# Patient Record
Sex: FEMALE | Race: BLACK OR AFRICAN AMERICAN | NOT HISPANIC OR LATINO | URBAN - METROPOLITAN AREA
[De-identification: names, ages, dates, MRNs, and addresses within clinical notes are randomized per-mention and may not be internally consistent; named-entity substitution may affect disease eponyms.]

---

## 2017-12-08 ENCOUNTER — INPATIENT HOSPITAL (OUTPATIENT)
Dept: URBAN - METROPOLITAN AREA HOSPITAL 107 | Facility: HOSPITAL | Age: 62
End: 2017-12-08

## 2017-12-08 DIAGNOSIS — R10.9 UNSPECIFIED ABDOMINAL PAIN: ICD-10-CM

## 2017-12-08 DIAGNOSIS — R19.7 DIARRHEA, UNSPECIFIED: ICD-10-CM

## 2017-12-08 DIAGNOSIS — K92.1 MELENA: ICD-10-CM

## 2017-12-08 DIAGNOSIS — R93.3 ABNORMAL FINDINGS ON DIAGNOSTIC IMAGING OF OTHER PARTS OF DI: ICD-10-CM

## 2017-12-08 PROCEDURE — 99223 1ST HOSP IP/OBS HIGH 75: CPT | Performed by: INTERNAL MEDICINE

## 2017-12-09 ENCOUNTER — INPATIENT HOSPITAL (OUTPATIENT)
Dept: URBAN - METROPOLITAN AREA HOSPITAL 107 | Facility: HOSPITAL | Age: 62
End: 2017-12-09

## 2017-12-09 DIAGNOSIS — K52.9 NONINFECTIVE GASTROENTERITIS AND COLITIS, UNSPECIFIED: ICD-10-CM

## 2017-12-09 DIAGNOSIS — K92.1 MELENA: ICD-10-CM

## 2017-12-09 DIAGNOSIS — R93.3 ABNORMAL FINDINGS ON DIAGNOSTIC IMAGING OF OTHER PARTS OF DI: ICD-10-CM

## 2017-12-09 DIAGNOSIS — R19.7 DIARRHEA, UNSPECIFIED: ICD-10-CM

## 2017-12-09 DIAGNOSIS — K63.3 ULCER OF INTESTINE: ICD-10-CM

## 2017-12-09 PROCEDURE — 45331 SIGMOIDOSCOPY AND BIOPSY: CPT | Performed by: INTERNAL MEDICINE

## 2017-12-10 PROCEDURE — 99232 SBSQ HOSP IP/OBS MODERATE 35: CPT | Performed by: INTERNAL MEDICINE

## 2017-12-11 ENCOUNTER — INPATIENT HOSPITAL (OUTPATIENT)
Dept: URBAN - METROPOLITAN AREA HOSPITAL 107 | Facility: HOSPITAL | Age: 62
End: 2017-12-11

## 2017-12-11 DIAGNOSIS — D64.89 OTHER SPECIFIED ANEMIAS: ICD-10-CM

## 2017-12-11 DIAGNOSIS — K92.1 MELENA: ICD-10-CM

## 2017-12-11 DIAGNOSIS — R10.9 UNSPECIFIED ABDOMINAL PAIN: ICD-10-CM

## 2017-12-11 DIAGNOSIS — K52.9 NONINFECTIVE GASTROENTERITIS AND COLITIS, UNSPECIFIED: ICD-10-CM

## 2017-12-11 PROCEDURE — 99231 SBSQ HOSP IP/OBS SF/LOW 25: CPT | Performed by: PHYSICIAN ASSISTANT

## 2017-12-22 ENCOUNTER — OFFICE (OUTPATIENT)
Dept: URBAN - METROPOLITAN AREA CLINIC 75 | Facility: CLINIC | Age: 62
End: 2017-12-22

## 2017-12-22 VITALS
WEIGHT: 130 LBS | SYSTOLIC BLOOD PRESSURE: 130 MMHG | DIASTOLIC BLOOD PRESSURE: 80 MMHG | HEART RATE: 68 BPM | HEIGHT: 64 IN

## 2017-12-22 DIAGNOSIS — K52.9 NONINFECTIVE GASTROENTERITIS AND COLITIS, UNSPECIFIED: ICD-10-CM

## 2017-12-22 PROCEDURE — 99214 OFFICE O/P EST MOD 30 MIN: CPT | Performed by: INTERNAL MEDICINE

## 2018-01-19 ENCOUNTER — HOSPITAL ENCOUNTER (INPATIENT)
Facility: HOSPITAL | Age: 63
LOS: 8 days | Discharge: ANOTHER HOSPITAL | End: 2018-01-27
Attending: EMERGENCY MEDICINE | Admitting: INTERNAL MEDICINE

## 2018-01-19 DIAGNOSIS — Z74.09 IMPAIRED FUNCTIONAL MOBILITY AND ACTIVITY TOLERANCE: ICD-10-CM

## 2018-01-19 DIAGNOSIS — R50.9 FUO (FEVER OF UNKNOWN ORIGIN): Primary | ICD-10-CM

## 2018-01-19 PROBLEM — E87.5 HYPERKALEMIA: Status: ACTIVE | Noted: 2018-01-19

## 2018-01-19 PROBLEM — N17.9 AKI (ACUTE KIDNEY INJURY): Status: ACTIVE | Noted: 2018-01-19

## 2018-01-19 PROBLEM — L03.113 CELLULITIS OF RIGHT UPPER ARM: Status: ACTIVE | Noted: 2018-01-05

## 2018-01-19 PROBLEM — E87.1 HYPONATREMIA: Status: ACTIVE | Noted: 2018-01-19

## 2018-01-19 LAB
ALBUMIN SERPL-MCNC: 2.6 G/DL (ref 3.5–5.2)
ALBUMIN/GLOB SERPL: 0.5 G/DL
ALP SERPL-CCNC: 543 U/L (ref 39–117)
ALT SERPL W P-5'-P-CCNC: 26 U/L (ref 1–33)
ANION GAP SERPL CALCULATED.3IONS-SCNC: 12.2 MMOL/L
ANISOCYTOSIS BLD QL: ABNORMAL
ANISOCYTOSIS BLD QL: NORMAL
AST SERPL-CCNC: 78 U/L (ref 1–32)
BILIRUB SERPL-MCNC: 0.5 MG/DL (ref 0.1–1.2)
BUN BLD-MCNC: 59 MG/DL (ref 8–23)
BUN/CREAT SERPL: 34.7 (ref 7–25)
CALCIUM SPEC-SCNC: 9 MG/DL (ref 8.6–10.5)
CHLORIDE SERPL-SCNC: 92 MMOL/L (ref 98–107)
CO2 SERPL-SCNC: 22.8 MMOL/L (ref 22–29)
CREAT BLD-MCNC: 1.7 MG/DL (ref 0.57–1)
D-LACTATE SERPL-SCNC: 2.1 MMOL/L (ref 0.5–2)
DEPRECATED RDW RBC AUTO: 49.6 FL (ref 37–54)
ERYTHROCYTE [DISTWIDTH] IN BLOOD BY AUTOMATED COUNT: 19.9 % (ref 11.7–13)
GFR SERPL CREATININE-BSD FRML MDRD: 37 ML/MIN/1.73
GLOBULIN UR ELPH-MCNC: 5.5 GM/DL
GLUCOSE BLD-MCNC: 92 MG/DL (ref 65–99)
HCT VFR BLD AUTO: 26.9 % (ref 35.6–45.5)
HGB BLD-MCNC: 8.1 G/DL (ref 11.9–15.5)
HOLD SPECIMEN: NORMAL
HYPOCHROMIA BLD QL: ABNORMAL
HYPOCHROMIA BLD QL: NORMAL
LYMPHOCYTES # BLD MANUAL: 0.97 10*3/MM3 (ref 0.9–4.8)
LYMPHOCYTES NFR BLD MANUAL: 12 % (ref 5–12)
LYMPHOCYTES NFR BLD MANUAL: 16 % (ref 19.6–45.3)
MCH RBC QN AUTO: 21 PG (ref 26.9–32)
MCHC RBC AUTO-ENTMCNC: 30.1 G/DL (ref 32.4–36.3)
MCV RBC AUTO: 69.9 FL (ref 80.5–98.2)
MICROCYTES BLD QL: ABNORMAL
MICROCYTES BLD QL: NORMAL
MONOCYTES # BLD AUTO: 0.73 10*3/MM3 (ref 0.2–1.2)
NEUTROPHILS # BLD AUTO: 4.37 10*3/MM3 (ref 1.9–8.1)
NEUTROPHILS NFR BLD MANUAL: 72 % (ref 42.7–76)
PLAT MORPH BLD: NORMAL
PLATELET # BLD AUTO: 150 10*3/MM3 (ref 140–500)
POTASSIUM BLD-SCNC: 6 MMOL/L (ref 3.5–5.2)
PROT SERPL-MCNC: 8.1 G/DL (ref 6–8.5)
RBC # BLD AUTO: 3.85 10*6/MM3 (ref 3.9–5.2)
SCAN SLIDE: NORMAL
SMUDGE CELLS BLD QL SMEAR: ABNORMAL
SMUDGE CELLS BLD QL SMEAR: NORMAL
SODIUM BLD-SCNC: 127 MMOL/L (ref 136–145)
TARGETS BLD QL SMEAR: ABNORMAL
TARGETS BLD QL SMEAR: NORMAL
WBC NRBC COR # BLD: 6.07 10*3/MM3 (ref 4.5–10.7)

## 2018-01-19 PROCEDURE — 99284 EMERGENCY DEPT VISIT MOD MDM: CPT

## 2018-01-19 PROCEDURE — 85007 BL SMEAR W/DIFF WBC COUNT: CPT | Performed by: EMERGENCY MEDICINE

## 2018-01-19 PROCEDURE — 25010000002 ENOXAPARIN PER 10 MG: Performed by: INTERNAL MEDICINE

## 2018-01-19 PROCEDURE — 25010000002 VANCOMYCIN 10 G RECONSTITUTED SOLUTION: Performed by: EMERGENCY MEDICINE

## 2018-01-19 PROCEDURE — 83605 ASSAY OF LACTIC ACID: CPT | Performed by: EMERGENCY MEDICINE

## 2018-01-19 PROCEDURE — 36415 COLL VENOUS BLD VENIPUNCTURE: CPT | Performed by: EMERGENCY MEDICINE

## 2018-01-19 PROCEDURE — 25010000002 MEROPENEM PER 100 MG: Performed by: EMERGENCY MEDICINE

## 2018-01-19 PROCEDURE — 87040 BLOOD CULTURE FOR BACTERIA: CPT | Performed by: EMERGENCY MEDICINE

## 2018-01-19 PROCEDURE — 80053 COMPREHEN METABOLIC PANEL: CPT | Performed by: EMERGENCY MEDICINE

## 2018-01-19 PROCEDURE — 85025 COMPLETE CBC W/AUTO DIFF WBC: CPT | Performed by: EMERGENCY MEDICINE

## 2018-01-19 RX ORDER — GABAPENTIN 100 MG/1
200 CAPSULE ORAL EVERY 12 HOURS SCHEDULED
Status: DISCONTINUED | OUTPATIENT
Start: 2018-01-19 | End: 2018-01-25

## 2018-01-19 RX ORDER — HYDROCODONE BITARTRATE AND ACETAMINOPHEN 5; 325 MG/1; MG/1
1 TABLET ORAL EVERY 6 HOURS PRN
Status: DISCONTINUED | OUTPATIENT
Start: 2018-01-19 | End: 2018-01-27 | Stop reason: HOSPADM

## 2018-01-19 RX ORDER — ACETAMINOPHEN 325 MG/1
650 TABLET ORAL EVERY 6 HOURS PRN
Status: DISCONTINUED | OUTPATIENT
Start: 2018-01-19 | End: 2018-01-27 | Stop reason: HOSPADM

## 2018-01-19 RX ORDER — ESTRADIOL 1 MG/1
1 TABLET ORAL DAILY
COMMUNITY
End: 2018-01-27 | Stop reason: HOSPADM

## 2018-01-19 RX ORDER — FLUCONAZOLE 200 MG/1
400 TABLET ORAL DAILY
COMMUNITY
End: 2018-01-27 | Stop reason: HOSPADM

## 2018-01-19 RX ORDER — IRON ASPGLY,PS/C/B12/FA/CA/SUC 150-25-1
1 CAPSULE ORAL
Status: DISCONTINUED | OUTPATIENT
Start: 2018-01-20 | End: 2018-01-26

## 2018-01-19 RX ORDER — GABAPENTIN 100 MG/1
200 CAPSULE ORAL 3 TIMES DAILY
COMMUNITY
End: 2018-01-27 | Stop reason: HOSPADM

## 2018-01-19 RX ORDER — CYCLOSPORINE 0.5 MG/ML
1 EMULSION OPHTHALMIC 2 TIMES DAILY
Status: DISCONTINUED | OUTPATIENT
Start: 2018-01-19 | End: 2018-01-27 | Stop reason: HOSPADM

## 2018-01-19 RX ORDER — DEXTROSE MONOHYDRATE 25 G/50ML
50 INJECTION, SOLUTION INTRAVENOUS
Status: DISCONTINUED | OUTPATIENT
Start: 2018-01-19 | End: 2018-01-27 | Stop reason: HOSPADM

## 2018-01-19 RX ORDER — ATENOLOL AND CHLORTHALIDONE TABLET 50; 25 MG/1; MG/1
1 TABLET ORAL DAILY
COMMUNITY
End: 2018-01-27 | Stop reason: HOSPADM

## 2018-01-19 RX ORDER — FERROUS GLUCONATE 324(37.5)
324 TABLET ORAL 2 TIMES DAILY WITH MEALS
COMMUNITY
End: 2018-01-27 | Stop reason: HOSPADM

## 2018-01-19 RX ORDER — TEMAZEPAM 15 MG/1
15 CAPSULE ORAL NIGHTLY PRN
Status: DISCONTINUED | OUTPATIENT
Start: 2018-01-19 | End: 2018-01-27 | Stop reason: HOSPADM

## 2018-01-19 RX ORDER — TEMAZEPAM 15 MG/1
15 CAPSULE ORAL NIGHTLY PRN
COMMUNITY
End: 2018-01-27 | Stop reason: HOSPADM

## 2018-01-19 RX ORDER — AMLODIPINE AND VALSARTAN 5; 320 MG/1; MG/1
1 TABLET ORAL DAILY
COMMUNITY
End: 2018-01-27 | Stop reason: HOSPADM

## 2018-01-19 RX ORDER — HYDROCODONE BITARTRATE AND ACETAMINOPHEN 5; 325 MG/1; MG/1
1.5 TABLET ORAL EVERY 6 HOURS PRN
COMMUNITY
End: 2018-01-27 | Stop reason: HOSPADM

## 2018-01-19 RX ORDER — SODIUM POLYSTYRENE SULFONATE 15 G/60ML
30 SUSPENSION ORAL; RECTAL ONCE
Status: COMPLETED | OUTPATIENT
Start: 2018-01-19 | End: 2018-01-20

## 2018-01-19 RX ORDER — CYCLOSPORINE 0.5 MG/ML
1 EMULSION OPHTHALMIC 2 TIMES DAILY
COMMUNITY

## 2018-01-19 RX ORDER — FERROUS SULFATE 325(65) MG
325 TABLET ORAL 2 TIMES DAILY WITH MEALS
Status: DISCONTINUED | OUTPATIENT
Start: 2018-01-19 | End: 2018-01-26

## 2018-01-19 RX ORDER — ESTRADIOL 1 MG/1
1 TABLET ORAL DAILY
Status: DISCONTINUED | OUTPATIENT
Start: 2018-01-20 | End: 2018-01-26

## 2018-01-19 RX ORDER — ERGOCALCIFEROL 1.25 MG/1
50000 CAPSULE ORAL WEEKLY
COMMUNITY
End: 2018-01-27 | Stop reason: HOSPADM

## 2018-01-19 RX ORDER — PREDNISONE 10 MG/1
5 TABLET ORAL DAILY
COMMUNITY
End: 2018-01-27 | Stop reason: HOSPADM

## 2018-01-19 RX ADMIN — ENOXAPARIN SODIUM 30 MG: 30 INJECTION SUBCUTANEOUS at 22:45

## 2018-01-19 RX ADMIN — SODIUM CHLORIDE 500 ML: 9 INJECTION, SOLUTION INTRAVENOUS at 22:01

## 2018-01-19 RX ADMIN — MEROPENEM 1 G: 1 INJECTION, POWDER, FOR SOLUTION INTRAVENOUS at 21:12

## 2018-01-19 RX ADMIN — VANCOMYCIN HYDROCHLORIDE 1250 MG: 100 INJECTION, POWDER, LYOPHILIZED, FOR SOLUTION INTRAVENOUS at 22:03

## 2018-01-19 RX ADMIN — SODIUM CHLORIDE 500 ML: 9 INJECTION, SOLUTION INTRAVENOUS at 19:56

## 2018-01-19 RX ADMIN — TEMAZEPAM 15 MG: 15 CAPSULE ORAL at 22:45

## 2018-01-19 RX ADMIN — HYDROCODONE BITARTRATE AND ACETAMINOPHEN 1 TABLET: 5; 325 TABLET ORAL at 22:45

## 2018-01-20 ENCOUNTER — APPOINTMENT (OUTPATIENT)
Dept: ULTRASOUND IMAGING | Facility: HOSPITAL | Age: 63
End: 2018-01-20

## 2018-01-20 ENCOUNTER — APPOINTMENT (OUTPATIENT)
Dept: GENERAL RADIOLOGY | Facility: HOSPITAL | Age: 63
End: 2018-01-20

## 2018-01-20 ENCOUNTER — APPOINTMENT (OUTPATIENT)
Dept: CT IMAGING | Facility: HOSPITAL | Age: 63
End: 2018-01-20

## 2018-01-20 PROBLEM — D64.9 ANEMIA: Status: ACTIVE | Noted: 2018-01-20

## 2018-01-20 LAB
ALBUMIN SERPL-MCNC: 2 G/DL (ref 3.5–5.2)
ANION GAP SERPL CALCULATED.3IONS-SCNC: 12.4 MMOL/L
ANISOCYTOSIS BLD QL: NORMAL
B PERT DNA SPEC QL NAA+PROBE: NOT DETECTED
BASOPHILS # BLD AUTO: 0.01 10*3/MM3 (ref 0–0.2)
BASOPHILS NFR BLD AUTO: 0.2 % (ref 0–1.5)
BILIRUB UR QL STRIP: NEGATIVE
BUN BLD-MCNC: 48 MG/DL (ref 8–23)
BUN/CREAT SERPL: 38.7 (ref 7–25)
C PNEUM DNA NPH QL NAA+NON-PROBE: NOT DETECTED
C3 FRG RBC-MCNC: NORMAL
CALCIUM SPEC-SCNC: 8 MG/DL (ref 8.6–10.5)
CHLORIDE SERPL-SCNC: 101 MMOL/L (ref 98–107)
CLARITY UR: CLEAR
CO2 SERPL-SCNC: 21.6 MMOL/L (ref 22–29)
COLOR UR: YELLOW
CORTIS SERPL-MCNC: 7.97 MCG/DL
CREAT BLD-MCNC: 1.24 MG/DL (ref 0.57–1)
CREAT UR-MCNC: 40.1 MG/DL
CRP SERPL-MCNC: 10.72 MG/DL (ref 0–0.5)
D-LACTATE SERPL-SCNC: 1.7 MMOL/L (ref 0.5–2)
DEPRECATED RDW RBC AUTO: 49.3 FL (ref 37–54)
EOSINOPHIL # BLD AUTO: 0.02 10*3/MM3 (ref 0–0.7)
EOSINOPHIL NFR BLD AUTO: 0.4 % (ref 0.3–6.2)
ERYTHROCYTE [DISTWIDTH] IN BLOOD BY AUTOMATED COUNT: 19.9 % (ref 11.7–13)
FLUAV H1 2009 PAND RNA NPH QL NAA+PROBE: NOT DETECTED
FLUAV H1 HA GENE NPH QL NAA+PROBE: NOT DETECTED
FLUAV H3 RNA NPH QL NAA+PROBE: NOT DETECTED
FLUAV SUBTYP SPEC NAA+PROBE: NOT DETECTED
FLUBV RNA ISLT QL NAA+PROBE: NOT DETECTED
GFR SERPL CREATININE-BSD FRML MDRD: 53 ML/MIN/1.73
GLUCOSE BLD-MCNC: 69 MG/DL (ref 65–99)
GLUCOSE UR STRIP-MCNC: NEGATIVE MG/DL
HADV DNA SPEC NAA+PROBE: NOT DETECTED
HCOV 229E RNA SPEC QL NAA+PROBE: NOT DETECTED
HCOV HKU1 RNA SPEC QL NAA+PROBE: NOT DETECTED
HCOV NL63 RNA SPEC QL NAA+PROBE: NOT DETECTED
HCOV OC43 RNA SPEC QL NAA+PROBE: NOT DETECTED
HCT VFR BLD AUTO: 22.9 % (ref 35.6–45.5)
HCT VFR BLD AUTO: 24 % (ref 35.6–45.5)
HGB BLD-MCNC: 7 G/DL (ref 11.9–15.5)
HGB BLD-MCNC: 7.1 G/DL (ref 11.9–15.5)
HGB UR QL STRIP.AUTO: ABNORMAL
HMPV RNA NPH QL NAA+NON-PROBE: NOT DETECTED
HPIV1 RNA SPEC QL NAA+PROBE: NOT DETECTED
HPIV2 RNA SPEC QL NAA+PROBE: NOT DETECTED
HPIV3 RNA NPH QL NAA+PROBE: NOT DETECTED
HPIV4 P GENE NPH QL NAA+PROBE: NOT DETECTED
HYPOCHROMIA BLD QL: NORMAL
IMM GRANULOCYTES # BLD: 0.04 10*3/MM3 (ref 0–0.03)
IMM GRANULOCYTES NFR BLD: 0.8 % (ref 0–0.5)
KETONES UR QL STRIP: NEGATIVE
LEUKOCYTE ESTERASE UR QL STRIP.AUTO: NEGATIVE
LYMPHOCYTES # BLD AUTO: 2.07 10*3/MM3 (ref 0.9–4.8)
LYMPHOCYTES NFR BLD AUTO: 42.4 % (ref 19.6–45.3)
M PNEUMO IGG SER IA-ACNC: NOT DETECTED
MACROCYTES BLD QL SMEAR: NORMAL
MCH RBC QN AUTO: 21.1 PG (ref 26.9–32)
MCHC RBC AUTO-ENTMCNC: 30.6 G/DL (ref 32.4–36.3)
MCV RBC AUTO: 69.2 FL (ref 80.5–98.2)
MONOCYTES # BLD AUTO: 0.28 10*3/MM3 (ref 0.2–1.2)
MONOCYTES NFR BLD AUTO: 5.7 % (ref 5–12)
NEUTROPHILS # BLD AUTO: 2.46 10*3/MM3 (ref 1.9–8.1)
NEUTROPHILS NFR BLD AUTO: 50.5 % (ref 42.7–76)
NITRITE UR QL STRIP: NEGATIVE
PH UR STRIP.AUTO: 6.5 [PH] (ref 5–8)
PHOSPHATE SERPL-MCNC: 4.3 MG/DL (ref 2.5–4.5)
PLAT MORPH BLD: NORMAL
PLATELET # BLD AUTO: 126 10*3/MM3 (ref 140–500)
POTASSIUM BLD-SCNC: 4.7 MMOL/L (ref 3.5–5.2)
PROCALCITONIN SERPL-MCNC: 1.01 NG/ML (ref 0.1–0.25)
PROT UR QL STRIP: ABNORMAL
RBC # BLD AUTO: 3.31 10*6/MM3 (ref 3.9–5.2)
RHINOVIRUS RNA SPEC NAA+PROBE: NOT DETECTED
RSV RNA NPH QL NAA+NON-PROBE: NOT DETECTED
SODIUM BLD-SCNC: 135 MMOL/L (ref 136–145)
SODIUM UR-SCNC: 75 MMOL/L
SP GR UR STRIP: 1.01 (ref 1–1.03)
TARGETS BLD QL SMEAR: NORMAL
UROBILINOGEN UR QL STRIP: ABNORMAL
WBC MORPH BLD: NORMAL
WBC NRBC COR # BLD: 4.88 10*3/MM3 (ref 4.5–10.7)

## 2018-01-20 PROCEDURE — 85014 HEMATOCRIT: CPT | Performed by: INTERNAL MEDICINE

## 2018-01-20 PROCEDURE — 85007 BL SMEAR W/DIFF WBC COUNT: CPT | Performed by: INTERNAL MEDICINE

## 2018-01-20 PROCEDURE — 63710000001 INSULIN REGULAR HUMAN PER 5 UNITS: Performed by: INTERNAL MEDICINE

## 2018-01-20 PROCEDURE — 84145 PROCALCITONIN (PCT): CPT | Performed by: INTERNAL MEDICINE

## 2018-01-20 PROCEDURE — 0 IOPAMIDOL 61 % SOLUTION: Performed by: HOSPITALIST

## 2018-01-20 PROCEDURE — 86140 C-REACTIVE PROTEIN: CPT | Performed by: INTERNAL MEDICINE

## 2018-01-20 PROCEDURE — 81003 URINALYSIS AUTO W/O SCOPE: CPT | Performed by: INTERNAL MEDICINE

## 2018-01-20 PROCEDURE — 87798 DETECT AGENT NOS DNA AMP: CPT | Performed by: INTERNAL MEDICINE

## 2018-01-20 PROCEDURE — 85025 COMPLETE CBC W/AUTO DIFF WBC: CPT | Performed by: INTERNAL MEDICINE

## 2018-01-20 PROCEDURE — 99223 1ST HOSP IP/OBS HIGH 75: CPT | Performed by: INTERNAL MEDICINE

## 2018-01-20 PROCEDURE — 80069 RENAL FUNCTION PANEL: CPT | Performed by: INTERNAL MEDICINE

## 2018-01-20 PROCEDURE — 25010000002 MEROPENEM PER 100 MG: Performed by: INTERNAL MEDICINE

## 2018-01-20 PROCEDURE — 25010000002 ENOXAPARIN PER 10 MG: Performed by: INTERNAL MEDICINE

## 2018-01-20 PROCEDURE — 82570 ASSAY OF URINE CREATININE: CPT | Performed by: INTERNAL MEDICINE

## 2018-01-20 PROCEDURE — 76775 US EXAM ABDO BACK WALL LIM: CPT

## 2018-01-20 PROCEDURE — 73600 X-RAY EXAM OF ANKLE: CPT

## 2018-01-20 PROCEDURE — 87633 RESP VIRUS 12-25 TARGETS: CPT | Performed by: INTERNAL MEDICINE

## 2018-01-20 PROCEDURE — 84300 ASSAY OF URINE SODIUM: CPT | Performed by: INTERNAL MEDICINE

## 2018-01-20 PROCEDURE — 87581 M.PNEUMON DNA AMP PROBE: CPT | Performed by: INTERNAL MEDICINE

## 2018-01-20 PROCEDURE — 71260 CT THORAX DX C+: CPT

## 2018-01-20 PROCEDURE — 85018 HEMOGLOBIN: CPT | Performed by: INTERNAL MEDICINE

## 2018-01-20 PROCEDURE — 74177 CT ABD & PELVIS W/CONTRAST: CPT

## 2018-01-20 PROCEDURE — 87486 CHLMYD PNEUM DNA AMP PROBE: CPT | Performed by: INTERNAL MEDICINE

## 2018-01-20 PROCEDURE — 82533 TOTAL CORTISOL: CPT | Performed by: INTERNAL MEDICINE

## 2018-01-20 PROCEDURE — 87385 HISTOPLASMA CAPSUL AG IA: CPT | Performed by: INTERNAL MEDICINE

## 2018-01-20 PROCEDURE — 83605 ASSAY OF LACTIC ACID: CPT | Performed by: EMERGENCY MEDICINE

## 2018-01-20 RX ADMIN — ENOXAPARIN SODIUM 30 MG: 30 INJECTION SUBCUTANEOUS at 21:30

## 2018-01-20 RX ADMIN — SODIUM BICARBONATE: 84 INJECTION, SOLUTION INTRAVENOUS at 00:59

## 2018-01-20 RX ADMIN — DEXTROSE MONOHYDRATE 50 ML: 25 INJECTION, SOLUTION INTRAVENOUS at 00:03

## 2018-01-20 RX ADMIN — SODIUM BICARBONATE: 84 INJECTION, SOLUTION INTRAVENOUS at 21:17

## 2018-01-20 RX ADMIN — HYDROCODONE BITARTRATE AND ACETAMINOPHEN 1 TABLET: 5; 325 TABLET ORAL at 21:31

## 2018-01-20 RX ADMIN — CYCLOSPORINE 1 DROP: 0.5 EMULSION OPHTHALMIC at 21:30

## 2018-01-20 RX ADMIN — SODIUM POLYSTYRENE SULFONATE 30 G: 15 SUSPENSION ORAL; RECTAL at 00:59

## 2018-01-20 RX ADMIN — Medication 1 CAPSULE: at 10:13

## 2018-01-20 RX ADMIN — IOPAMIDOL 85 ML: 612 INJECTION, SOLUTION INTRAVENOUS at 15:31

## 2018-01-20 RX ADMIN — HYDROCODONE BITARTRATE AND ACETAMINOPHEN 1 TABLET: 5; 325 TABLET ORAL at 16:25

## 2018-01-20 RX ADMIN — ACETAMINOPHEN 650 MG: 325 TABLET ORAL at 11:21

## 2018-01-20 RX ADMIN — ESTRADIOL 1 MG: 1 TABLET ORAL at 10:24

## 2018-01-20 RX ADMIN — TEMAZEPAM 15 MG: 15 CAPSULE ORAL at 21:31

## 2018-01-20 RX ADMIN — HUMAN INSULIN 10 UNITS: 100 INJECTION, SOLUTION SUBCUTANEOUS at 00:03

## 2018-01-20 RX ADMIN — FERROUS SULFATE TAB 325 MG (65 MG ELEMENTAL FE) 325 MG: 325 (65 FE) TAB at 10:01

## 2018-01-20 RX ADMIN — HYDROCODONE BITARTRATE AND ACETAMINOPHEN 1 TABLET: 5; 325 TABLET ORAL at 10:01

## 2018-01-20 RX ADMIN — CYCLOSPORINE 1 DROP: 0.5 EMULSION OPHTHALMIC at 10:01

## 2018-01-20 RX ADMIN — SODIUM BICARBONATE: 84 INJECTION, SOLUTION INTRAVENOUS at 10:00

## 2018-01-20 RX ADMIN — CYCLOSPORINE 1 DROP: 0.5 EMULSION OPHTHALMIC at 00:03

## 2018-01-20 NOTE — ED PROVIDER NOTES
EMERGENCY DEPARTMENT ENCOUNTER    CHIEF COMPLAINT  Chief Complaint: BLE edema  History given by: Pt  History limited by: Nothing  Room Number: /  PMD: Javier Soriano III, MD      HPI:  Pt is a 62 y.o. female who presents complaining of difficulty walking secondary to BLE edema. Pt also c/o fever (TMax = 101.2, this morning) and fatigue. Pt reports she has been seen at [facility] multiple times for her sx. During her admissions she was told she had an ischemic colitis and well as liver nodules. She that in late December she began experiencing BLE edema. She states she saw her rheumatologist for her BLE edema who was concerned for cellulitis, and was given a prednisone dosepak and told to go to the ER if she ran a fever.    Duration/Onset/Timing: several weeks  Location: BLE  Radiation: none  Quality: swelling  Intensity/Severity: moderate  Associated Symptoms: fever (TMax = 101.2, this morning), fatigue  Aggravating or Alleviating Factors: none  Previous Episodes: Pt reports she was seen by her rheumatologist who was concerned for cellulitis      PAST MEDICAL HISTORY  Active Ambulatory Problems     Diagnosis Date Noted   • No Active Ambulatory Problems     Resolved Ambulatory Problems     Diagnosis Date Noted   • No Resolved Ambulatory Problems     Past Medical History:   Diagnosis Date   • Hypertension    • Ischemic colitis    • Rheumatoid arthritis    • Thyroid nodule        PAST SURGICAL HISTORY  Past Surgical History:   Procedure Laterality Date   • ANKLE SURGERY Right    •  SECTION     • HYSTERECTOMY     • JOINT REPLACEMENT Bilateral     knee       FAMILY HISTORY  History reviewed. No pertinent family history.    SOCIAL HISTORY  Social History     Social History   • Marital status:      Spouse name: N/A   • Number of children: N/A   • Years of education: N/A     Occupational History   • Not on file.     Social History Main Topics   • Smoking status: Never Smoker   • Smokeless tobacco: Not on  file   • Alcohol use No   • Drug use: Defer   • Sexual activity: Defer     Other Topics Concern   • Not on file     Social History Narrative   • No narrative on file       ALLERGIES  Keflex [cephalexin] and Latex    REVIEW OF SYSTEMS  Review of Systems   Constitutional: Positive for fatigue and fever (101.2, this morning).   HENT: Negative for sore throat.    Eyes: Negative.    Respiratory: Negative for cough and shortness of breath.    Cardiovascular: Positive for leg swelling. Negative for chest pain.   Gastrointestinal: Negative for abdominal pain, diarrhea and vomiting.   Genitourinary: Negative for dysuria.   Musculoskeletal: Negative for neck pain.   Skin: Negative for rash.   Allergic/Immunologic: Negative.    Neurological: Negative for weakness, numbness and headaches.   Hematological: Negative.    Psychiatric/Behavioral: Negative.    All other systems reviewed and are negative.      PHYSICAL EXAM  ED Triage Vitals   Temp Heart Rate Resp BP SpO2   01/19/18 1351 01/19/18 1351 01/19/18 1351 01/19/18 1415 01/19/18 1351   100.4 °F (38 °C) 95 18 166/116 96 %      Temp src Heart Rate Source Patient Position BP Location FiO2 (%)   01/19/18 1552 01/19/18 1552 01/19/18 1552 01/19/18 1552 --   Oral Monitor Sitting Left arm        Physical Exam   Constitutional: She is oriented to person, place, and time and well-developed, well-nourished, and in no distress. No distress.   HENT:   Head: Normocephalic and atraumatic.   Eyes: EOM are normal. Pupils are equal, round, and reactive to light.   Neck: Normal range of motion. Neck supple.   Cardiovascular: Regular rhythm and normal heart sounds.  Tachycardia present.    Pulmonary/Chest: Effort normal and breath sounds normal. No respiratory distress.   Abdominal: Soft. There is no tenderness. There is no rebound and no guarding.   Musculoskeletal: Normal range of motion. She exhibits edema (bilateral pedal edema) and tenderness (BLE).   Neurological: She is alert and oriented  to person, place, and time. She has normal sensation and normal strength.   Skin: Skin is warm and dry. No rash noted.   There is an area of erythema, warmth, and tenderness on the L lower leg with blistering. There is a smaller area on the R lower leg that is erythematous, warm, and tender without blister.    Psychiatric: Mood and affect normal.   Nursing note and vitals reviewed.      LAB RESULTS  Lab Results (last 24 hours)     Procedure Component Value Units Date/Time    Comprehensive Metabolic Panel [[id number]]  (Abnormal) Collected:  01/19/18 1457    Specimen:  Blood Updated:  01/19/18 1715     Glucose 92 mg/dL      BUN 59 (H) mg/dL      Creatinine 1.70 (H) mg/dL      Sodium 127 (L) mmol/L      Potassium 6.0 (H) mmol/L      Chloride 92 (L) mmol/L      CO2 22.8 mmol/L      Calcium 9.0 mg/dL      Total Protein 8.1 g/dL      Albumin 2.60 (L) g/dL      ALT (SGPT) 26 U/L      AST (SGOT) 78 (H) U/L      Alkaline Phosphatase 543 (H) U/L      Total Bilirubin 0.5 mg/dL      eGFR  African Amer 37 (L) mL/min/1.73      Globulin 5.5 gm/dL      A/G Ratio 0.5 g/dL      BUN/Creatinine Ratio 34.7 (H)     Anion Gap 12.2 mmol/L     CBC & Differential [[id number]] Collected:  01/19/18 1457    Specimen:  Blood Updated:  01/19/18 6379    Narrative:       The following orders were created for panel order CBC & Differential.  Procedure                               Abnormality         Status                     ---------                               -----------         ------                     Manual Differential[[id number]]          Abnormal            Final result               Scan Slide[[id number]]                                       Final result               CBC Auto Differential[[id number]]        Abnormal            Final result                 Please view results for these tests on the individual orders.    CBC Auto Differential [[id number]]  (Abnormal) Collected:  01/19/18 1457    Specimen:  Blood Updated:  01/19/18 7048      WBC 6.07 10*3/mm3      RBC 3.85 (L) 10*6/mm3      Hemoglobin 8.1 (L) g/dL      Hematocrit 26.9 (L) %      MCV 69.9 (L) fL      MCH 21.0 (L) pg      MCHC 30.1 (L) g/dL      RDW 19.9 (H) %      RDW-SD 49.6 fl      Platelets 150 10*3/mm3     Scan Slide [[id number]] Collected:  01/19/18 1457    Specimen:  Blood Updated:  01/19/18 1556     Anisocytosis Mod/2+     Hypochromia Slight/1+     Microcytes Slight/1+     Target Cells Slight/1+     Smudge Cells Slight/1+     Scan Slide --      See Manual Differential Results       Manual Differential [[id number]]  (Abnormal) Collected:  01/19/18 1457    Specimen:  Blood Updated:  01/19/18 1556     Neutrophil % 72.0 %      Lymphocyte % 16.0 (L) %      Monocyte % 12.0 %      Neutrophils Absolute 4.37 10*3/mm3      Lymphocytes Absolute 0.97 10*3/mm3      Monocytes Absolute 0.73 10*3/mm3      Anisocytosis Mod/2+     Hypochromia Slight/1+     Microcytes Slight/1+     Target Cells Slight/1+     Smudge Cells Slight/1+     Platelet Morphology Normal    Lactic Acid, Plasma [[id number]]  (Abnormal) Collected:  01/19/18 1934    Specimen:  Blood Updated:  01/19/18 2028     Lactate 2.1 (C) mmol/L     Blood Culture - Blood, Blood, Venous Line [[id number]] Collected:  01/19/18 1934    Specimen:  Blood from Blood, Venous Line Updated:  01/19/18 1949    Lactic Acid, Reflex Timer (This will reflex a repeat order 3-3:15 hours after ordered.) [[id number]] Collected:  01/19/18 1934    Specimen:  Blood Updated:  01/19/18 2028          I ordered the above labs and reviewed the results    PROCEDURES  Procedures      PROGRESS AND CONSULTS  ED Course     1908 - Lab work ordered for further evaluation. IVF ordered.     1947 - Consult placed with A.     2035 - Consulted with Dr. Elam ([facility]) who agrees to admit the pt.     2036 - Vancomycin and meropenem ordered.       MEDICAL DECISION MAKING  Results were reviewed/discussed with the patient and they were also made aware of online access. Pt also made  aware that some labs, such as cultures, will not be resulted during ER visit and follow up with PMD is necessary.     MDM  Number of Diagnoses or Management Options     Amount and/or Complexity of Data Reviewed  Clinical lab tests: ordered and reviewed (Hemoglobin - 8.1  Creatinine - 1.70  BUN - 59  Lactate - 2.1)  Discuss the patient with other providers: yes (Dr. Elam ([facility]))         DIAGNOSIS  Final diagnoses:   FUO (fever of unknown origin)       DISPOSITION  ADMISSION    Discussed treatment plan and reason for admission with pt/family and admitting physician.  Pt/family voiced understanding of the plan for admission for further testing/treatment as needed.         Latest Documented Vital Signs:  As of 9:00 PM  BP- 135/95 HR- 91 Temp- 98 °F (36.7 °C) (Oral) O2 sat- 100%    --  Documentation assistance provided by sharad Owens for Dr. Panda.  Information recorded by the scribe was done at my direction and has been verified and validated by me.     Sanya Owens  01/19/18 2043       Sanya Owens  01/19/18 2100       Diego Panda MD  01/19/18 3668

## 2018-01-20 NOTE — PLAN OF CARE
"Problem: Patient Care Overview (Adult)  Goal: Plan of Care Review  Outcome: Ongoing (interventions implemented as appropriate)   01/20/18 1647   Coping/Psychosocial Response Interventions   Plan Of Care Reviewed With patient   Patient Care Overview   Progress no change   Outcome Evaluation   Outcome Summary/Follow up Plan Temp 99.9 today at ~16:30, other VSS. Still have a \"10\" pain level in stephanie LE. Bicarb gtt still @ 100 per order. Renal US (-), clean catch urine (-), RVP (-). Procalcitonin 1.01, CRXR Protein 10.71, called to Dr. Andrews who is watching the labs he has ordered today. H&H from noon not yet collected. Called lab around 12:30 to come up and draw, never showed. Pt off floor for at least 2 hours after 2pm. Pt back around 16:15; called lab, who said they will get a phlebotomist up here to draw it. CTM.       Problem: Infection, Risk/Actual (Adult)  Goal: Identify Related Risk Factors and Signs and Symptoms  Outcome: Ongoing (interventions implemented as appropriate)   01/20/18 1647   Infection, Risk/Actual   Infection, Risk/Actual: Related Risk Factors age extremes;chronic illness/condition;medication effects   Signs and Symptoms (Infection, Risk/Actual) feeding/eating intolerance;heart rate increase;body temperature changes     Goal: Infection Prevention/Resolution  Outcome: Ongoing (interventions implemented as appropriate)   01/20/18 1647   Infection, Risk/Actual (Adult)   Infection Prevention/Resolution making progress toward outcome       Problem: Fall Risk (Adult)  Goal: Absence of Falls  Outcome: Ongoing (interventions implemented as appropriate)   01/20/18 1647   Fall Risk (Adult)   Absence of Falls making progress toward outcome       Problem: Pain, Acute (Adult)  Goal: Acceptable Pain Control/Comfort Level  Outcome: Ongoing (interventions implemented as appropriate)   01/20/18 1647   Pain, Acute (Adult)   Acceptable Pain Control/Comfort Level making progress toward outcome       Problem: " Pressure Ulcer Risk (Checo Scale) (Adult,Obstetrics,Pediatric)  Goal: Identify Related Risk Factors and Signs and Symptoms  Outcome: Ongoing (interventions implemented as appropriate)   01/20/18 1647   Pressure Ulcer Risk (Checo Scale)   Related Risk Factors (Pressure Ulcer Risk (Checo Scale)) age extremes     Goal: Skin Integrity  Outcome: Ongoing (interventions implemented as appropriate)   01/20/18 1647   Pressure Ulcer Risk (Checo Scale) (Adult,Obstetrics,Pediatric)   Skin Integrity making progress toward outcome

## 2018-01-20 NOTE — NURSING NOTE
Call to Dr. Gonzales re: Hgb 7.0 this am down from 8.1 yesterday. Recheck H&H @ noon today and inform the dayshift physician of results. CTM.

## 2018-01-20 NOTE — H&P
Name: Domitila Valera ADMIT: 2018   : 1955  PCP: Javier Soriano III, MD    MRN: [id number] LOS: 0 days   AGE/SEX: 62 y.o. female  ROOM:      Chief Complaint   Patient presents with   • Fever     last tyl 1145   • Foot Pain     r/t arthritis       Subjective  History of Present Illness  Ms. Valera is a 62 y.o. female has a history of Rheumatoid arthritis will normally sees Dr. Bro Lewis  was on immunosuppressive therapy with Xeljanz, recently stopped after infection.  She has been to [facility] multiple times and around [place] she tells me that she was diagnosed with ischemic colitis and some liver nodules.  The MRI showed these nodules could be hemangiomas.  She did had bloody stools had developed anemia.  Now those symptoms have resolved.  Today she presented with a history of having fever fatigue and pain in the right ankle.  She denies any nausea vomiting cough sputum production.  She did had fever up to 101.2 this morning.  She was not happy at [facility] and since she presented to [facility] ER.  In view of her labs shows acute renal insufficiency along with hyponatremia and hyperkalemia      History of Present Illness    Past Medical History:   Diagnosis Date   • Hypertension    • Ischemic colitis    • Rheumatoid arthritis    • Thyroid nodule      Past Surgical History:   Procedure Laterality Date   • ANKLE SURGERY Right    •  SECTION     • HYSTERECTOMY     • JOINT REPLACEMENT Bilateral     knee     History reviewed. No pertinent family history.  Social History   Substance Use Topics   • Smoking status: Never Smoker   • Smokeless tobacco: None   • Alcohol use No       (Not in a hospital admission)  Allergies:    Allergies   Allergen Reactions   • Keflex [Cephalexin]    • Latex        Review of Systems   Constitutional: Positive for fatigue and fever. Negative for chills and diaphoresis.   HENT: Negative for congestion, drooling, ear pain, mouth sores and voice change.     Eyes: Negative for discharge and itching.   Respiratory: Negative for apnea, cough, shortness of breath and wheezing.    Cardiovascular: Negative for chest pain and leg swelling.   Gastrointestinal: Negative for abdominal distention, abdominal pain, constipation, nausea and vomiting.   Endocrine: Negative for cold intolerance and heat intolerance.   Genitourinary: Negative for difficulty urinating, dysuria and frequency.   Musculoskeletal: Positive for joint swelling (right ankle). Negative for back pain, neck pain and neck stiffness.   Skin: Negative for rash.   Neurological: Negative for dizziness, seizures, syncope, weakness, numbness and headaches.   Hematological: Negative for adenopathy. Does not bruise/bleed easily.   Psychiatric/Behavioral: Negative for agitation, confusion and suicidal ideas.        Objective    Vital Signs  Temp:  [98 °F (36.7 °C)-100.4 °F (38 °C)] 98 °F (36.7 °C)  Heart Rate:  [] 97  Resp:  [16-18] 18  BP: (135-166)/() 139/96  SpO2:  [93 %-100 %] 99 %  on   ;   O2 Device: room air  Body mass index is 25.65 kg/(m^2).    Physical Exam   Constitutional: She appears well-developed.   HENT:   Head: Normocephalic and atraumatic.   Eyes: EOM are normal. Pupils are equal, round, and reactive to light.   Neck: Neck supple. No JVD present.   Cardiovascular: Normal rate and regular rhythm.    No murmur heard.  Pulmonary/Chest: Effort normal and breath sounds normal. No respiratory distress. She has no wheezes.   Abdominal: Soft. She exhibits no distension and no mass.   Musculoskeletal: She exhibits no edema.   There is an area of erythema, warmth, and tenderness on the L lower leg with blistering. There is a smaller area on the R lower leg that is erythematous, warm, and tender without blister.   Skin: Skin is warm and dry. There is erythema.   Psychiatric: She has a normal mood and affect. Her behavior is normal. Judgment and thought content normal.       Results Review:   I reviewed  the patient's new clinical results.    Lab Results (last 24 hours)     Procedure Component Value Units Date/Time    Comprehensive Metabolic Panel [[id number]]  (Abnormal) Collected:  01/19/18 1457    Specimen:  Blood Updated:  01/19/18 1545     Glucose 92 mg/dL      BUN 59 (H) mg/dL      Creatinine 1.70 (H) mg/dL      Sodium 127 (L) mmol/L      Potassium 6.0 (H) mmol/L      Chloride 92 (L) mmol/L      CO2 22.8 mmol/L      Calcium 9.0 mg/dL      Total Protein 8.1 g/dL      Albumin 2.60 (L) g/dL      ALT (SGPT) 26 U/L      AST (SGOT) 78 (H) U/L      Alkaline Phosphatase 543 (H) U/L      Total Bilirubin 0.5 mg/dL      eGFR  African Amer 37 (L) mL/min/1.73      Globulin 5.5 gm/dL      A/G Ratio 0.5 g/dL      BUN/Creatinine Ratio 34.7 (H)     Anion Gap 12.2 mmol/L     CBC & Differential [[id number]] Collected:  01/19/18 1457    Specimen:  Blood Updated:  01/19/18 1556    Narrative:       The following orders were created for panel order CBC & Differential.  Procedure                               Abnormality         Status                     ---------                               -----------         ------                     Manual Differential[[id number]]          Abnormal            Final result               Scan Slide[[id number]]                                       Final result               CBC Auto Differential[[id number]]        Abnormal            Final result                 Please view results for these tests on the individual orders.    CBC Auto Differential [[id number]]  (Abnormal) Collected:  01/19/18 1457    Specimen:  Blood Updated:  01/19/18 1556     WBC 6.07 10*3/mm3      RBC 3.85 (L) 10*6/mm3      Hemoglobin 8.1 (L) g/dL      Hematocrit 26.9 (L) %      MCV 69.9 (L) fL      MCH 21.0 (L) pg      MCHC 30.1 (L) g/dL      RDW 19.9 (H) %      RDW-SD 49.6 fl      Platelets 150 10*3/mm3     Scan Slide [[id number]] Collected:  01/19/18 1457    Specimen:  Blood Updated:  01/19/18 1556     Anisocytosis Mod/2+      Hypochromia Slight/1+     Microcytes Slight/1+     Target Cells Slight/1+     Smudge Cells Slight/1+     Scan Slide --      See Manual Differential Results       Manual Differential [[id number]]  (Abnormal) Collected:  01/19/18 1457    Specimen:  Blood Updated:  01/19/18 1556     Neutrophil % 72.0 %      Lymphocyte % 16.0 (L) %      Monocyte % 12.0 %      Neutrophils Absolute 4.37 10*3/mm3      Lymphocytes Absolute 0.97 10*3/mm3      Monocytes Absolute 0.73 10*3/mm3      Anisocytosis Mod/2+     Hypochromia Slight/1+     Microcytes Slight/1+     Target Cells Slight/1+     Smudge Cells Slight/1+     Platelet Morphology Normal    Lactic Acid, Plasma [[id number]]  (Abnormal) Collected:  01/19/18 1934    Specimen:  Blood Updated:  01/19/18 2028     Lactate 2.1 (C) mmol/L     Blood Culture - Blood, Blood, Venous Line [[id number]] Collected:  01/19/18 1934    Specimen:  Blood from Blood, Venous Line Updated:  01/19/18 1949    Lactic Acid, Reflex Timer (This will reflex a repeat order 3-3:15 hours after ordered.) [[id number]] Collected:  01/19/18 1934    Specimen:  Blood Updated:  01/19/18 2028          No orders to display     Assessment/Plan   Active Hospital Problems (** Indicates Principal Problem)    Diagnosis Date Noted   • FUO (fever of unknown origin) [R50.9] 01/19/2018   • Hyperkalemia [E87.5] 01/19/2018   • Hyponatremia [E87.1] 01/19/2018   • JEFFERY (acute kidney injury) [N17.9] 01/19/2018      Resolved Hospital Problems    Diagnosis Date Noted Date Resolved   No resolved problems to display.         Ms. Valera is a 62 y.o. female who Has cellulitis of the lower extremities along with multiple electrolyte abnormalities name of the hyperkalemia and hyponatremia.  She also has acute renal insufficiency her baseline creatinine was 0.7 last year.  She also has anemia of chronic disease along with rheumatoid arthritis and she is immunocompromised secondary to her rheumatoid arthritis treatment.  I have talked to the patient  about all these conditions and she'll be admitted.  She has already received IV fluids and we will continue IV fluids and also get a renal consultation along with infectious diseases consultation.  Continue broad-spectrum antibiotics namely meropenem and vancomycin which were started to the emergency room and blood cultures have been drawn      I discussed the patients findings and my recommendations with patient and family.      Hugo Elam MD  [facility]  01/19/18  9:48 PM

## 2018-01-20 NOTE — CONSULTS
Name: Domitila Valera  Age: 62 y.o.  : 1955  Sex: female        Chief complaint: LE swelling and fever       HPI    Pt 62 dread old with hist RA, HTN admitted with LE swelling and fever for the last one week. Pt recently discharged from [facility] treated for ? Colitis.    Pt on admission BUN 59 creat 1.7 potassium 6.0 She was hypotensive received bolus IVF . Diagnosed LE cellulitis started on ABX         Objective:    Vital Signs  Temp:  [98 °F (36.7 °C)-100.4 °F (38 °C)] 98.4 °F (36.9 °C)  Heart Rate:  [] 106  Resp:  [16-18] 18  BP: (125-166)/() 125/103        Intake/Output Summary (Last 24 hours) at 18 2255  Last data filed at 18 2113   Gross per 24 hour   Intake              500 ml   Output                0 ml   Net              500 ml           Physical Exam    Physical Exam   Lungs clear   LE edema redness   CVS S1 S2 reg   Abd soft non tender         Results Review:        Results from last 7 days  Lab Units 18  1457   SODIUM mmol/L 127*   CO2 mmol/L 22.8   BUN mg/dL 59*   CREATININE mg/dL 1.70*   CALCIUM mg/dL 9.0   ALBUMIN g/dL 2.60*   AST (SGOT) U/L 78*   ALT (SGPT) U/L 26         Results from last 7 days  Lab Units 18  1457   WBC 10*3/mm3 6.07       Pertinent Imaging studies were reviewed      Medication Review:       Current Facility-Administered Medications:   •  acetaminophen (TYLENOL) tablet 650 mg, 650 mg, Oral, Q6H PRN, Hugo Elam MD  •  cycloSPORINE (RESTASIS) 0.05 % ophthalmic emulsion 1 drop, 1 drop, Both Eyes, BID, Hugo Elam MD  •  enoxaparin (LOVENOX) syringe 30 mg, 30 mg, Subcutaneous, Q24H, Hugo Elam MD, 30 mg at 18 0704  •  [START ON 2018] estradiol (ESTRACE) tablet 1 mg, 1 mg, Oral, Daily, Hugo Elam MD  •  [START ON 2018] Ferrex 150 forte plus capsule 1 capsule, 1 capsule, Oral, Daily With Breakfast, Hugo Elam MD  •  ferrous sulfate tablet 325 mg, 325 mg, Oral, BID With Meals,  Hugo Elam MD  •  gabapentin (NEURONTIN) capsule 200 mg, 200 mg, Oral, Q12H, Hugo Elam MD  •  HYDROcodone-acetaminophen (NORCO) 5-325 MG per tablet 1 tablet, 1 tablet, Oral, Q6H PRN, Hugo Elam MD, 1 tablet at 01/19/18 6243  •  [START ON 1/20/2018] meropenem (MERREM) in SWFI 1 GRAM/20ml IV PUSH syringe, 1 g, Intravenous, Q12H, Hugo Elam MD  •  Pharmacy to dose vancomycin, , Does not apply, Continuous PRN, Hugo Elam MD  •  sodium polystyrene (KAYEXALATE) 15 GM/60ML suspension 30 g, 30 g, Oral, Once, Diego Panda MD  •  temazepam (RESTORIL) capsule 15 mg, 15 mg, Oral, Nightly PRN, Hugo Elam MD, 15 mg at 01/19/18 1212    Pharmacy to dose vancomycin        Assesment  JEFFERY secondary to decreased intravsc volume and sespsis secondary to cellulitis     HYperkalemia     Hyponatremia     LE cellulitis     RA    HTN       Plan:  UA  Urine lytes   Renal ultrasound  Treat Hyperkalemia Insulin D50   IVF   Low K diet       Vinayak Feliciano MD  01/19/18  10:55 PM  Tel: [phone]  Fax: [phone]

## 2018-01-20 NOTE — PROGRESS NOTES
Clinical Pharmacy Services: Medication History    Domitila Valera is a 62 y.o. female presenting to [facility] for   Chief Complaint   Patient presents with   • Fever     last tyl 1145   • Foot Pain     r/t arthritis       She  has a past medical history of Hypertension; Ischemic colitis; Rheumatoid arthritis; and Thyroid nodule.    Allergies as of 01/19/2018 - Arun as Reviewed 01/19/2018   Allergen Reaction Noted   • Keflex [cephalexin]  01/19/2018   • Latex  01/19/2018       Medication information was obtained from: Family, patient, medication list  Pharmacy and Phone Number: [facility] [phone]    Prior to Admission Medications     Prescriptions Last Dose Informant Patient Reported? Taking?    amLODIPine-valsartan (EXFORGE) 5-320 MG per tablet 1/18/2018 Family Member Yes Yes    Take 1 tablet by mouth Daily.    atenolol-chlorthalidone (TENORETIC) 50-25 MG per tablet 1/18/2018 Family Member Yes Yes    Take 1 tablet by mouth Daily.    cycloSPORINE (RESTASIS) 0.05 % ophthalmic emulsion  Family Member Yes Yes    Administer 1 drop to both eyes 2 (Two) Times a Day.    estradiol (ESTRACE) 1 MG tablet 1/18/2018 Family Member Yes Yes    Take 1 mg by mouth Daily.    ferrous gluconate 324 (37.5 Fe) MG tablet tablet 1/18/2018 Family Member Yes Yes    Take 324 mg by mouth 2 (Two) Times a Day With Meals.    fluconazole (DIFLUCAN) 200 MG tablet 1/18/2018 Family Member Yes Yes    Take 400 mg by mouth Daily.    Folic Acid-Vit B6-Vit B12 (B COMPLEX-FOLIC ACID PO) 1/18/2018 Family Member Yes Yes    Take 1 capsule by mouth Daily.    gabapentin (NEURONTIN) 100 MG capsule 1/18/2018 Family Member Yes Yes    Take 200 mg by mouth 3 (Three) Times a Day.    HYDROcodone-acetaminophen (NORCO) 5-325 MG per tablet 1/19/2018 Family Member Yes Yes    Take 1.5 tablets by mouth Every 6 (Six) Hours As Needed for Moderate Pain .    predniSONE (DELTASONE) 10 MG tablet  Family Member Yes No    Take 5 mg by mouth Daily.    temazepam  (RESTORIL) 15 MG capsule 1/18/2018 Family Member Yes Yes    Take 15 mg by mouth At Night As Needed for Sleep.    vitamin D (ERGOCALCIFEROL) 49657 units capsule capsule  Family Member Yes Yes    Take 50,000 Units by mouth 1 (One) Time Per Week.    Tofacitinib Citrate (XELJANZ) 5 MG tablet  Family Member Yes No    Take 1 tablet by mouth 2 (Two) Times a Day.            Medication notes: Patient was instructed by physician today to D/C Prednisone and Xeljanz due to fever    This medication list is complete to the best of my knowledge as of 1/19/2018    Please call if questions.    Lidia Bucio, Medication History Technician  1/19/2018 8:05 PM

## 2018-01-20 NOTE — PLAN OF CARE
Problem: Patient Care Overview (Adult)  Goal: Plan of Care Review  Outcome: Ongoing (interventions implemented as appropriate)   01/20/18 0112   Coping/Psychosocial Response Interventions   Plan Of Care Reviewed With patient   Patient Care Overview   Progress improving   Outcome Evaluation   Outcome Summary/Follow up Plan VSS, A&OX4, c/o leg pain tx with prn po pain med as ordered, afebrile, bicarb gtt started, reg insulin/D50 and kayexolate given as ordered, reflex lactic trending down, renal u/s in am per nephrology, ID to see in am, will monitor       Problem: Infection, Risk/Actual (Adult)  Goal: Identify Related Risk Factors and Signs and Symptoms  Outcome: Ongoing (interventions implemented as appropriate)      Problem: Fall Risk (Adult)  Goal: Identify Related Risk Factors and Signs and Symptoms  Outcome: Ongoing (interventions implemented as appropriate)    Goal: Absence of Falls  Outcome: Ongoing (interventions implemented as appropriate)      Problem: Pain, Acute (Adult)  Goal: Identify Related Risk Factors and Signs and Symptoms  Outcome: Ongoing (interventions implemented as appropriate)    Goal: Acceptable Pain Control/Comfort Level  Outcome: Ongoing (interventions implemented as appropriate)

## 2018-01-20 NOTE — NURSING NOTE
Call out to Dr. Morales. Wants Dr. Andrews to be called re: CRX Protein and Procalcitonin levels. CTM.

## 2018-01-20 NOTE — PROGRESS NOTES
"DAILY PROGRESS NOTE  [facility]    Patient Identification:  Name: Domitila Valera  Age: 62 y.o.  Sex: female  :  1955  MRN: [id number]         Primary Care Physician: Javier Soriano III, MD    Subjective:  Interval History:She complains of pain in feet.    Objective:    Scheduled Meds:    cycloSPORINE 1 drop Both Eyes BID   enoxaparin 30 mg Subcutaneous Q24H   estradiol 1 mg Oral Daily   Ferrex 150 forte plus 1 capsule Oral Daily With Breakfast   ferrous sulfate 325 mg Oral BID With Meals   gabapentin 200 mg Oral Q12H     Continuous Infusions:    custom IV infusion builder  Last Rate: 100 mL/hr at 18 1125       Vital signs in last 24 hours:  Temp:  [98 °F (36.7 °C)-100.6 °F (38.1 °C)] 100.6 °F (38.1 °C)  Heart Rate:  [] 113  Resp:  [16-20] 20  BP: (111-166)/() 111/90    Intake/Output:    Intake/Output Summary (Last 24 hours) at 18 1254  Last data filed at 18 1125   Gross per 24 hour   Intake             3170 ml   Output             1000 ml   Net             2170 ml       Exam:  /90 (BP Location: Right arm, Patient Position: Lying)  Pulse 113  Temp (!) 100.6 °F (38.1 °C) (Oral)   Resp 20  Ht 149.9 cm (59\")  Wt 63.1 kg (139 lb 1.6 oz)  SpO2 95%  BMI 28.09 kg/m2    General Appearance:    Alert, cooperative, no distress   Head:    Normocephalic, without obvious abnormality, atraumatic   Eyes:       Throat:   Lips, tongue, gums normal   Neck:   Supple, symmetrical, trachea midline, no JVD   Lungs:     Clear to auscultation bilaterally, respirations unlabored   Chest Wall:    No tenderness or deformity    Heart:    Regular rate and rhythm, S1 and S2 normal, no murmur,no  Rub or gallop   Abdomen:     Soft, non-tender, bowel sounds active, no masses, no organomegaly    Extremities:   Extremities normal, atraumatic, no cyanosis or edema, cellulitis in lower extremity.   Pulses:      Skin:   Skin is warm and dry,  no rashes or palpable lesions   Neurologic:   " no focal deficits noted      [unfilled]  Data Review:    Results from last 7 days  Lab Units 01/20/18  0445 01/19/18  1457   SODIUM mmol/L 135* 127*   POTASSIUM mmol/L 4.7 6.0*   CHLORIDE mmol/L 101 92*   CO2 mmol/L 21.6* 22.8   BUN mg/dL 48* 59*   CREATININE mg/dL 1.24* 1.70*   GLUCOSE mg/dL 69 92   CALCIUM mg/dL 8.0* 9.0       Results from last 7 days  Lab Units 01/20/18  0602 01/19/18  1457   WBC 10*3/mm3 4.88 6.07   HEMOGLOBIN g/dL 7.0* 8.1*   HEMATOCRIT % 22.9* 26.9*   PLATELETS 10*3/mm3 126* 150             No results found for: TROPONINT        Results from last 7 days  Lab Units 01/19/18  1457   ALK PHOS U/L 543*   BILIRUBIN mg/dL 0.5   ALT (SGPT) U/L 26   AST (SGOT) U/L 78*             No results found for: POCGLU        Patient Active Problem List   Diagnosis Code   • FUO (fever of unknown origin) R50.9   • Hyperkalemia E87.5   • Benign essential HTN I10   • Cellulitis of right upper arm L03.113   • Hyponatremia E87.1   • JEFFERY (acute kidney injury) N17.9   • Anemia D64.9       Assessment:  Active Hospital Problems (** Indicates Principal Problem)    Diagnosis Date Noted   • **FUO (fever of unknown origin) [R50.9] 01/19/2018   • Anemia [D64.9] 01/20/2018   • Hyperkalemia [E87.5] 01/19/2018   • Hyponatremia [E87.1] 01/19/2018   • JEFFERY (acute kidney injury) [N17.9] 01/19/2018      Resolved Hospital Problems    Diagnosis Date Noted Date Resolved   No resolved problems to display.       Plan:  ID consult noted.  Work up in progress await plans from ID.  May need transfusion    Elie Morales MD  1/20/2018  12:54 PM

## 2018-01-20 NOTE — CONSULTS
"Referring Provider: Hugo Elam MD  [id number]  [address] [address]    Reason for Consultation: fever    History of present illness:  Ms Valera is a 61 YO who I am asked to evaluate and give opinion for fever. History is obtained from the patient and review of the old medical records which I summarize/synthesize as follows: She has a history of RA and was on Xeljanz and prednisone \"for years.\" She says she was well until around Dec 2017 when she started having daily cramping sharp abdominal pain with loose stools. She was seen at [facility]. CT suggestive of either ischemic colitis or infectious colitis. She tells me she was treated with antibiotics and had some improvement. However she's had some fevers since this time and says that her rheumatologist took her off of Xeljanz and prednisone. She's also had some LE edema and B ankle pain. She came into the ER last night for fever. She denies cough, vomiting, or diarrhea. She currently denies abdominal pain. Her main complaint is thel eg swelling. In the ER she had a normal WBC but low grade fever. She was started on vancomycin and meropenem for cellulitis. She remains febrile this AM and tachycardic. Her BP is normal.    I reviewed her [place] records. She has never had positive blood cultures or C diff. TTE did not show any vegetations.    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    Family History:  No 1st degree relatives w/ FUO    Allergies:  Keflex causes rash many years ago    Medications:    Current Facility-Administered Medications:   •  acetaminophen (TYLENOL) tablet 650 mg, 650 mg, Oral, Q6H PRN, Hugo Elam MD  •  cycloSPORINE " (RESTASIS) 0.05 % ophthalmic emulsion 1 drop, 1 drop, Both Eyes, BID, Hugo Elam MD, 1 drop at 01/20/18 0003  •  dextrose (D50W) solution 50 mL, 50 mL, Intravenous, Q1H PRN, Vianyak Feliciano MD, 50 mL at 01/20/18 0003  •  enoxaparin (LOVENOX) syringe 30 mg, 30 mg, Subcutaneous, Q24H, Hugo Elam MD, 30 mg at 01/19/18 2245  •  estradiol (ESTRACE) tablet 1 mg, 1 mg, Oral, Daily, Hugo Elam MD  •  Ferrex 150 forte plus capsule 1 capsule, 1 capsule, Oral, Daily With Breakfast, Hugo Elam MD  •  ferrous sulfate tablet 325 mg, 325 mg, Oral, BID With Meals, Hugo Elam MD  •  gabapentin (NEURONTIN) capsule 200 mg, 200 mg, Oral, Q12H, Hugo Elam MD  •  HYDROcodone-acetaminophen (NORCO) 5-325 MG per tablet 1 tablet, 1 tablet, Oral, Q6H PRN, Hugo Elam MD, 1 tablet at 01/19/18 2245  •  sodium chloride 0.45 % 950 mL with sodium bicarbonate 8.4 % 50 mEq infusion, , Intravenous, Continuous, Vinayak Feliciano MD, Last Rate: 100 mL/hr at 01/20/18 0059  •  temazepam (RESTORIL) capsule 15 mg, 15 mg, Oral, Nightly PRN, Hugo Elam MD, 15 mg at 01/19/18 2245      Review of Systems  All systems were reviewed and are negative unless otherwise stated above in the HPI    Objective   Vital Signs   Temp:  [98 °F (36.7 °C)-100.6 °F (38.1 °C)] 100.6 °F (38.1 °C)  Heart Rate:  [] 123  Resp:  [16-20] 20  BP: (111-166)/() 111/90    Physical Exam:   General: awake, alert, chronically ill appearing   Head: Normocephalic, atraumatic  Eyes: PERRL, EOMI, no scleral icterus, no conjunctival pallor, no conjunctival hemorrhages.   ENT: MM dry, cracked lips, OP clear, no thrush. Fair dentition.   Neck: Supple, no visible thyromegaly  Cardiovascular: tachycardic, RR, no murmurs, rubs, or gallops; 1+ pitting LE edema  Respiratory: Lungs are clear to ascultation bilaterally, no rales or wheezing; mild tachypnea on ambient air  GI: Abdomen is soft, non-tender, non-distended,  normal bowel sounds in all four quadrants; no hepatosplenomegaly, no masses palpated  : no Pineda catheter present  Musculoskeletal: B ankle tenderness  Skin: thin skin on shins, no significant erythema  Neurological: Alert and oriented x 3, cranial nerves 2-12 grossly intact, motor strength 5/5 in all four extremities  Psychiatric: Normal mood and affect   Lymph: no pre-auricular, post-auricular, submandibular, cervical, supraclavicular  LAD  Vasc: no cyanosis; PIV w/o erythema    Labs:     Lab Results   Component Value Date    WBC 4.88 01/20/2018    HGB 7.0 (L) 01/20/2018    HCT 22.9 (L) 01/20/2018    MCV 69.2 (L) 01/20/2018     (L) 01/20/2018       Lab Results   Component Value Date    GLUCOSE 69 01/20/2018    BUN 48 (H) 01/20/2018    CREATININE 1.24 (H) 01/20/2018    EGFRIFAFRI 53 (L) 01/20/2018    BCR 38.7 (H) 01/20/2018    CO2 21.6 (L) 01/20/2018    CALCIUM 8.0 (L) 01/20/2018    ALBUMIN 2.00 (L) 01/20/2018    LABIL2 0.5 01/19/2018    AST 78 (H) 01/19/2018    ALT 26 01/19/2018       Microbiology:  1/19 BCx: NGTD  1/20 RVP: pending     Radiology (personally reviewed images/report):  Renal US negative for mass    Assessment/Plan   1. Fever of unknown origin  2. B ankle pain and swelling  3. Rheumatoid arthritis  4. History of ischemic colitis    She needs a workup. I have ordered CT C/A/P, B ankle films, urine Histo antigen given steroids and Xeljanz treatments, cortisol given rapid cessation of steroids, procalcitonin, and CRP. Follow-up blood cultures. I would actually favor stopping her antibiotics until we have any objective evidence that she has a bacterial infection. The potential side effects of vancomycin and meropenem (C diff, JEFFERY, breeding resistance) are greater than any known benefit at this time.    Thank you for this consult. ID will follow.

## 2018-01-20 NOTE — PROGRESS NOTES
"   LOS: 1 day   Patient Care Team:  Javier Soriano III, MD as PCP - General (Internal Medicine)    Chief Complaint/ Reason for encounter: Acute renal failure, hyponatremia        Subjective     History of Present Illness    Subjective:  Symptoms:  Improved.  She reports malaise and weakness.  No shortness of breath or chest pain.  (Somewhat lethargic but arousable  Discussed with her son at bedside who states she is somewhat more alert today).    Diet:  Poor intake.  No nausea.    Activity level: Impaired due to weakness.    Pain:  She reports no pain.          History taken from: Patient and chart    Objective     Vital Signs  Temp:  [98 °F (36.7 °C)-100.6 °F (38.1 °C)] 100.6 °F (38.1 °C)  Heart Rate:  [] 113  Resp:  [16-20] 20  BP: (111-166)/() 111/90       Wt Readings from Last 1 Encounters:   01/20/18 0546 63.1 kg (139 lb 1.6 oz)   01/19/18 2243 61.6 kg (135 lb 14.4 oz)   01/19/18 1351 57.6 kg (127 lb)       Objective:  General Appearance:  Comfortable, ill-appearing, in no acute distress and not in pain (Thin, mildly cachectic).    Vital signs: (most recent): Blood pressure 111/90, pulse 113, temperature (!) 100.6 °F (38.1 °C), temperature source Oral, resp. rate 20, height 149.9 cm (59\"), weight 63.1 kg (139 lb 1.6 oz), SpO2 95 %.  Vital signs are normal.  Fever.    Output: Producing urine.    HEENT: Normal HEENT exam.    Lungs:  Normal respiratory rate and normal effort.  She is not in respiratory distress.  Breath sounds clear to auscultation.  There are decreased breath sounds.  No wheezes or rales.    Heart: Normal rate.  Regular rhythm.    Abdomen: Abdomen is soft and non-distended.  Bowel sounds are normal.   There is no abdominal tenderness.  There is no epigastric area or no suprapubic area tenderness.  There is no rebound tenderness.   There is no mass.   Extremities: Normal range of motion.  There is dependent edema.  There is no deformity.    Pulses: Distal pulses are intact.  "   Neurological: Patient is alert.    Skin:  Warm and dry.  There is a rash.  No ecchymosis or ulceration.             Results Review:    Past Medical History: reviewed and updated  Past Medical History:   Diagnosis Date   • Hypertension    • Ischemic colitis    • Rheumatoid arthritis    • Thyroid nodule          Allergies:  Allergies   Allergen Reactions   • Buffered Aspirin Rash and Shortness Of Breath   • Ace Inhibitors      cough   • Aurothioglucose Diarrhea     Increased AST & ALT   • Latex Itching   • Keflex [Cephalexin] Hives and Rash       Intake/Output:     Intake/Output Summary (Last 24 hours) at 01/20/18 1325  Last data filed at 01/20/18 1125   Gross per 24 hour   Intake             3170 ml   Output             1000 ml   Net             2170 ml         DATA:  Interval chart, labs and notes reviewed.  The following labs personally reviewed by me.  Renal ultrasound personally reviewed, no obstruction  I personally reviewed her old records from [facility], baseline creatinine 1.4  Discussed with her son at bedside, interval history obtained from him as well regarding recent health and complaints    Labs:   Recent Results (from the past 24 hour(s))   Comprehensive Metabolic Panel    Collection Time: 01/19/18  2:57 PM   Result Value Ref Range    Glucose 92 65 - 99 mg/dL    BUN 59 (H) 8 - 23 mg/dL    Creatinine 1.70 (H) 0.57 - 1.00 mg/dL    Sodium 127 (L) 136 - 145 mmol/L    Potassium 6.0 (H) 3.5 - 5.2 mmol/L    Chloride 92 (L) 98 - 107 mmol/L    CO2 22.8 22.0 - 29.0 mmol/L    Calcium 9.0 8.6 - 10.5 mg/dL    Total Protein 8.1 6.0 - 8.5 g/dL    Albumin 2.60 (L) 3.50 - 5.20 g/dL    ALT (SGPT) 26 1 - 33 U/L    AST (SGOT) 78 (H) 1 - 32 U/L    Alkaline Phosphatase 543 (H) 39 - 117 U/L    Total Bilirubin 0.5 0.1 - 1.2 mg/dL    eGFR  African Amer 37 (L) >60 mL/min/1.73    Globulin 5.5 gm/dL    A/G Ratio 0.5 g/dL    BUN/Creatinine Ratio 34.7 (H) 7.0 - 25.0    Anion Gap 12.2 mmol/L   CBC Auto Differential    Collection  Time: 01/19/18  2:57 PM   Result Value Ref Range    WBC 6.07 4.50 - 10.70 10*3/mm3    RBC 3.85 (L) 3.90 - 5.20 10*6/mm3    Hemoglobin 8.1 (L) 11.9 - 15.5 g/dL    Hematocrit 26.9 (L) 35.6 - 45.5 %    MCV 69.9 (L) 80.5 - 98.2 fL    MCH 21.0 (L) 26.9 - 32.0 pg    MCHC 30.1 (L) 32.4 - 36.3 g/dL    RDW 19.9 (H) 11.7 - 13.0 %    RDW-SD 49.6 37.0 - 54.0 fl    Platelets 150 140 - 500 10*3/mm3   Scan Slide    Collection Time: 01/19/18  2:57 PM   Result Value Ref Range    Anisocytosis Mod/2+ None Seen    Hypochromia Slight/1+ None Seen    Microcytes Slight/1+ None Seen    Target Cells Slight/1+ None Seen    Smudge Cells Slight/1+ None Seen    Scan Slide     Manual Differential    Collection Time: 01/19/18  2:57 PM   Result Value Ref Range    Neutrophil % 72.0 42.7 - 76.0 %    Lymphocyte % 16.0 (L) 19.6 - 45.3 %    Monocyte % 12.0 5.0 - 12.0 %    Neutrophils Absolute 4.37 1.90 - 8.10 10*3/mm3    Lymphocytes Absolute 0.97 0.90 - 4.80 10*3/mm3    Monocytes Absolute 0.73 0.20 - 1.20 10*3/mm3    Anisocytosis Mod/2+ None Seen    Hypochromia Slight/1+ None Seen    Microcytes Slight/1+ None Seen    Target Cells Slight/1+ None Seen    Smudge Cells Slight/1+ None Seen    Platelet Morphology Normal Normal   Lactic Acid, Plasma    Collection Time: 01/19/18  7:34 PM   Result Value Ref Range    Lactate 2.1 (C) 0.5 - 2.0 mmol/L   Blood Culture - Blood, Blood, Venous Line    Collection Time: 01/19/18  7:34 PM   Result Value Ref Range    Blood Culture No growth at less than 24 hours    Lactic Acid, Reflex Timer (This will reflex a repeat order 3-3:15 hours after ordered.)    Collection Time: 01/19/18  7:34 PM   Result Value Ref Range    Extra Tube Hold for add-ons.    Blood Culture - Blood, Blood, Venous Line    Collection Time: 01/19/18 10:02 PM   Result Value Ref Range    Blood Culture No growth at less than 24 hours    Lactic Acid, Reflex    Collection Time: 01/20/18 12:29 AM   Result Value Ref Range    Lactate 1.7 0.5 - 2.0 mmol/L    Creatinine, Urine, Random - Urine, Clean Catch    Collection Time: 01/20/18  2:19 AM   Result Value Ref Range    Creatinine, Urine 40.1 mg/dL   Urinalysis - Urine, Clean Catch    Collection Time: 01/20/18  2:19 AM   Result Value Ref Range    Color, UA Yellow Yellow, Straw    Appearance, UA Clear Clear    pH, UA 6.5 5.0 - 8.0    Specific Gravity, UA 1.011 1.005 - 1.030    Glucose, UA Negative Negative    Ketones, UA Negative Negative    Bilirubin, UA Negative Negative    Blood, UA Trace (A) Negative    Protein,  mg/dL (2+) (A) Negative    Leuk Esterase, UA Negative Negative    Nitrite, UA Negative Negative    Urobilinogen, UA 1.0 E.U./dL 0.2 - 1.0 E.U./dL   Sodium, Urine, Random -    Collection Time: 01/20/18  2:20 AM   Result Value Ref Range    Sodium, Urine 75 mmol/L   Renal Function Panel    Collection Time: 01/20/18  4:45 AM   Result Value Ref Range    Glucose 69 65 - 99 mg/dL    BUN 48 (H) 8 - 23 mg/dL    Creatinine 1.24 (H) 0.57 - 1.00 mg/dL    Sodium 135 (L) 136 - 145 mmol/L    Potassium 4.7 3.5 - 5.2 mmol/L    Chloride 101 98 - 107 mmol/L    CO2 21.6 (L) 22.0 - 29.0 mmol/L    Calcium 8.0 (L) 8.6 - 10.5 mg/dL    Albumin 2.00 (L) 3.50 - 5.20 g/dL    Phosphorus 4.3 2.5 - 4.5 mg/dL    Anion Gap 12.4 mmol/L    BUN/Creatinine Ratio 38.7 (H) 7.0 - 25.0    eGFR  African Amer 53 (L) >60 mL/min/1.73   Procalcitonin    Collection Time: 01/20/18  4:45 AM   Result Value Ref Range    Procalcitonin 1.01 (C) 0.10 - 0.25 ng/mL   C-reactive Protein    Collection Time: 01/20/18  4:45 AM   Result Value Ref Range    C-Reactive Protein 10.72 (H) 0.00 - 0.50 mg/dL   Cortisol    Collection Time: 01/20/18  4:45 AM   Result Value Ref Range    Cortisol 7.97   mcg/dL   CBC Auto Differential    Collection Time: 01/20/18  6:02 AM   Result Value Ref Range    WBC 4.88 4.50 - 10.70 10*3/mm3    RBC 3.31 (L) 3.90 - 5.20 10*6/mm3    Hemoglobin 7.0 (L) 11.9 - 15.5 g/dL    Hematocrit 22.9 (L) 35.6 - 45.5 %    MCV 69.2 (L) 80.5 - 98.2 fL     MCH 21.1 (L) 26.9 - 32.0 pg    MCHC 30.6 (L) 32.4 - 36.3 g/dL    RDW 19.9 (H) 11.7 - 13.0 %    RDW-SD 49.3 37.0 - 54.0 fl    Platelets 126 (L) 140 - 500 10*3/mm3    Neutrophil % 50.5 42.7 - 76.0 %    Lymphocyte % 42.4 19.6 - 45.3 %    Monocyte % 5.7 5.0 - 12.0 %    Eosinophil % 0.4 0.3 - 6.2 %    Basophil % 0.2 0.0 - 1.5 %    Immature Grans % 0.8 (H) 0.0 - 0.5 %    Neutrophils, Absolute 2.46 1.90 - 8.10 10*3/mm3    Lymphocytes, Absolute 2.07 0.90 - 4.80 10*3/mm3    Monocytes, Absolute 0.28 0.20 - 1.20 10*3/mm3    Eosinophils, Absolute 0.02 0.00 - 0.70 10*3/mm3    Basophils, Absolute 0.01 0.00 - 0.20 10*3/mm3    Immature Grans, Absolute 0.04 (H) 0.00 - 0.03 10*3/mm3   Scan Slide    Collection Time: 01/20/18  6:02 AM   Result Value Ref Range    Anisocytosis Mod/2+ None Seen    Hypochromia Mod/2+ None Seen    Macrocytes Mod/2+ None Seen    RBC Fragments Slight/1+ None Seen    Target Cells Slight/1+ None Seen    WBC Morphology Normal Normal    Platelet Morphology Normal Normal   Respiratory Panel, PCR - Swab, Nasopharynx    Collection Time: 01/20/18 11:27 AM   Result Value Ref Range    ADENOVIRUS, PCR Not Detected Not Detected    Coronavirus 229E Not Detected Not Detected    Coronavirus HKU1 Not Detected Not Detected    Coronavirus NL63 Not Detected Not Detected    Coronavirus OC43 Not Detected Not Detected    Human Metapneumovirus Not Detected Not Detected    Human Rhinovirus/Enterovirus Not Detected Not Detected    Influenza B PCR Not Detected Not Detected    Parainfluenza Virus 1 Not Detected Not Detected    Parainfluenza Virus 2 Not Detected Not Detected    Parainfluenza Virus 3 Not Detected Not Detected    Parainfluenza Virus 4 Not Detected Not Detected    Bordetella pertussis pcr Not Detected Not Detected    Influenza A H1N1 2009 PCR Not Detected Not Detected    Chlamydophila pneumoniae PCR Not Detected Not Detected    Mycoplasma pneumo by PCR Not Detected Not Detected    Influenza A PCR Not Detected Not  Detected    Influenza A H3 Not Detected Not Detected    Influenza A H1 Not Detected Not Detected    RSV, PCR Not Detected Not Detected       Radiology:  Imaging Results (last 24 hours)     Procedure Component Value Units Date/Time    US Renal Bilateral [[id number]] Collected:  01/20/18 0823     Updated:  01/20/18 0827    Narrative:       US RENAL BILATERAL-     INDICATIONS: Acute kidney injury     TECHNIQUE: ULTRASOUND OF THE KIDNEYS AND URINARY BLADDER.     COMPARISON: None available     FINDINGS:     The right kidney measures 10.0 centimeters, the left kidney measures 9.6  centimeters.     No renal lesion is identified. A few echogenic foci are apparent in the  right kidney, as large as 3 mm, suggesting small nonobstructive stones.  No hydronephrosis or echogenic left nephrolithiasis.     No ureteral jets were observed during the exam. The urinary bladder  otherwise appears unremarkable.       Impression:       No hydronephrosis.     This report was finalized on 1/20/2018 8:24 AM by Dr. Mt Torres MD.                Medications have been reviewed:  Current Facility-Administered Medications   Medication Dose Route Frequency Provider Last Rate Last Dose   • acetaminophen (TYLENOL) tablet 650 mg  650 mg Oral Q6H PRN Hugo Elam MD   650 mg at 01/20/18 1121   • cycloSPORINE (RESTASIS) 0.05 % ophthalmic emulsion 1 drop  1 drop Both Eyes BID Hugo Elam MD   1 drop at 01/20/18 1001   • dextrose (D50W) solution 50 mL  50 mL Intravenous Q1H PRN Vinayak Feliciano MD   50 mL at 01/20/18 0003   • enoxaparin (LOVENOX) syringe 30 mg  30 mg Subcutaneous Q24H Hugo Elam MD   30 mg at 01/19/18 2245   • estradiol (ESTRACE) tablet 1 mg  1 mg Oral Daily Hugo Elam MD   1 mg at 01/20/18 1024   • Ferrex 150 forte plus capsule 1 capsule  1 capsule Oral Daily With Breakfast Hugo Elam MD   1 capsule at 01/20/18 1013   • ferrous sulfate tablet 325 mg  325 mg Oral BID With Meals Hugo  CONSTANZA Elam MD   325 mg at 01/20/18 1001   • gabapentin (NEURONTIN) capsule 200 mg  200 mg Oral Q12H Hugo Elam MD       • HYDROcodone-acetaminophen (NORCO) 5-325 MG per tablet 1 tablet  1 tablet Oral Q6H PRN Hugo Elam MD   1 tablet at 01/20/18 1001   • sodium chloride 0.45 % 950 mL with sodium bicarbonate 8.4 % 50 mEq infusion   Intravenous Continuous Vinayak Feliciano  mL/hr at 01/20/18 1125     • temazepam (RESTORIL) capsule 15 mg  15 mg Oral Nightly PRN Hugo Elam MD   15 mg at 01/19/18 2245       Assessment/Plan     Principal Problem:    FUO (fever of unknown origin)  Active Problems:    Hyperkalemia    Hyponatremia    JEFFERY (acute kidney injury)    Anemia      Assessment:  (Assesment  JEFFERY secondary to decreased intravsc volume and sespsis secondary to cellulitis , improved today    Chronic kidney disease, recent baseline creatinine around 1.4     HYperkalemia , improved     Hyponatremia , hypovolemic, better today with fluids     LE cellulitis      RA     HTN         Plan:  Renal function, hyponatremia and hyperkalemia all improved today  Continue IV fluids  Old records reviewed, recent baseline creatinine has been around 1.4  Renal ultrasound shows no obstruction, urine studies otherwise unremarkable  Continue current IV   Monitor vancomycin levels closely to avoid potential nephrotoxicity).             Continue to monitor renal function, electroytes and volume closely   Please call me with any questions or concerns      Kip Roberson MD   [facility]   [phone]    01/20/18  1:25 PM      Dictation performed using Dragon dictation software

## 2018-01-21 ENCOUNTER — APPOINTMENT (OUTPATIENT)
Dept: GENERAL RADIOLOGY | Facility: HOSPITAL | Age: 63
End: 2018-01-21

## 2018-01-21 PROBLEM — K92.2 GI BLEED: Status: ACTIVE | Noted: 2018-01-21

## 2018-01-21 LAB
ABO GROUP BLD: NORMAL
ALBUMIN SERPL-MCNC: 1.9 G/DL (ref 3.5–5.2)
ALBUMIN/GLOB SERPL: 0.4 G/DL
ALP SERPL-CCNC: 392 U/L (ref 39–117)
ALT SERPL W P-5'-P-CCNC: 20 U/L (ref 1–33)
ANION GAP SERPL CALCULATED.3IONS-SCNC: 12.6 MMOL/L
APPEARANCE CSF: ABNORMAL
AST SERPL-CCNC: 57 U/L (ref 1–32)
BILIRUB SERPL-MCNC: 0.4 MG/DL (ref 0.1–1.2)
BLD GP AB SCN SERPL QL: NEGATIVE
BUN BLD-MCNC: 34 MG/DL (ref 8–23)
BUN/CREAT SERPL: 29.8 (ref 7–25)
CALCIUM SPEC-SCNC: 7.7 MG/DL (ref 8.6–10.5)
CHLORIDE SERPL-SCNC: 97 MMOL/L (ref 98–107)
CO2 SERPL-SCNC: 25.4 MMOL/L (ref 22–29)
COLOR CSF: ABNORMAL
CREAT BLD-MCNC: 1.14 MG/DL (ref 0.57–1)
DEPRECATED RDW RBC AUTO: 47.8 FL (ref 37–54)
ERYTHROCYTE [DISTWIDTH] IN BLOOD BY AUTOMATED COUNT: 19.6 % (ref 11.7–13)
GFR SERPL CREATININE-BSD FRML MDRD: 58 ML/MIN/1.73
GLOBULIN UR ELPH-MCNC: 4.8 GM/DL
GLUCOSE BLD-MCNC: 80 MG/DL (ref 65–99)
GLUCOSE CSF-MCNC: 45 MG/DL (ref 40–70)
HCT VFR BLD AUTO: 21.9 % (ref 35.6–45.5)
HCT VFR BLD AUTO: 25.4 % (ref 35.6–45.5)
HCV AB SER DONR QL: NORMAL
HGB BLD-MCNC: 6.6 G/DL (ref 11.9–15.5)
HGB BLD-MCNC: 8 G/DL (ref 11.9–15.5)
HIV1 P24 AG SER QL: NORMAL
HIV1+2 AB SER QL: NORMAL
LDH SERPL-CCNC: 368 U/L (ref 135–214)
LYMPHOCYTES NFR CSF MANUAL: 86 %
MAGNESIUM SERPL-MCNC: 1.9 MG/DL (ref 1.6–2.4)
MCH RBC QN AUTO: 20.8 PG (ref 26.9–32)
MCHC RBC AUTO-ENTMCNC: 30.1 G/DL (ref 32.4–36.3)
MCV RBC AUTO: 69.1 FL (ref 80.5–98.2)
MONOCYTES NFR CSF MANUAL: 5 %
MONOS+MACROS NFR CSF: 7 %
NEUTROPHILS NFR CSF MICRO: 2 %
NUC CELL # CSF MANUAL: 11 /MM3 (ref 0–5)
OTHER CELLS CSF: 1 /100 WBCS
PLATELET # BLD AUTO: 121 10*3/MM3 (ref 140–500)
POTASSIUM BLD-SCNC: 4.1 MMOL/L (ref 3.5–5.2)
PROT CSF-MCNC: 48 MG/DL (ref 15–45)
PROT SERPL-MCNC: 6.7 G/DL (ref 6–8.5)
RBC # BLD AUTO: 3.17 10*6/MM3 (ref 3.9–5.2)
RBC # CSF MANUAL: 1640 /MM3 (ref 0–0)
RH BLD: POSITIVE
SODIUM BLD-SCNC: 135 MMOL/L (ref 136–145)
TUBE # CSF: 1
WBC NRBC COR # BLD: 5.23 10*3/MM3 (ref 4.5–10.7)

## 2018-01-21 PROCEDURE — 25010000002 VANCOMYCIN 10 G RECONSTITUTED SOLUTION: Performed by: HOSPITALIST

## 2018-01-21 PROCEDURE — 85018 HEMOGLOBIN: CPT | Performed by: HOSPITALIST

## 2018-01-21 PROCEDURE — P9016 RBC LEUKOCYTES REDUCED: HCPCS

## 2018-01-21 PROCEDURE — 85014 HEMATOCRIT: CPT | Performed by: HOSPITALIST

## 2018-01-21 PROCEDURE — 80053 COMPREHEN METABOLIC PANEL: CPT | Performed by: INTERNAL MEDICINE

## 2018-01-21 PROCEDURE — 87070 CULTURE OTHR SPECIMN AEROBIC: CPT | Performed by: INTERNAL MEDICINE

## 2018-01-21 PROCEDURE — 25010000002 CEFEPIME PER 500 MG: Performed by: INTERNAL MEDICINE

## 2018-01-21 PROCEDURE — 86803 HEPATITIS C AB TEST: CPT | Performed by: INTERNAL MEDICINE

## 2018-01-21 PROCEDURE — 93010 ELECTROCARDIOGRAM REPORT: CPT | Performed by: INTERNAL MEDICINE

## 2018-01-21 PROCEDURE — G0432 EIA HIV-1/HIV-2 SCREEN: HCPCS | Performed by: INTERNAL MEDICINE

## 2018-01-21 PROCEDURE — 85027 COMPLETE CBC AUTOMATED: CPT | Performed by: INTERNAL MEDICINE

## 2018-01-21 PROCEDURE — 99233 SBSQ HOSP IP/OBS HIGH 50: CPT | Performed by: INTERNAL MEDICINE

## 2018-01-21 PROCEDURE — 83735 ASSAY OF MAGNESIUM: CPT | Performed by: HOSPITALIST

## 2018-01-21 PROCEDURE — 77003 FLUOROGUIDE FOR SPINE INJECT: CPT

## 2018-01-21 PROCEDURE — 86901 BLOOD TYPING SEROLOGIC RH(D): CPT

## 2018-01-21 PROCEDURE — 93005 ELECTROCARDIOGRAM TRACING: CPT | Performed by: HOSPITALIST

## 2018-01-21 PROCEDURE — 86850 RBC ANTIBODY SCREEN: CPT | Performed by: HOSPITALIST

## 2018-01-21 PROCEDURE — 86592 SYPHILIS TEST NON-TREP QUAL: CPT | Performed by: INTERNAL MEDICINE

## 2018-01-21 PROCEDURE — 82945 GLUCOSE OTHER FLUID: CPT | Performed by: INTERNAL MEDICINE

## 2018-01-21 PROCEDURE — 87205 SMEAR GRAM STAIN: CPT | Performed by: INTERNAL MEDICINE

## 2018-01-21 PROCEDURE — 89051 BODY FLUID CELL COUNT: CPT | Performed by: INTERNAL MEDICINE

## 2018-01-21 PROCEDURE — 83615 LACTATE (LD) (LDH) ENZYME: CPT | Performed by: INTERNAL MEDICINE

## 2018-01-21 PROCEDURE — 86923 COMPATIBILITY TEST ELECTRIC: CPT

## 2018-01-21 PROCEDURE — 86900 BLOOD TYPING SEROLOGIC ABO: CPT

## 2018-01-21 PROCEDURE — 30233N1 TRANSFUSION OF NONAUTOLOGOUS RED BLOOD CELLS INTO PERIPHERAL VEIN, PERCUTANEOUS APPROACH: ICD-10-PCS | Performed by: HOSPITALIST

## 2018-01-21 PROCEDURE — 99223 1ST HOSP IP/OBS HIGH 75: CPT | Performed by: INTERNAL MEDICINE

## 2018-01-21 PROCEDURE — 87015 SPECIMEN INFECT AGNT CONCNTJ: CPT | Performed by: INTERNAL MEDICINE

## 2018-01-21 PROCEDURE — 36430 TRANSFUSION BLD/BLD COMPNT: CPT

## 2018-01-21 PROCEDURE — 87529 HSV DNA AMP PROBE: CPT | Performed by: INTERNAL MEDICINE

## 2018-01-21 PROCEDURE — 86900 BLOOD TYPING SEROLOGIC ABO: CPT | Performed by: HOSPITALIST

## 2018-01-21 PROCEDURE — 86901 BLOOD TYPING SEROLOGIC RH(D): CPT | Performed by: HOSPITALIST

## 2018-01-21 PROCEDURE — 87899 AGENT NOS ASSAY W/OPTIC: CPT | Performed by: INTERNAL MEDICINE

## 2018-01-21 PROCEDURE — 84157 ASSAY OF PROTEIN OTHER: CPT | Performed by: INTERNAL MEDICINE

## 2018-01-21 RX ORDER — PANTOPRAZOLE SODIUM 40 MG/10ML
40 INJECTION, POWDER, LYOPHILIZED, FOR SOLUTION INTRAVENOUS
Status: DISCONTINUED | OUTPATIENT
Start: 2018-01-21 | End: 2018-01-26 | Stop reason: ALTCHOICE

## 2018-01-21 RX ORDER — AMLODIPINE BESYLATE 5 MG/1
5 TABLET ORAL
Status: DISCONTINUED | OUTPATIENT
Start: 2018-01-21 | End: 2018-01-27 | Stop reason: HOSPADM

## 2018-01-21 RX ORDER — VANCOMYCIN HYDROCHLORIDE 1 G/200ML
1000 INJECTION, SOLUTION INTRAVENOUS EVERY 24 HOURS
Status: DISCONTINUED | OUTPATIENT
Start: 2018-01-22 | End: 2018-01-24

## 2018-01-21 RX ORDER — SODIUM CHLORIDE 9 MG/ML
75 INJECTION, SOLUTION INTRAVENOUS CONTINUOUS
Status: DISCONTINUED | OUTPATIENT
Start: 2018-01-21 | End: 2018-01-22

## 2018-01-21 RX ORDER — HYDRALAZINE HYDROCHLORIDE 20 MG/ML
10 INJECTION INTRAMUSCULAR; INTRAVENOUS EVERY 6 HOURS PRN
Status: DISCONTINUED | OUTPATIENT
Start: 2018-01-21 | End: 2018-01-27 | Stop reason: HOSPADM

## 2018-01-21 RX ORDER — LIDOCAINE HYDROCHLORIDE 10 MG/ML
10 INJECTION, SOLUTION INFILTRATION; PERINEURAL ONCE
Status: COMPLETED | OUTPATIENT
Start: 2018-01-21 | End: 2018-01-21

## 2018-01-21 RX ADMIN — VANCOMYCIN HYDROCHLORIDE 1250 MG: 100 INJECTION, POWDER, LYOPHILIZED, FOR SOLUTION INTRAVENOUS at 18:38

## 2018-01-21 RX ADMIN — ESTRADIOL 1 MG: 1 TABLET ORAL at 08:19

## 2018-01-21 RX ADMIN — ACETAMINOPHEN 650 MG: 325 TABLET ORAL at 17:33

## 2018-01-21 RX ADMIN — HYDROCODONE BITARTRATE AND ACETAMINOPHEN 1 TABLET: 5; 325 TABLET ORAL at 08:18

## 2018-01-21 RX ADMIN — CYCLOSPORINE 1 DROP: 0.5 EMULSION OPHTHALMIC at 21:47

## 2018-01-21 RX ADMIN — WATER 2 G: 1 INJECTION INTRAMUSCULAR; INTRAVENOUS; SUBCUTANEOUS at 18:38

## 2018-01-21 RX ADMIN — SODIUM BICARBONATE: 84 INJECTION, SOLUTION INTRAVENOUS at 07:19

## 2018-01-21 RX ADMIN — ACETAMINOPHEN 650 MG: 325 TABLET ORAL at 08:18

## 2018-01-21 RX ADMIN — SODIUM CHLORIDE 75 ML/HR: 9 INJECTION, SOLUTION INTRAVENOUS at 17:30

## 2018-01-21 RX ADMIN — HYDROCODONE BITARTRATE AND ACETAMINOPHEN 1 TABLET: 5; 325 TABLET ORAL at 12:33

## 2018-01-21 RX ADMIN — HYDROCODONE BITARTRATE AND ACETAMINOPHEN 1 TABLET: 5; 325 TABLET ORAL at 03:12

## 2018-01-21 RX ADMIN — ACETAMINOPHEN 650 MG: 325 TABLET ORAL at 00:51

## 2018-01-21 RX ADMIN — CYCLOSPORINE 1 DROP: 0.5 EMULSION OPHTHALMIC at 08:19

## 2018-01-21 RX ADMIN — AMLODIPINE BESYLATE 5 MG: 5 TABLET ORAL at 17:34

## 2018-01-21 RX ADMIN — PANTOPRAZOLE SODIUM 40 MG: 40 INJECTION, POWDER, FOR SOLUTION INTRAVENOUS at 18:38

## 2018-01-21 RX ADMIN — LIDOCAINE HYDROCHLORIDE 10 ML: 10 INJECTION, SOLUTION INFILTRATION; PERINEURAL at 15:22

## 2018-01-21 RX ADMIN — HYDROCODONE BITARTRATE AND ACETAMINOPHEN 1 TABLET: 5; 325 TABLET ORAL at 17:34

## 2018-01-21 NOTE — CONSULTS
[facility]              Initial Inpatient Consult Note  Referring Provider: Carmen  Reason for Consultation: GI bleed    Subjective     History of present illness:  This is a patient who is been in and out of the hospital over the last several months.  She was admitted to [facility] in early December of last year with acute abdominal pain and bloody diarrhea.  CT scan demonstrated left-sided colitis and she was assessed to have ischemic colitis.  She improved with supportive care.  She was admitted to [facility] earlier this month with lower extremity cellulitis and was in the hospital for about 9 days. she was also noted to have iron deficiency anemia, the etiology of this was not clear.  She was admitted to this institution with fever and chills and is undergone evaluation including multiple blood cultures, CT scans, and a lumbar puncture performed earlier today.  Patient states that she is seeing Dr. Iglesia Norwood for GI issues in the past.  She is not a very good historian and has trouble with dates and details and really is not able to provide much information about that.  In any case she was noted to have multiple loose stools and her hemoglobin dropped into the mid 6 range and we were consulted.  She has a variety of joint complaints related to her underlying rheumatoid arthritis and she sees Dr. Tillman for that.  When she was at [place] she was noted to have multiple small lesions of the liver.  MRI was obtained.  These lesions were rapidly enhancing and they were believed to be multiple hemangiomas, although the radiography for raise the possibility of hypervascular metastatic disease.    Past Medical History:  Past Medical History:   Diagnosis Date   • Hypertension    • Ischemic colitis    • Rheumatoid arthritis    • Thyroid nodule        Past Surgical History:  Past Surgical History:   Procedure Laterality Date   • ANKLE SURGERY Right    •   SECTION     • HYSTERECTOMY     • JOINT REPLACEMENT Bilateral     knee        Social History:   Social History   Substance Use Topics   • Smoking status: Never Smoker   • Smokeless tobacco: Not on file   • Alcohol use No        Family History:  History reviewed. No pertinent family history.    Home Meds:  Prescriptions Prior to Admission   Medication Sig Dispense Refill Last Dose   • amLODIPine-valsartan (EXFORGE) 5-320 MG per tablet Take 1 tablet by mouth Daily.   1/18/2018 at Unknown time   • atenolol-chlorthalidone (TENORETIC) 50-25 MG per tablet Take 1 tablet by mouth Daily.   1/18/2018 at Unknown time   • cycloSPORINE (RESTASIS) 0.05 % ophthalmic emulsion Administer 1 drop to both eyes 2 (Two) Times a Day.      • estradiol (ESTRACE) 1 MG tablet Take 1 mg by mouth Daily.   1/18/2018 at Unknown time   • ferrous gluconate 324 (37.5 Fe) MG tablet tablet Take 324 mg by mouth 2 (Two) Times a Day With Meals.   1/18/2018 at Unknown time   • fluconazole (DIFLUCAN) 200 MG tablet Take 400 mg by mouth Daily.   1/18/2018 at Unknown time   • Folic Acid-Vit B6-Vit B12 (B COMPLEX-FOLIC ACID PO) Take 1 capsule by mouth Daily.   1/18/2018 at Unknown time   • gabapentin (NEURONTIN) 100 MG capsule Take 200 mg by mouth 3 (Three) Times a Day.   1/18/2018 at Unknown time   • HYDROcodone-acetaminophen (NORCO) 5-325 MG per tablet Take 1.5 tablets by mouth Every 6 (Six) Hours As Needed for Moderate Pain .   1/19/2018 at 1520   • temazepam (RESTORIL) 15 MG capsule Take 15 mg by mouth At Night As Needed for Sleep.   1/18/2018 at Unknown time   • vitamin D (ERGOCALCIFEROL) 27420 units capsule capsule Take 50,000 Units by mouth 1 (One) Time Per Week.      • predniSONE (DELTASONE) 10 MG tablet Take 5 mg by mouth Daily.      • Tofacitinib Citrate (XELJANZ) 5 MG tablet Take 1 tablet by mouth 2 (Two) Times a Day.          Current Meds:     amLODIPine 5 mg Oral Q24H   cefepime 2 g Intravenous Q12H   cycloSPORINE 1 drop Both Eyes BID   estradiol 1  mg Oral Daily   Ferrex 150 forte plus 1 capsule Oral Daily With Breakfast   ferrous sulfate 325 mg Oral BID With Meals   gabapentin 200 mg Oral Q12H   pantoprazole 40 mg Intravenous BID AC   [START ON 1/22/2018] vancomycin 1,000 mg Intravenous Q24H   vancomycin 20 mg/kg Intravenous Once       Allergies:  Allergies   Allergen Reactions   • Buffered Aspirin Rash and Shortness Of Breath   • Ace Inhibitors      cough   • Aurothioglucose Diarrhea     Increased AST & ALT   • Latex Itching   • Keflex [Cephalexin] Hives and Rash       Review of Systems  Pertinent items are noted in HPI, all other systems reviewed and negative    Objective     Vital Signs  Temp:  [99.1 °F (37.3 °C)-101.8 °F (38.8 °C)] 100.5 °F (38.1 °C)  Heart Rate:  [107-132] 125  Resp:  [16-18] 16  BP: (119-153)/() 152/94    Physical Exam:   This is a toxic appearing -American female who at the time of my visit had a pulse in the 1:30 range and was febrile.   Lids and conjunctivae appear pale.  PERRLA.   Head appears atraumatic.  Her hearing appears to be adequate.   Neck is supple.  She has no gross thyromegaly.   Chest is clear.  She is in no respiratory distress.   She has tachycardia.  I do not hear a gallop.   Abdominal examination demonstrates some mild tenderness without hepatosplenomegaly or mass.   Rectal examination demonstrates no mass.  There is a small amount of dark watery liquid, not grossly melenic.   No palpable adenopathy of the neck or groin.   Extremity and skin examination demonstrates 4+ edema, greater on the left than the right.  There is evidence of cellulitis on the left pretibial area and there is a 1 x 2 cm blistered area which patient and family says is new.   She is pleasant and cooperative.  She has trouble choosing her words at times and I think her memory is not good for recent events.    Results Review:   I reviewed the patient's new clinical results.    WBC No results found for: WBCS   HGB Hemoglobin   Date  Value Ref Range Status   01/21/2018 6.6 (C) 11.9 - 15.5 g/dL Final   01/20/2018 7.1 (L) 11.9 - 15.5 g/dL Final   01/20/2018 7.0 (L) 11.9 - 15.5 g/dL Final   01/19/2018 8.1 (L) 11.9 - 15.5 g/dL Final      HCT Hematocrit   Date Value Ref Range Status   01/21/2018 21.9 (L) 35.6 - 45.5 % Final   01/20/2018 24.0 (L) 35.6 - 45.5 % Final   01/20/2018 22.9 (L) 35.6 - 45.5 % Final   01/19/2018 26.9 (L) 35.6 - 45.5 % Final      Platlets No results found for: LABPLAT   MCV MCV   Date Value Ref Range Status   01/21/2018 69.1 (L) 80.5 - 98.2 fL Final   01/20/2018 69.2 (L) 80.5 - 98.2 fL Final   01/19/2018 69.9 (L) 80.5 - 98.2 fL Final          Sodium Sodium   Date Value Ref Range Status   01/21/2018 135 (L) 136 - 145 mmol/L Final   01/20/2018 135 (L) 136 - 145 mmol/L Final   01/19/2018 127 (L) 136 - 145 mmol/L Final      Potassium Potassium   Date Value Ref Range Status   01/21/2018 4.1 3.5 - 5.2 mmol/L Final   01/20/2018 4.7 3.5 - 5.2 mmol/L Final   01/19/2018 6.0 (H) 3.5 - 5.2 mmol/L Final      Chloride Chloride   Date Value Ref Range Status   01/21/2018 97 (L) 98 - 107 mmol/L Final   01/20/2018 101 98 - 107 mmol/L Final   01/19/2018 92 (L) 98 - 107 mmol/L Final      CO2 CO2   Date Value Ref Range Status   01/21/2018 25.4 22.0 - 29.0 mmol/L Final   01/20/2018 21.6 (L) 22.0 - 29.0 mmol/L Final   01/19/2018 22.8 22.0 - 29.0 mmol/L Final      BUN BUN   Date Value Ref Range Status   01/21/2018 34 (H) 8 - 23 mg/dL Final   01/20/2018 48 (H) 8 - 23 mg/dL Final   01/19/2018 59 (H) 8 - 23 mg/dL Final      Creatinine Creatinine   Date Value Ref Range Status   01/21/2018 1.14 (H) 0.57 - 1.00 mg/dL Final   01/20/2018 1.24 (H) 0.57 - 1.00 mg/dL Final   01/19/2018 1.70 (H) 0.57 - 1.00 mg/dL Final      Calcium Calcium   Date Value Ref Range Status   01/21/2018 7.7 (L) 8.6 - 10.5 mg/dL Final   01/20/2018 8.0 (L) 8.6 - 10.5 mg/dL Final   01/19/2018 9.0 8.6 - 10.5 mg/dL Final      Albumin Albumin   Date Value Ref Range Status   01/21/2018 1.90  (L) 3.50 - 5.20 g/dL Final   01/20/2018 2.00 (L) 3.50 - 5.20 g/dL Final   01/19/2018 2.60 (L) 3.50 - 5.20 g/dL Final      AST  ALT  PT/INR:   AST (SGOT)   Date Value Ref Range Status   01/21/2018 57 (H) 1 - 32 U/L Final   01/19/2018 78 (H) 1 - 32 U/L Final     ALT (SGPT)   Date Value Ref Range Status   01/21/2018 20 1 - 33 U/L Final   01/19/2018 26 1 - 33 U/L Final     No results found for: PROTIME/No results found for: INR         Imaging Results (last 72 hours)     Procedure Component Value Units Date/Time    US Renal Bilateral [[id number]] Collected:  01/20/18 0823     Updated:  01/20/18 0827    Narrative:       US RENAL BILATERAL-     INDICATIONS: Acute kidney injury     TECHNIQUE: ULTRASOUND OF THE KIDNEYS AND URINARY BLADDER.     COMPARISON: None available     FINDINGS:     The right kidney measures 10.0 centimeters, the left kidney measures 9.6  centimeters.     No renal lesion is identified. A few echogenic foci are apparent in the  right kidney, as large as 3 mm, suggesting small nonobstructive stones.  No hydronephrosis or echogenic left nephrolithiasis.     No ureteral jets were observed during the exam. The urinary bladder  otherwise appears unremarkable.       Impression:       No hydronephrosis.     This report was finalized on 1/20/2018 8:24 AM by Dr. Mt Torres MD.       CT Abdomen Pelvis With Contrast [[id number]] Collected:  01/20/18 1649     Updated:  01/20/18 1837    Narrative:       CT CHEST ABDOMEN AND PELVIS WITH CONTRAST     HISTORY: Acute renal failure, hyponatremia, malaise, weakness.     TECHNIQUE: CT chest, abdomen and pelvis with IV and oral contrast.     COMPARISON: There are no previous CTs for comparison.     FINDINGS  CHEST: There are small mediastinal nodes without evidence for  mediastinal or hilar ramya enlargement. Shotty bilateral axillary nodes  are present. There is a 1.1 cm right axillary node. There is also a  mildly enlarged left subpectoral node measuring 1.5 cm.      Small pleural effusions layer dependently and there is left and right  lower lobe atelectasis. Within the lingula and anterior/inferior lateral  left upper lobe there is peripheral airspace disease and ground glass  opacity that is consistent with infiltrate in the proper clinical  setting. Right lung appears clear with the exception of right basilar  atelectasis. Heart size is enlarged. There is advanced bilateral  shoulder osteoarthritis. Mild scoliotic curvature is present of the  thoracic spine.     ABDOMEN/PELVIS:  There is diffuse fat infiltration of the liver. Dense  material layers dependently within the gallbladder fundus due to sludge  or small stones or vicarious excretion of contrast. Splenic size is  within normal limits. Adrenal glands, pancreas, and right kidney appear  within normal limits. There is a 9 mm left mid to lower pole low-density  renal lesion that is difficult to characterize due to its small size,  though it is most likely a cyst. There are multiple retroperitoneal  lymph nodes with borderline enlargement. A left periaortic node measures  1.2 cm. There is no bowel dilatation or evidence for bowel obstruction.  There is no evidence for appendicitis. Oral contrast extends to the  rectum.  There is no bowel dilatation or evidence for obstruction. There  are bilateral inguinal nodes which are borderline enlarged. A right  inguinal node measures 1.6 x 0.9 cm.       Impression:       1. Small bilateral pleural effusions with lower lobe atelectasis. Within  the lingula and anterior inferior lateral left upper lobe there is  peripheral area of consolidation and ground glass opacity consistent  with infiltrate in the proper clinical setting.  2. Cardiomegaly.  3. Borderline enlarged nodes within the right axilla, left subpectoral  region, retroperitoneum, right inguinal region. These may represent  reactive nodes though are indeterminate with this exam. There are no  previous studies for  comparison.  4. Dense material within the gallbladder fundus due to sludge, vicarious  excretion of contrast, or small stones.  5. Sigmoid diverticulosis without evidence for diverticulitis.  6. Advanced bilateral shoulder osteoarthritis.      Radiation dose reduction techniques were utilized, including automated  exposure control and exposure modulation based on body size.     This report was finalized on 1/20/2018 6:34 PM by Dr. Blake Graham MD.       CT Chest With Contrast [[id number]] Collected:  01/20/18 1649     Updated:  01/20/18 1837    Narrative:       CT CHEST ABDOMEN AND PELVIS WITH CONTRAST     HISTORY: Acute renal failure, hyponatremia, malaise, weakness.     TECHNIQUE: CT chest, abdomen and pelvis with IV and oral contrast.     COMPARISON: There are no previous CTs for comparison.     FINDINGS  CHEST: There are small mediastinal nodes without evidence for  mediastinal or hilar ramya enlargement. Shotty bilateral axillary nodes  are present. There is a 1.1 cm right axillary node. There is also a  mildly enlarged left subpectoral node measuring 1.5 cm.     Small pleural effusions layer dependently and there is left and right  lower lobe atelectasis. Within the lingula and anterior/inferior lateral  left upper lobe there is peripheral airspace disease and ground glass  opacity that is consistent with infiltrate in the proper clinical  setting. Right lung appears clear with the exception of right basilar  atelectasis. Heart size is enlarged. There is advanced bilateral  shoulder osteoarthritis. Mild scoliotic curvature is present of the  thoracic spine.     ABDOMEN/PELVIS:  There is diffuse fat infiltration of the liver. Dense  material layers dependently within the gallbladder fundus due to sludge  or small stones or vicarious excretion of contrast. Splenic size is  within normal limits. Adrenal glands, pancreas, and right kidney appear  within normal limits. There is a 9 mm left mid to lower pole  low-density  renal lesion that is difficult to characterize due to its small size,  though it is most likely a cyst. There are multiple retroperitoneal  lymph nodes with borderline enlargement. A left periaortic node measures  1.2 cm. There is no bowel dilatation or evidence for bowel obstruction.  There is no evidence for appendicitis. Oral contrast extends to the  rectum.  There is no bowel dilatation or evidence for obstruction. There  are bilateral inguinal nodes which are borderline enlarged. A right  inguinal node measures 1.6 x 0.9 cm.       Impression:       1. Small bilateral pleural effusions with lower lobe atelectasis. Within  the lingula and anterior inferior lateral left upper lobe there is  peripheral area of consolidation and ground glass opacity consistent  with infiltrate in the proper clinical setting.  2. Cardiomegaly.  3. Borderline enlarged nodes within the right axilla, left subpectoral  region, retroperitoneum, right inguinal region. These may represent  reactive nodes though are indeterminate with this exam. There are no  previous studies for comparison.  4. Dense material within the gallbladder fundus due to sludge, vicarious  excretion of contrast, or small stones.  5. Sigmoid diverticulosis without evidence for diverticulitis.  6. Advanced bilateral shoulder osteoarthritis.      Radiation dose reduction techniques were utilized, including automated  exposure control and exposure modulation based on body size.     This report was finalized on 1/20/2018 6:34 PM by Dr. Blake Graham MD.       XR Ankle 2 View Bilateral [[id number]] Collected:  01/20/18 1514     Updated:  01/20/18 1837    Narrative:       BOTH ANKLES: AP AND LATERAL VIEWS OF EACH     HISTORY: Pain and swelling for 2 weeks in both ankles.     COMPARISON: None.     RIGHT ANKLE: There is advanced tibiotalar and subtalar arthritis with  joint space loss and exuberant marginal osteophyte formation. Chronic  osteochondral bodies  project anterior to the tibiotalar joint and extend  above the talar head where there is spur formation. Generalized soft  tissue swelling is present about the ankle and there are vascular  calcifications. Moderate-to-severe osteoarthritis is present of the  talonavicular joint. There is no evidence for fracture.     LEFT ANKLE: Tibiotalar joint space appears within normal limits. There  are arthritic changes of the subtalar joints and at the talonavicular  navicular cuneiform joints, where there is joint space narrowing and  spur formation. Generalized soft tissue swelling is present about the  ankle. There is no evidence for fracture or acute osseous abnormality.  Atherosclerotic calcifications are evident.     This report was finalized on 1/20/2018 6:34 PM by Dr. Blake Graham MD.       IR Lumbar Puncture Diagnosis [[id number]] Collected:  01/21/18 1625     Updated:  01/21/18 1631    Narrative:       IR LUMBAR PUNCTURE DIAGNOSIS-     INDICATIONS: Fever     FINDINGS:     Following detailed discussion with the patient of the risks, benefits,  alternatives of the procedure, verbal and written informed consent was  obtained from the patient's son, this patient was unable to provide  consent.     Patient was placed in a prone position on the fluoroscopy table. A final  timeout was performed verifying patient identity and procedure.     A location was chosen at the L3/4 level, the overlying skin was cleaned  and anesthetized, and a 20-gauge needle was advanced into the thecal  sac, revealing clear CSF. The fluid was extremely slow-flowing despite  repositioning the needle and the patient, and only approximately 2 cc  could be obtained. Opening pressure could not be assessed. The fluid was  sent to the laboratory for further evaluation. Needle was removed  intact.     Fluoroscopy time: 20 seconds, 4 images       Impression:          Lumbar puncture, as described. Collected fluid was sent to the  laboratory for  further evaluation.      This report was finalized on 1/21/2018 4:28 PM by Dr. Mt Torres MD.             Assessment/Plan     Principal Problem:    FUO (fever of unknown origin)  Active Problems:    Hyperkalemia    Hyponatremia    JEFFERY (acute kidney injury)    Anemia    GI bleed      This is a toxic appearing woman who at the very least has cellulitis of the lower extremities, particularly of the left.  She has severe anemia.  Reviewing the records through the [employer] system, it looks like over the past several months her baseline hemoglobin has been around 9.  There evidently have been plans for her to undergo colonoscopy but because of her active medical issues they have not had the opportunity to perform this.    She has received 1 unit of blood already and she will have her blood counts rechecked with additional transfusion as needed.  I would keep in mind the possibility of an underlying hemolytic process, not just GI bleeding.    Otherwise the etiology of all of this is unclear.  We will follow along and help in any way I can.    I discussed the patients findings and my recommendations with patient, family and nursing staff    Kpi Lizarraga MD  [facility]  01/21/18  6:19 PM

## 2018-01-21 NOTE — PROGRESS NOTES
"DAILY PROGRESS NOTE  [facility]    Patient Identification:  Name: Domitila Valera  Age: 62 y.o.  Sex: female  :  1955  MRN: [id number]         Primary Care Physician: Javier Soriano III, MD    Subjective:  Interval History:She complains of pain in feet.  Nurse reports patient had dark BM and hgb lower.    Objective:    Scheduled Meds:    amLODIPine 5 mg Oral Q24H   cycloSPORINE 1 drop Both Eyes BID   enoxaparin 30 mg Subcutaneous Q24H   estradiol 1 mg Oral Daily   Ferrex 150 forte plus 1 capsule Oral Daily With Breakfast   ferrous sulfate 325 mg Oral BID With Meals   gabapentin 200 mg Oral Q12H     Continuous Infusions:    custom IV infusion builder  Last Rate: 100 mL/hr at 18 0719       Vital signs in last 24 hours:  Temp:  [99.1 °F (37.3 °C)-101.8 °F (38.8 °C)] 99.2 °F (37.3 °C)  Heart Rate:  [107-132] 132  Resp:  [17-20] 18  BP: (119-153)/() 153/79    Intake/Output:    Intake/Output Summary (Last 24 hours) at 18 1317  Last data filed at 18 1249   Gross per 24 hour   Intake             3379 ml   Output              500 ml   Net             2879 ml       Exam:  /79 (BP Location: Right arm, Patient Position: Sitting)  Pulse (!) 132  Temp 99.2 °F (37.3 °C) (Oral)   Resp 18  Ht 149.9 cm (59\")  Wt 63.3 kg (139 lb 8 oz)  SpO2 93%  BMI 28.18 kg/m2    General Appearance:    Alert, cooperative, no distress   Head:    Normocephalic, without obvious abnormality, atraumatic   Eyes:       Throat:   Lips, tongue, gums normal   Neck:   Supple, symmetrical, trachea midline, no JVD   Lungs:     Clear to auscultation bilaterally, respirations unlabored   Chest Wall:    No tenderness or deformity    Heart:    Regular rate and rhythm, S1 and S2 normal, no murmur,no  Rub or gallop   Abdomen:     Soft, non-tender, bowel sounds active, no masses, no organomegaly    Extremities:   Extremities normal, atraumatic, no cyanosis or edema, cellulitis in lower extremity.   Pulses:    "   Skin:   Skin is warm and dry,  no rashes or palpable lesions   Neurologic:   no focal deficits noted      [unfilled]  Data Review:    Results from last 7 days  Lab Units 01/21/18  0541 01/20/18  0445 01/19/18  1457   SODIUM mmol/L 135* 135* 127*   POTASSIUM mmol/L 4.1 4.7 6.0*   CHLORIDE mmol/L 97* 101 92*   CO2 mmol/L 25.4 21.6* 22.8   BUN mg/dL 34* 48* 59*   CREATININE mg/dL 1.14* 1.24* 1.70*   GLUCOSE mg/dL 80 69 92   CALCIUM mg/dL 7.7* 8.0* 9.0       Results from last 7 days  Lab Units 01/21/18  0541 01/20/18  1725 01/20/18  0602 01/19/18  1457   WBC 10*3/mm3 5.23  --  4.88 6.07   HEMOGLOBIN g/dL 6.6* 7.1* 7.0* 8.1*   HEMATOCRIT % 21.9* 24.0* 22.9* 26.9*   PLATELETS 10*3/mm3 121*  --  126* 150             No results found for: TROPONINT        Results from last 7 days  Lab Units 01/21/18  0541 01/19/18  1457   ALK PHOS U/L 392* 543*   BILIRUBIN mg/dL 0.4 0.5   ALT (SGPT) U/L 20 26   AST (SGOT) U/L 57* 78*             No results found for: POCGLU        Patient Active Problem List   Diagnosis Code   • FUO (fever of unknown origin) R50.9   • Hyperkalemia E87.5   • Benign essential HTN I10   • Cellulitis of right upper arm L03.113   • Hyponatremia E87.1   • JEFFERY (acute kidney injury) N17.9   • Anemia D64.9   • GI bleed K92.2       Assessment:  Active Hospital Problems (** Indicates Principal Problem)    Diagnosis Date Noted   • **FUO (fever of unknown origin) [R50.9] 01/19/2018   • GI bleed [K92.2] 01/21/2018   • Anemia [D64.9] 01/20/2018   • Hyperkalemia [E87.5] 01/19/2018   • Hyponatremia [E87.1] 01/19/2018   • JEFFERY (acute kidney injury) [N17.9] 01/19/2018      Resolved Hospital Problems    Diagnosis Date Noted Date Resolved   No resolved problems to display.       Plan:  ID consult noted.  Work up in progress  plans and antibiotics per  ID.   Transfusion today. Ask GI to see for GI bleeding.  Protonix IV    Elie Morales MD  1/21/2018  1:17 PM

## 2018-01-21 NOTE — PROGRESS NOTES
LOS: 2 days     Chief Complaint:  Follow-up fever    History from pt and RN Bertha.    Interval History:  Still w/ fever this AM. She is having some confusion but she denies headache and neck stiffness. She deniesd abdominal pain. She still has BLE edema with sharp pain upon palpation relieved w/ rest.     ROS: no n/v/d    Vital Signs  Temp:  [99.1 °F (37.3 °C)-101.8 °F (38.8 °C)] 101.5 °F (38.6 °C)  Heart Rate:  [107-119] 119  Resp:  [18-20] 18  BP: (133-151)/() 135/80    Physical Exam:  General: awake, chronically ill appearing   Head: Normocephalic, atraumatic  Eyes: PERRL, EOMI, no scleral icterus, no conjunctival pallor, no conjunctival hemorrhages.   ENT: MM dry, cracked lips, OP clear, no thrush. Fair dentition.   Neck: Supple, no visible thyromegaly  Cardiovascular: tachycardic, RR, no murmurs, rubs, or gallops; 1+ pitting LE edema  Respiratory: Lungs are clear to ascultation bilaterally, no rales or wheezing; normal work of breathing on ambient air  GI: Abdomen is soft, non-tender, non-distended, normal bowel sounds in all four quadrants; no hepatosplenomegaly, no masses palpated  : no Pineda catheter present  Musculoskeletal: B ankle tenderness and swelling  Skin: thin skin on shins, no significant erythema  Neurological: Alert and oriented x 3, cranial nerves 2-12 grossly intact, motor strength 5/5 in all four extremities  Psychiatric: Normal mood and affect   Lymph: no pre-auricular, post-auricular, submandibular, cervical, supraclavicular  LAD  Vasc: no cyanosis; PIV w/o erythema    Antibiotics:    Current Facility-Administered Medications:   •  acetaminophen (TYLENOL) tablet 650 mg, 650 mg, Oral, Q6H PRN, Hugo Elam MD, 650 mg at 01/21/18 0818  •  cycloSPORINE (RESTASIS) 0.05 % ophthalmic emulsion 1 drop, 1 drop, Both Eyes, BID, Hugo Elam MD, 1 drop at 01/21/18 0819  •  dextrose (D50W) solution 50 mL, 50 mL, Intravenous, Q1H PRN, Vinayak Feliciano MD, 50 mL at 01/20/18 0003  •   enoxaparin (LOVENOX) syringe 30 mg, 30 mg, Subcutaneous, Q24H, Hugo Elam MD, 30 mg at 01/20/18 2130  •  estradiol (ESTRACE) tablet 1 mg, 1 mg, Oral, Daily, Hugo Elam MD, 1 mg at 01/21/18 0819  •  Ferrex 150 forte plus capsule 1 capsule, 1 capsule, Oral, Daily With Breakfast, Hugo Elam MD, 1 capsule at 01/20/18 1013  •  ferrous sulfate tablet 325 mg, 325 mg, Oral, BID With Meals, Hugo Elam MD, 325 mg at 01/20/18 1001  •  gabapentin (NEURONTIN) capsule 200 mg, 200 mg, Oral, Q12H, Hugo Elam MD  •  HYDROcodone-acetaminophen (NORCO) 5-325 MG per tablet 1 tablet, 1 tablet, Oral, Q6H PRN, Hugo Elam MD, 1 tablet at 01/21/18 0818  •  sodium chloride 0.45 % 950 mL with sodium bicarbonate 8.4 % 50 mEq infusion, , Intravenous, Continuous, Vinayak Feliciano MD, Last Rate: 100 mL/hr at 01/21/18 0719  •  temazepam (RESTORIL) capsule 15 mg, 15 mg, Oral, Nightly PRN, Hugo Elam MD, 15 mg at 01/20/18 2131    LABS:    Lab Results   Component Value Date    WBC 5.23 01/21/2018    HGB 6.6 (C) 01/21/2018    HCT 21.9 (L) 01/21/2018    MCV 69.1 (L) 01/21/2018     (L) 01/21/2018     Lab Results   Component Value Date    GLUCOSE 80 01/21/2018    BUN 34 (H) 01/21/2018    CREATININE 1.14 (H) 01/21/2018    EGFRIFAFRI 58 (L) 01/21/2018    BCR 29.8 (H) 01/21/2018    CO2 25.4 01/21/2018    CALCIUM 7.7 (L) 01/21/2018    ALBUMIN 1.90 (L) 01/21/2018    LABIL2 0.4 01/21/2018    AST 57 (H) 01/21/2018    ALT 20 01/21/2018    CRP 10.72 (H) 01/20/2018     Cortisol 7  Procal 1.0  Urine Histo Ag pending  HIV negative  Hep C pending  RPR pending    Microbiology:  1/19 BCx: NGTD  1/20 RVP: negative    Radiology (personally reviewed):   CT CAP with mild L lingular infiltrate; diffuse low-grade lymphadenopathy in mediastinum, abdomen, and pelvis    Assessment/Plan   1. Fever of unknown origin  2. Toxic metabolic encephalopathy  3. B ankle pain and swelling  4. Rheumatoid arthritis  5.  History of ischemic colitis    CT with mild L lingular infiltrate but she is not coughing, short of air, or requiring supplemental O2. Today she has more confusion but no menigismus. I am going to order an LP. After the LP is done, I'll start her on empiric vancomycin, cefepime, and acyclovir. Check an RPR. Ankles are still painful but XR reassuring. Lymphadenopathy on scan noted. Will check an LDH and blood for flow cytometry.    Thank you for this consult. ID will follow. I have discussed this case with Ryley Stone.

## 2018-01-21 NOTE — PROGRESS NOTES
"Pharmacokinetic Consult - Vancomycin Dosing     Domitila Valera is on day #1  pharmacy to dose vancomycin for PNA .  Pharmacy dosing vancomycin per Dr Morales's request.   Goal trough: 15-20  mg/L        ID has been consulted   Relevant clinical data and objective history reviewed:  62 y.o. female 149.9 cm (59\") 63.3 kg (139 lb 8 oz)    Past Medical History:   Diagnosis Date   • Hypertension    • Ischemic colitis    • Rheumatoid arthritis    • Thyroid nodule      Creatinine   Date Value Ref Range Status   01/21/2018 1.14 (H) 0.57 - 1.00 mg/dL Final   01/20/2018 1.24 (H) 0.57 - 1.00 mg/dL Final   01/19/2018 1.70 (H) 0.57 - 1.00 mg/dL Final     BUN   Date Value Ref Range Status   01/21/2018 34 (H) 8 - 23 mg/dL Final     Estimated Creatinine Clearance: 41.9 mL/min (by C-G formula based on Cr of 1.14).    Lab Results   Component Value Date    WBC 5.23 01/21/2018     Temp Readings from Last 3 Encounters:   01/21/18 100.5 °F (38.1 °C) (Oral)   11/15/13 100 °F (37.8 °C)   08/28/13 97.7 °F (36.5 °C) (Oral)     Baseline culture/source/susceptibility: 1/20 Resp Panel : normal                                                                1/19  BC x2  NG x 24 hours    Anti-Infectives     Ordered     Dose/Rate Route Frequency Start Stop    01/21/18 1802  vancomycin (VANCOCIN) in iso-osmotic dextrose IVPB 1 g (premix) 200 mL     Ordering Provider:  Elie Morales MD    1,000 mg  over 100 Minutes Intravenous Every 24 Hours 01/22/18 1300 01/29/18 1259    01/21/18 1708  vancomycin 1250 mg/250 mL 0.9% NS IVPB ([facility])     Ordering Provider:  Elie Moralse MD    20 mg/kg × 57.6 kg  over 125 Minutes Intravenous Once 01/21/18 1800      01/21/18 1653  ceFEPime (MAXIPIME) in SWFI 2g/10ml IV PUSH syringe     Ordering Provider:  Dylon Andrews MD    2 g  over 5 Minutes Intravenous Every 12 Hours 01/21/18 1730 01/28/18 1729    01/21/18 1653  Pharmacy to dose vancomycin     Ordering Provider:  Dylon Andrews MD     Does not " apply Continuous PRN 01/21/18 1647 01/28/18 1646 01/19/18 2036  vancomycin 1250 mg/250 mL 0.9% NS IVPB ([facility])     Ordering Provider:  Diego Panda MD    20 mg/kg × 57.6 kg Intravenous Once 01/19/18 2038 01/19/18 2203 01/19/18 2036  meropenem (MERREM) in SWFI 1 GRAM/20ml IV PUSH syringe     Ordering Provider:  Diego Panda MD    1 g  over 5 Minutes Intravenous Once 01/19/18 2038 01/19/18 2117          Vancomycin Dosing Schedule:  1/19 2203   Vancomycin 1250mg ( 19.7mg/kg) x1 dose  1/21~ 1800   Vancomycin 1250mg ( 19.7mg/kg) x1 dose load  1/22~ 1300   Vancomycin 1000mg ( 15.8mg/kg) q 24 hours  1/24~ 1230   Vancomycin trough prior to 4th overall dose     Assessment/Plan  1. See above for dosing schedule   2. Monitor renal function…CMP in am  3. Encourage hydration to prevent toxic accumulation   4. Monitor for s/sx of toxicity including increase in SCr and dec in UOP  5. Pharmacy will continue to follow and adjust accordingly    Jojo Lopez [facility]

## 2018-01-21 NOTE — PLAN OF CARE
Problem: Patient Care Overview (Adult)  Goal: Plan of Care Review  Outcome: Ongoing (interventions implemented as appropriate)   01/21/18 1552   Coping/Psychosocial Response Interventions   Plan Of Care Reviewed With patient   Patient Care Overview   Progress unable to show any progress toward functional goals   Outcome Evaluation   Outcome Summary/Follow up Plan Febrile most of the day, lowest 99.2 and highest 101.5. Medicating for pain and on occasion a dose of Tylenol. Stools appear as coffee grounds, dark brown to black and occur at every urination. No sign of red blood in stool. 1 unit PRBC given. LP being done this afternoon. Will continue to monitor.        Problem: Infection, Risk/Actual (Adult)  Goal: Identify Related Risk Factors and Signs and Symptoms  Outcome: Ongoing (interventions implemented as appropriate)   01/21/18 1552   Infection, Risk/Actual   Infection, Risk/Actual: Related Risk Factors age extremes;blood disorder;treatment plan   Signs and Symptoms (Infection, Risk/Actual) body temperature changes;heart rate increase;malaise;pain;respiratory rate increase;weakness     Goal: Infection Prevention/Resolution  Outcome: Ongoing (interventions implemented as appropriate)   01/21/18 1552   Infection, Risk/Actual (Adult)   Infection Prevention/Resolution making progress toward outcome       Problem: Fall Risk (Adult)  Goal: Identify Related Risk Factors and Signs and Symptoms  Outcome: Ongoing (interventions implemented as appropriate)   01/21/18 1552   Fall Risk   Fall Risk: Related Risk Factors neuro disease/injury;depression/anxiety;age-related changes;homeostatic imbalance     Goal: Absence of Falls  Outcome: Ongoing (interventions implemented as appropriate)   01/21/18 1552   Fall Risk (Adult)   Absence of Falls making progress toward outcome       Problem: Pain, Acute (Adult)  Goal: Identify Related Risk Factors and Signs and Symptoms  Outcome: Ongoing (interventions implemented as  appropriate)   01/21/18 1552   Pain, Acute   Related Risk Factors (Acute Pain) disease process   Signs and Symptoms (Acute Pain) constipation/diarrhea;fatigue/weakness;moaning;anorexia     Goal: Acceptable Pain Control/Comfort Level  Outcome: Ongoing (interventions implemented as appropriate)   01/21/18 1552   Pain, Acute (Adult)   Acceptable Pain Control/Comfort Level making progress toward outcome       Problem: Pressure Ulcer Risk (Checo Scale) (Adult,Obstetrics,Pediatric)  Goal: Identify Related Risk Factors and Signs and Symptoms  Outcome: Ongoing (interventions implemented as appropriate)   01/21/18 1552   Pressure Ulcer Risk (Checo Scale)   Related Risk Factors (Pressure Ulcer Risk (Checo Scale)) age extremes;fluid intake inadequate

## 2018-01-21 NOTE — PLAN OF CARE
Problem: Patient Care Overview (Adult)  Goal: Plan of Care Review  Outcome: Ongoing (interventions implemented as appropriate)   01/21/18 0442   Coping/Psychosocial Response Interventions   Plan Of Care Reviewed With patient   Patient Care Overview   Progress no change   Outcome Evaluation   Outcome Summary/Follow up Plan Febrile, tachy low 100's, blood cx pending, prn pain given twice during shift for leg/foot pain, tylenol once for fever, one episode of urne incontinence, 1 soft BM, Labs ordered for am will monitor       Problem: Infection, Risk/Actual (Adult)  Goal: Identify Related Risk Factors and Signs and Symptoms  Outcome: Ongoing (interventions implemented as appropriate)      Problem: Fall Risk (Adult)  Goal: Identify Related Risk Factors and Signs and Symptoms  Outcome: Ongoing (interventions implemented as appropriate)      Problem: Pain, Acute (Adult)  Goal: Identify Related Risk Factors and Signs and Symptoms  Outcome: Ongoing (interventions implemented as appropriate)      Problem: Pressure Ulcer Risk (Checo Scale) (Adult,Obstetrics,Pediatric)  Goal: Identify Related Risk Factors and Signs and Symptoms  Outcome: Ongoing (interventions implemented as appropriate)    Goal: Skin Integrity  Outcome: Ongoing (interventions implemented as appropriate)

## 2018-01-21 NOTE — PROGRESS NOTES
"   LOS: 2 days   Patient Care Team:  Javier Soriano III, MD as PCP - General (Internal Medicine)    Chief Complaint/ Reason for encounter: Acute renal failure, hyponatremia        Subjective     Fever    Pertinent negatives include no chest pain or nausea.   Foot Pain   Associated symptoms include a fever and weakness. Pertinent negatives include no chest pain or nausea.       Subjective:  Symptoms:  Stable.  She reports weakness.  No shortness of breath or chest pain.  (Remains lethargic  Seen in IR, LP pending).    Diet:  Poor intake.  No nausea.    Activity level: Impaired due to weakness.    Pain:  She reports no pain.          History taken from: Patient and chart    Objective     Vital Signs  Temp:  [99.1 °F (37.3 °C)-101.8 °F (38.8 °C)] 100.5 °F (38.1 °C)  Heart Rate:  [107-132] 118  Resp:  [17-20] 18  BP: (119-153)/() 140/88       Wt Readings from Last 1 Encounters:   01/21/18 0737 63.3 kg (139 lb 8 oz)   01/20/18 0546 63.1 kg (139 lb 1.6 oz)   01/19/18 2243 61.6 kg (135 lb 14.4 oz)   01/19/18 1351 57.6 kg (127 lb)       Objective:  General Appearance:  Comfortable, ill-appearing, in no acute distress and not in pain (Thin, mildly cachectic).    Vital signs: (most recent): Blood pressure 140/88, pulse 118, temperature 100.5 °F (38.1 °C), temperature source Oral, resp. rate 18, height 149.9 cm (59\"), weight 63.3 kg (139 lb 8 oz), SpO2 95 %.  Vital signs are normal.  Fever.    Output: Producing urine.    HEENT: Normal HEENT exam.    Lungs:  Normal respiratory rate and normal effort.  She is not in respiratory distress.  Breath sounds clear to auscultation.  There are decreased breath sounds.  No wheezes or rales.    Heart: Normal rate.  Regular rhythm.    Abdomen: Abdomen is soft and non-distended.  Bowel sounds are normal.   There is no abdominal tenderness.  There is no epigastric area or no suprapubic area tenderness.  There is no rebound tenderness.   There is no mass.   Extremities: Normal range of " motion.  There is dependent edema.  There is no deformity.    Pulses: Distal pulses are intact.    Neurological: Patient is alert.    Skin:  Warm and dry.  There is a rash.  No ecchymosis or ulceration.             Results Review:    Past Medical History: reviewed and updated  Past Medical History:   Diagnosis Date   • Hypertension    • Ischemic colitis    • Rheumatoid arthritis    • Thyroid nodule          Allergies:  Allergies   Allergen Reactions   • Buffered Aspirin Rash and Shortness Of Breath   • Ace Inhibitors      cough   • Aurothioglucose Diarrhea     Increased AST & ALT   • Latex Itching   • Keflex [Cephalexin] Hives and Rash       Intake/Output:     Intake/Output Summary (Last 24 hours) at 01/21/18 1445  Last data filed at 01/21/18 1340   Gross per 24 hour   Intake             3379 ml   Output              500 ml   Net             2879 ml         DATA:  Interval chart, labs and notes reviewed.  The following labs personally reviewed by me.  Renal ultrasound personally reviewed, no obstruction  I personally reviewed her old records from [facility], baseline creatinine 1.4  Discussed with her son at bedside, interval history obtained from him as well regarding recent health and complaints    Labs:   Recent Results (from the past 24 hour(s))   Hemoglobin & Hematocrit, Blood    Collection Time: 01/20/18  5:25 PM   Result Value Ref Range    Hemoglobin 7.1 (L) 11.9 - 15.5 g/dL    Hematocrit 24.0 (L) 35.6 - 45.5 %   CBC (No Diff)    Collection Time: 01/21/18  5:41 AM   Result Value Ref Range    WBC 5.23 4.50 - 10.70 10*3/mm3    RBC 3.17 (L) 3.90 - 5.20 10*6/mm3    Hemoglobin 6.6 (C) 11.9 - 15.5 g/dL    Hematocrit 21.9 (L) 35.6 - 45.5 %    MCV 69.1 (L) 80.5 - 98.2 fL    MCH 20.8 (L) 26.9 - 32.0 pg    MCHC 30.1 (L) 32.4 - 36.3 g/dL    RDW 19.6 (H) 11.7 - 13.0 %    RDW-SD 47.8 37.0 - 54.0 fl    Platelets 121 (L) 140 - 500 10*3/mm3   Comprehensive Metabolic Panel    Collection Time: 01/21/18  5:41 AM   Result Value  Ref Range    Glucose 80 65 - 99 mg/dL    BUN 34 (H) 8 - 23 mg/dL    Creatinine 1.14 (H) 0.57 - 1.00 mg/dL    Sodium 135 (L) 136 - 145 mmol/L    Potassium 4.1 3.5 - 5.2 mmol/L    Chloride 97 (L) 98 - 107 mmol/L    CO2 25.4 22.0 - 29.0 mmol/L    Calcium 7.7 (L) 8.6 - 10.5 mg/dL    Total Protein 6.7 6.0 - 8.5 g/dL    Albumin 1.90 (L) 3.50 - 5.20 g/dL    ALT (SGPT) 20 1 - 33 U/L    AST (SGOT) 57 (H) 1 - 32 U/L    Alkaline Phosphatase 392 (H) 39 - 117 U/L    Total Bilirubin 0.4 0.1 - 1.2 mg/dL    eGFR  African Amer 58 (L) >60 mL/min/1.73    Globulin 4.8 gm/dL    A/G Ratio 0.4 g/dL    BUN/Creatinine Ratio 29.8 (H) 7.0 - 25.0    Anion Gap 12.6 mmol/L   Hepatitis C Antibody    Collection Time: 01/21/18  5:41 AM   Result Value Ref Range    Hepatitis C Ab Non-Reactive Non-Reactive   HIV-1 / O / 2 Ag / Antibody 4th Generation    Collection Time: 01/21/18  5:41 AM   Result Value Ref Range    HIV-1/ HIV-2 Non-Reactive Non-Reactive    HIV-1 P24 Ag Screen Non-Reactive Non-Reactive   Lactate Dehydrogenase    Collection Time: 01/21/18  5:41 AM   Result Value Ref Range     (H) 135 - 214 U/L   Type & Screen    Collection Time: 01/21/18  8:32 AM   Result Value Ref Range    ABO Type O     RH type Positive     Antibody Screen Negative    Prepare RBC, 1 Units    Collection Time: 01/21/18 10:39 AM   Result Value Ref Range    Product Code Z7522T25     Unit Number [id number]     UNIT  ABO O     UNIT  RH POS     Dispense Status IS     Blood Type OPOS     Blood Expiration Date [id number]     Blood Type Barcode 5100        Radiology:  Imaging Results (last 24 hours)     Procedure Component Value Units Date/Time    CT Abdomen Pelvis With Contrast [[id number]] Collected:  01/20/18 1649     Updated:  01/20/18 1837    Narrative:       CT CHEST ABDOMEN AND PELVIS WITH CONTRAST     HISTORY: Acute renal failure, hyponatremia, malaise, weakness.     TECHNIQUE: CT chest, abdomen and pelvis with IV and oral contrast.     COMPARISON: There  are no previous CTs for comparison.     FINDINGS  CHEST: There are small mediastinal nodes without evidence for  mediastinal or hilar ramya enlargement. Shotty bilateral axillary nodes  are present. There is a 1.1 cm right axillary node. There is also a  mildly enlarged left subpectoral node measuring 1.5 cm.     Small pleural effusions layer dependently and there is left and right  lower lobe atelectasis. Within the lingula and anterior/inferior lateral  left upper lobe there is peripheral airspace disease and ground glass  opacity that is consistent with infiltrate in the proper clinical  setting. Right lung appears clear with the exception of right basilar  atelectasis. Heart size is enlarged. There is advanced bilateral  shoulder osteoarthritis. Mild scoliotic curvature is present of the  thoracic spine.     ABDOMEN/PELVIS:  There is diffuse fat infiltration of the liver. Dense  material layers dependently within the gallbladder fundus due to sludge  or small stones or vicarious excretion of contrast. Splenic size is  within normal limits. Adrenal glands, pancreas, and right kidney appear  within normal limits. There is a 9 mm left mid to lower pole low-density  renal lesion that is difficult to characterize due to its small size,  though it is most likely a cyst. There are multiple retroperitoneal  lymph nodes with borderline enlargement. A left periaortic node measures  1.2 cm. There is no bowel dilatation or evidence for bowel obstruction.  There is no evidence for appendicitis. Oral contrast extends to the  rectum.  There is no bowel dilatation or evidence for obstruction. There  are bilateral inguinal nodes which are borderline enlarged. A right  inguinal node measures 1.6 x 0.9 cm.       Impression:       1. Small bilateral pleural effusions with lower lobe atelectasis. Within  the lingula and anterior inferior lateral left upper lobe there is  peripheral area of consolidation and ground glass opacity  consistent  with infiltrate in the proper clinical setting.  2. Cardiomegaly.  3. Borderline enlarged nodes within the right axilla, left subpectoral  region, retroperitoneum, right inguinal region. These may represent  reactive nodes though are indeterminate with this exam. There are no  previous studies for comparison.  4. Dense material within the gallbladder fundus due to sludge, vicarious  excretion of contrast, or small stones.  5. Sigmoid diverticulosis without evidence for diverticulitis.  6. Advanced bilateral shoulder osteoarthritis.      Radiation dose reduction techniques were utilized, including automated  exposure control and exposure modulation based on body size.     This report was finalized on 1/20/2018 6:34 PM by Dr. Blake Graham MD.       CT Chest With Contrast [[id number]] Collected:  01/20/18 1649     Updated:  01/20/18 1837    Narrative:       CT CHEST ABDOMEN AND PELVIS WITH CONTRAST     HISTORY: Acute renal failure, hyponatremia, malaise, weakness.     TECHNIQUE: CT chest, abdomen and pelvis with IV and oral contrast.     COMPARISON: There are no previous CTs for comparison.     FINDINGS  CHEST: There are small mediastinal nodes without evidence for  mediastinal or hilar ramya enlargement. Shotty bilateral axillary nodes  are present. There is a 1.1 cm right axillary node. There is also a  mildly enlarged left subpectoral node measuring 1.5 cm.     Small pleural effusions layer dependently and there is left and right  lower lobe atelectasis. Within the lingula and anterior/inferior lateral  left upper lobe there is peripheral airspace disease and ground glass  opacity that is consistent with infiltrate in the proper clinical  setting. Right lung appears clear with the exception of right basilar  atelectasis. Heart size is enlarged. There is advanced bilateral  shoulder osteoarthritis. Mild scoliotic curvature is present of the  thoracic spine.     ABDOMEN/PELVIS:  There is diffuse fat  infiltration of the liver. Dense  material layers dependently within the gallbladder fundus due to sludge  or small stones or vicarious excretion of contrast. Splenic size is  within normal limits. Adrenal glands, pancreas, and right kidney appear  within normal limits. There is a 9 mm left mid to lower pole low-density  renal lesion that is difficult to characterize due to its small size,  though it is most likely a cyst. There are multiple retroperitoneal  lymph nodes with borderline enlargement. A left periaortic node measures  1.2 cm. There is no bowel dilatation or evidence for bowel obstruction.  There is no evidence for appendicitis. Oral contrast extends to the  rectum.  There is no bowel dilatation or evidence for obstruction. There  are bilateral inguinal nodes which are borderline enlarged. A right  inguinal node measures 1.6 x 0.9 cm.       Impression:       1. Small bilateral pleural effusions with lower lobe atelectasis. Within  the lingula and anterior inferior lateral left upper lobe there is  peripheral area of consolidation and ground glass opacity consistent  with infiltrate in the proper clinical setting.  2. Cardiomegaly.  3. Borderline enlarged nodes within the right axilla, left subpectoral  region, retroperitoneum, right inguinal region. These may represent  reactive nodes though are indeterminate with this exam. There are no  previous studies for comparison.  4. Dense material within the gallbladder fundus due to sludge, vicarious  excretion of contrast, or small stones.  5. Sigmoid diverticulosis without evidence for diverticulitis.  6. Advanced bilateral shoulder osteoarthritis.      Radiation dose reduction techniques were utilized, including automated  exposure control and exposure modulation based on body size.     This report was finalized on 1/20/2018 6:34 PM by Dr. Blake Graham MD.       XR Ankle 2 View Bilateral [[id number]] Collected:  01/20/18 1514     Updated:  01/20/18 9735     Narrative:       BOTH ANKLES: AP AND LATERAL VIEWS OF EACH     HISTORY: Pain and swelling for 2 weeks in both ankles.     COMPARISON: None.     RIGHT ANKLE: There is advanced tibiotalar and subtalar arthritis with  joint space loss and exuberant marginal osteophyte formation. Chronic  osteochondral bodies project anterior to the tibiotalar joint and extend  above the talar head where there is spur formation. Generalized soft  tissue swelling is present about the ankle and there are vascular  calcifications. Moderate-to-severe osteoarthritis is present of the  talonavicular joint. There is no evidence for fracture.     LEFT ANKLE: Tibiotalar joint space appears within normal limits. There  are arthritic changes of the subtalar joints and at the talonavicular  navicular cuneiform joints, where there is joint space narrowing and  spur formation. Generalized soft tissue swelling is present about the  ankle. There is no evidence for fracture or acute osseous abnormality.  Atherosclerotic calcifications are evident.     This report was finalized on 1/20/2018 6:34 PM by Dr. Blake Graham MD.                Medications have been reviewed:  Current Facility-Administered Medications   Medication Dose Route Frequency Provider Last Rate Last Dose   • acetaminophen (TYLENOL) tablet 650 mg  650 mg Oral Q6H PRN Hugo Elam MD   650 mg at 01/21/18 0818   • amLODIPine (NORVASC) tablet 5 mg  5 mg Oral Q24H Elie Morales MD       • cycloSPORINE (RESTASIS) 0.05 % ophthalmic emulsion 1 drop  1 drop Both Eyes BID Hugo Elam MD   1 drop at 01/21/18 0819   • dextrose (D50W) solution 50 mL  50 mL Intravenous Q1H PRN Vinayak Feliciano MD   50 mL at 01/20/18 0003   • estradiol (ESTRACE) tablet 1 mg  1 mg Oral Daily Hugo Elam MD   1 mg at 01/21/18 0819   • Ferrex 150 forte plus capsule 1 capsule  1 capsule Oral Daily With Breakfast Hugo Elam MD   1 capsule at 01/20/18 1013   • ferrous sulfate tablet  325 mg  325 mg Oral BID With Meals Hugo Elam MD   325 mg at 01/20/18 1001   • gabapentin (NEURONTIN) capsule 200 mg  200 mg Oral Q12H Hugo Elam MD       • Hold medication  1 each Does not apply Continuous PRN Dylon Andrews MD       • Hold medication  1 each Does not apply Continuous PRN Dylon Andrews MD       • hydrALAZINE (APRESOLINE) injection 10 mg  10 mg Intravenous Q6H PRN Elie Morales MD       • HYDROcodone-acetaminophen (NORCO) 5-325 MG per tablet 1 tablet  1 tablet Oral Q6H PRN Hugo Elam MD   1 tablet at 01/21/18 1233   • pantoprazole (PROTONIX) injection 40 mg  40 mg Intravenous BID AC Elie Morales MD       • sodium chloride 0.45 % 950 mL with sodium bicarbonate 8.4 % 50 mEq infusion   Intravenous Continuous Vinayak Feliciano  mL/hr at 01/21/18 0719     • temazepam (RESTORIL) capsule 15 mg  15 mg Oral Nightly PRN Hugo Elam MD   15 mg at 01/20/18 2131       Assessment/Plan     Principal Problem:    FUO (fever of unknown origin)  Active Problems:    Hyperkalemia    Hyponatremia    JEFFERY (acute kidney injury)    Anemia    GI bleed      Assessment:  (Assesment  JEFFERY secondary to decreased intravascular volume and sespsis secondary to cellulitis , improved today    Chronic kidney disease, recent baseline creatinine around 1.4, near baseline today     HYperkalemia , improved     Hyponatremia , hypovolemic, better today with fluids     LE cellulitis     Metabolic acidosis, improved after bicarbonate drip     RA     HTN         Plan:  Renal function, hyponatremia and hyperkalemia all improved again today  Continue IV fluids, change to normal saline  Lumbar puncture today  Monitor vancomycin levels closely to avoid potential nephrotoxicity  Discussed with family at bedside).             Continue to monitor renal function, electroytes and volume closely   Please call me with any questions or concerns      Kip Roberson MD   [facility]   [phone]    01/21/18  2:45 PM      Dictation performed using Dragon dictation software

## 2018-01-22 ENCOUNTER — APPOINTMENT (OUTPATIENT)
Dept: MRI IMAGING | Facility: HOSPITAL | Age: 63
End: 2018-01-22

## 2018-01-22 PROBLEM — L03.90 CELLULITIS: Status: ACTIVE | Noted: 2018-01-22

## 2018-01-22 LAB
ABO + RH BLD: NORMAL
ALBUMIN SERPL-MCNC: 1.9 G/DL (ref 3.5–5.2)
ALBUMIN/GLOB SERPL: 0.4 G/DL
ALP SERPL-CCNC: 346 U/L (ref 39–117)
ALT SERPL W P-5'-P-CCNC: 19 U/L (ref 1–33)
ANION GAP SERPL CALCULATED.3IONS-SCNC: 14.3 MMOL/L
AST SERPL-CCNC: 56 U/L (ref 1–32)
BH BB BLOOD EXPIRATION DATE: NORMAL
BH BB BLOOD TYPE BARCODE: 5100
BH BB DISPENSE STATUS: NORMAL
BH BB PRODUCT CODE: NORMAL
BH BB UNIT NUMBER: NORMAL
BILIRUB SERPL-MCNC: 0.4 MG/DL (ref 0.1–1.2)
BUN BLD-MCNC: 32 MG/DL (ref 8–23)
BUN/CREAT SERPL: 28.3 (ref 7–25)
CALCIUM SPEC-SCNC: 7.7 MG/DL (ref 8.6–10.5)
CHLORIDE SERPL-SCNC: 98 MMOL/L (ref 98–107)
CHROMATIN AB SERPL-ACNC: 42.6 IU/ML (ref 0–14)
CO2 SERPL-SCNC: 21.7 MMOL/L (ref 22–29)
CREAT BLD-MCNC: 1.13 MG/DL (ref 0.57–1)
DEPRECATED RDW RBC AUTO: 53.6 FL (ref 37–54)
ERYTHROCYTE [DISTWIDTH] IN BLOOD BY AUTOMATED COUNT: 20.8 % (ref 11.7–13)
FERRITIN SERPL-MCNC: 1812 NG/ML (ref 13–150)
GFR SERPL CREATININE-BSD FRML MDRD: 59 ML/MIN/1.73
GLOBULIN UR ELPH-MCNC: 4.8 GM/DL
GLUCOSE BLD-MCNC: 78 MG/DL (ref 65–99)
HCT VFR BLD AUTO: 26.1 % (ref 35.6–45.5)
HGB BLD-MCNC: 8 G/DL (ref 11.9–15.5)
MCH RBC QN AUTO: 22.1 PG (ref 26.9–32)
MCHC RBC AUTO-ENTMCNC: 30.7 G/DL (ref 32.4–36.3)
MCV RBC AUTO: 72.1 FL (ref 80.5–98.2)
PLATELET # BLD AUTO: 120 10*3/MM3 (ref 140–500)
PMV BLD AUTO: ABNORMAL FL (ref 6–12)
POTASSIUM BLD-SCNC: 3.7 MMOL/L (ref 3.5–5.2)
PROCALCITONIN SERPL-MCNC: 0.94 NG/ML (ref 0.1–0.25)
PROT SERPL-MCNC: 6.7 G/DL (ref 6–8.5)
RBC # BLD AUTO: 3.62 10*6/MM3 (ref 3.9–5.2)
RPR SER QL: NORMAL
SODIUM BLD-SCNC: 134 MMOL/L (ref 136–145)
UNIT  ABO: NORMAL
UNIT  RH: NORMAL
WBC NRBC COR # BLD: 3.85 10*3/MM3 (ref 4.5–10.7)

## 2018-01-22 PROCEDURE — 83520 IMMUNOASSAY QUANT NOS NONAB: CPT | Performed by: INTERNAL MEDICINE

## 2018-01-22 PROCEDURE — 86235 NUCLEAR ANTIGEN ANTIBODY: CPT | Performed by: INTERNAL MEDICINE

## 2018-01-22 PROCEDURE — 93010 ELECTROCARDIOGRAM REPORT: CPT | Performed by: INTERNAL MEDICINE

## 2018-01-22 PROCEDURE — 84145 PROCALCITONIN (PCT): CPT | Performed by: INTERNAL MEDICINE

## 2018-01-22 PROCEDURE — 82728 ASSAY OF FERRITIN: CPT | Performed by: INTERNAL MEDICINE

## 2018-01-22 PROCEDURE — 70553 MRI BRAIN STEM W/O & W/DYE: CPT

## 2018-01-22 PROCEDURE — 85027 COMPLETE CBC AUTOMATED: CPT | Performed by: INTERNAL MEDICINE

## 2018-01-22 PROCEDURE — 25010000002 VANCOMYCIN PER 500 MG: Performed by: HOSPITALIST

## 2018-01-22 PROCEDURE — 99233 SBSQ HOSP IP/OBS HIGH 50: CPT | Performed by: INTERNAL MEDICINE

## 2018-01-22 PROCEDURE — 86256 FLUORESCENT ANTIBODY TITER: CPT | Performed by: INTERNAL MEDICINE

## 2018-01-22 PROCEDURE — 86431 RHEUMATOID FACTOR QUANT: CPT | Performed by: INTERNAL MEDICINE

## 2018-01-22 PROCEDURE — 80053 COMPREHEN METABOLIC PANEL: CPT | Performed by: INTERNAL MEDICINE

## 2018-01-22 PROCEDURE — A9577 INJ MULTIHANCE: HCPCS | Performed by: HOSPITALIST

## 2018-01-22 PROCEDURE — 0 GADOBENATE DIMEGLUMINE 529 MG/ML SOLUTION: Performed by: HOSPITALIST

## 2018-01-22 PROCEDURE — 93005 ELECTROCARDIOGRAM TRACING: CPT | Performed by: HOSPITALIST

## 2018-01-22 PROCEDURE — 25010000002 ACYCLOVIR PER 5 MG: Performed by: INTERNAL MEDICINE

## 2018-01-22 PROCEDURE — C1751 CATH, INF, PER/CENT/MIDLINE: HCPCS

## 2018-01-22 PROCEDURE — 25010000002 CEFEPIME PER 500 MG: Performed by: INTERNAL MEDICINE

## 2018-01-22 PROCEDURE — 86225 DNA ANTIBODY NATIVE: CPT | Performed by: INTERNAL MEDICINE

## 2018-01-22 RX ORDER — SODIUM CHLORIDE 0.9 % (FLUSH) 0.9 %
10 SYRINGE (ML) INJECTION EVERY 12 HOURS SCHEDULED
Status: DISCONTINUED | OUTPATIENT
Start: 2018-01-22 | End: 2018-01-26

## 2018-01-22 RX ORDER — ACETAMINOPHEN 650 MG/1
650 SUPPOSITORY RECTAL EVERY 4 HOURS PRN
Status: DISCONTINUED | OUTPATIENT
Start: 2018-01-22 | End: 2018-01-27 | Stop reason: HOSPADM

## 2018-01-22 RX ORDER — ALPRAZOLAM 0.5 MG/1
0.5 TABLET ORAL ONCE
Status: COMPLETED | OUTPATIENT
Start: 2018-01-22 | End: 2018-01-22

## 2018-01-22 RX ORDER — SODIUM CHLORIDE 0.9 % (FLUSH) 0.9 %
10 SYRINGE (ML) INJECTION AS NEEDED
Status: DISCONTINUED | OUTPATIENT
Start: 2018-01-22 | End: 2018-01-27 | Stop reason: HOSPADM

## 2018-01-22 RX ORDER — SODIUM CHLORIDE 9 MG/ML
100 INJECTION, SOLUTION INTRAVENOUS CONTINUOUS
Status: DISCONTINUED | OUTPATIENT
Start: 2018-01-22 | End: 2018-01-27 | Stop reason: HOSPADM

## 2018-01-22 RX ORDER — SODIUM CHLORIDE 0.9 % (FLUSH) 0.9 %
10 SYRINGE (ML) INJECTION AS NEEDED
Status: DISCONTINUED | OUTPATIENT
Start: 2018-01-22 | End: 2018-01-22

## 2018-01-22 RX ADMIN — HYDROCODONE BITARTRATE AND ACETAMINOPHEN 1 TABLET: 5; 325 TABLET ORAL at 00:02

## 2018-01-22 RX ADMIN — FERROUS SULFATE TAB 325 MG (65 MG ELEMENTAL FE) 325 MG: 325 (65 FE) TAB at 08:32

## 2018-01-22 RX ADMIN — ACETAMINOPHEN 650 MG: 325 TABLET ORAL at 20:51

## 2018-01-22 RX ADMIN — Medication 1 CAPSULE: at 08:32

## 2018-01-22 RX ADMIN — CYCLOSPORINE 1 DROP: 0.5 EMULSION OPHTHALMIC at 08:32

## 2018-01-22 RX ADMIN — PANTOPRAZOLE SODIUM 40 MG: 40 INJECTION, POWDER, FOR SOLUTION INTRAVENOUS at 19:06

## 2018-01-22 RX ADMIN — CYCLOSPORINE 1 DROP: 0.5 EMULSION OPHTHALMIC at 20:51

## 2018-01-22 RX ADMIN — ACETAMINOPHEN 650 MG: 325 TABLET ORAL at 00:00

## 2018-01-22 RX ADMIN — SODIUM CHLORIDE 100 ML/HR: 9 INJECTION, SOLUTION INTRAVENOUS at 22:58

## 2018-01-22 RX ADMIN — PANTOPRAZOLE SODIUM 40 MG: 40 INJECTION, POWDER, FOR SOLUTION INTRAVENOUS at 08:32

## 2018-01-22 RX ADMIN — ACETAMINOPHEN 650 MG: 650 SUPPOSITORY RECTAL at 23:59

## 2018-01-22 RX ADMIN — VANCOMYCIN HYDROCHLORIDE 1000 MG: 1 INJECTION, SOLUTION INTRAVENOUS at 12:17

## 2018-01-22 RX ADMIN — ACETAMINOPHEN 650 MG: 325 TABLET ORAL at 05:53

## 2018-01-22 RX ADMIN — HYDROCODONE BITARTRATE AND ACETAMINOPHEN 1 TABLET: 5; 325 TABLET ORAL at 06:25

## 2018-01-22 RX ADMIN — GABAPENTIN 200 MG: 100 CAPSULE ORAL at 20:51

## 2018-01-22 RX ADMIN — WATER 2 G: 1 INJECTION INTRAMUSCULAR; INTRAVENOUS; SUBCUTANEOUS at 19:06

## 2018-01-22 RX ADMIN — GADOBENATE DIMEGLUMINE 12 ML: 529 INJECTION, SOLUTION INTRAVENOUS at 18:25

## 2018-01-22 RX ADMIN — ALPRAZOLAM 0.5 MG: 0.5 TABLET ORAL at 17:38

## 2018-01-22 RX ADMIN — TEMAZEPAM 15 MG: 15 CAPSULE ORAL at 20:51

## 2018-01-22 RX ADMIN — SODIUM CHLORIDE 500 ML: 9 INJECTION, SOLUTION INTRAVENOUS at 15:41

## 2018-01-22 RX ADMIN — ESTRADIOL 1 MG: 1 TABLET ORAL at 08:32

## 2018-01-22 RX ADMIN — ACYCLOVIR SODIUM 620 MG: 1000 INJECTION, SOLUTION INTRAVENOUS at 15:41

## 2018-01-22 RX ADMIN — GABAPENTIN 200 MG: 100 CAPSULE ORAL at 08:37

## 2018-01-22 RX ADMIN — Medication 10 ML: at 20:52

## 2018-01-22 RX ADMIN — METOPROLOL TARTRATE 5 MG: 5 INJECTION, SOLUTION INTRAVENOUS at 20:51

## 2018-01-22 RX ADMIN — AMLODIPINE BESYLATE 5 MG: 5 TABLET ORAL at 08:32

## 2018-01-22 RX ADMIN — WATER 2 G: 1 INJECTION INTRAMUSCULAR; INTRAVENOUS; SUBCUTANEOUS at 05:53

## 2018-01-22 RX ADMIN — SODIUM CHLORIDE 75 ML/HR: 9 INJECTION, SOLUTION INTRAVENOUS at 08:32

## 2018-01-22 RX ADMIN — SODIUM CHLORIDE 100 ML/HR: 9 INJECTION, SOLUTION INTRAVENOUS at 17:39

## 2018-01-22 NOTE — PROGRESS NOTES
" LOS: 3 days     Chief Complaint:  Follow-up fever    History from pt and family.    Interval History:  Still w/ fever. Confusion is about the same. Family says she was \"sharp as a tack\" just a few weeks ago. She still has BLE edema with sharp pain upon palpation relieved w/ rest. She is tolerating antibiotics w/o rash or diarrhea. She has some cough but is not particularly short of air.    ROS: no n/v/d    Vital Signs  Temp:  [99 °F (37.2 °C)-100.9 °F (38.3 °C)] 99 °F (37.2 °C)  Heart Rate:  [118-130] 124  Resp:  [16-18] 18  BP: (126-152)/(72-94) 127/88    Physical Exam:  General: awake, chronically ill appearing   Head: Normocephalic, atraumatic  Eyes: PERRL, EOMI, no scleral icterus, no conjunctival pallor, no conjunctival hemorrhages.   ENT: MM dry, cracked lips, OP clear, no thrush. Fair dentition.   Neck: Supple, no visible thyromegaly  Cardiovascular: tachycardic, RR, no murmurs, rubs, or gallops; 1+ pitting LE edema  Respiratory: Lungs are clear to ascultation bilaterally, no rales or wheezing; normal work of breathing on ambient air  GI: Abdomen is soft, non-tender, non-distended, normal bowel sounds in all four quadrants; no hepatosplenomegaly, no masses palpated  : no Pineda catheter present  Musculoskeletal: B ankle tenderness and swelling  Skin: thin skin on shins, 1 blister noted, no significant erythema  Neurological: Alert and oriented x 2, cranial nerves 2-12 grossly intact, motor strength 5/5 in all four extremities  Psychiatric: mildly confused, calm, pleasant  Vasc: no cyanosis; PICC w/o erythema    Meds:    Current Facility-Administered Medications:   •  acetaminophen (TYLENOL) tablet 650 mg, 650 mg, Oral, Q6H PRN, Hugo Elam MD, 650 mg at 01/22/18 0553  •  amLODIPine (NORVASC) tablet 5 mg, 5 mg, Oral, Q24H, Elie Morales MD, 5 mg at 01/22/18 0832  •  ceFEPime (MAXIPIME) in SWFI 2g/10ml IV PUSH syringe, 2 g, Intravenous, Q12H, Dylon Andrews MD, 2 g at 01/22/18 0553  •  " cycloSPORINE (RESTASIS) 0.05 % ophthalmic emulsion 1 drop, 1 drop, Both Eyes, BID, Hugo Elam MD, 1 drop at 01/22/18 0832  •  dextrose (D50W) solution 50 mL, 50 mL, Intravenous, Q1H PRN, Vinayak Feliciano MD, 50 mL at 01/20/18 0003  •  estradiol (ESTRACE) tablet 1 mg, 1 mg, Oral, Daily, Hugo Elam MD, 1 mg at 01/22/18 0832  •  Ferrex 150 forte plus capsule 1 capsule, 1 capsule, Oral, Daily With Breakfast, Hugo Elam MD, 1 capsule at 01/22/18 0832  •  ferrous sulfate tablet 325 mg, 325 mg, Oral, BID With Meals, Hugo Elam MD, 325 mg at 01/22/18 0832  •  gabapentin (NEURONTIN) capsule 200 mg, 200 mg, Oral, Q12H, Hugo Elam MD, 200 mg at 01/22/18 0837  •  Hold medication, 1 each, Does not apply, Continuous PRN, Dylon Andrews MD  •  Hold medication, 1 each, Does not apply, Continuous PRN, Dylon Andrews MD  •  hydrALAZINE (APRESOLINE) injection 10 mg, 10 mg, Intravenous, Q6H PRN, Elie Morales MD  •  HYDROcodone-acetaminophen (NORCO) 5-325 MG per tablet 1 tablet, 1 tablet, Oral, Q6H PRN, Hugo Elam MD, 1 tablet at 01/22/18 0625  •  pantoprazole (PROTONIX) injection 40 mg, 40 mg, Intravenous, BID AC, Elie Morales MD, 40 mg at 01/22/18 0832  •  Pharmacy to Dose acyclovir (ZOVIRAX), , Does not apply, Continuous PRN, Dylon Andrews MD  •  Pharmacy to dose vancomycin, , Does not apply, Continuous PRN, Dylon Andrews MD  •  sodium chloride 0.9 % flush 10 mL, 10 mL, Intracatheter, Q12H, Raad Adams MD  •  sodium chloride 0.9 % flush 10 mL, 10 mL, Intracatheter, PRN, Raad Adams MD  •  sodium chloride 0.9 % flush 10 mL, 10 mL, Intracatheter, PRNRaad MD  •  sodium chloride 0.9 % flush 10 mL, 10 mL, Intracatheter, Raad HAN MD  •  sodium chloride 0.9 % flush 10 mL, 10 mL, Intracatheter, Raad HAN MD  •  sodium chloride 0.9 % flush 10 mL, 10 mL, Intracatheter, Raad HAN MD  •   temazepam (RESTORIL) capsule 15 mg, 15 mg, Oral, Nightly PRN, Hugo Elam MD, 15 mg at 01/20/18 2131  •  vancomycin (VANCOCIN) in iso-osmotic dextrose IVPB 1 g (premix) 200 mL, 1,000 mg, Intravenous, Q24H, Elie Morales MD, 1,000 mg at 01/22/18 1217    LABS:    Lab Results   Component Value Date    WBC 3.85 (L) 01/22/2018    HGB 8.0 (L) 01/22/2018    HCT 26.1 (L) 01/22/2018    MCV 72.1 (L) 01/22/2018     (L) 01/22/2018     Lab Results   Component Value Date    GLUCOSE 78 01/22/2018    BUN 32 (H) 01/22/2018    CREATININE 1.13 (H) 01/22/2018    EGFRIFAFRI 59 (L) 01/22/2018    BCR 28.3 (H) 01/22/2018    CO2 21.7 (L) 01/22/2018    CALCIUM 7.7 (L) 01/22/2018    ALBUMIN 1.90 (L) 01/22/2018    LABIL2 0.4 01/22/2018    AST 56 (H) 01/22/2018    ALT 19 01/22/2018    CRP 10.72 (H) 01/20/2018       Cortisol 7  Procal 1  Urine Histo Ag pending  HIV negative  Hep C negative  RPR non-reactive    LP Results:  11 WBCs (86% lymphs)  1640 RBCs  Pro 48  Glc 45  Gram stain and culture negative    Microbiology:  1/19 BCx: NGTD  1/20 RVP: negative  1/21 SpCx: NGTD  1/21 CSF Cx: NGTD    Radiology (personally reviewed):   MRI brain ordered    Assessment/Plan   1. Fever of unknown origin  2. Encephalopathy  3. B ankle pain and swelling  4. Rheumatoid arthritis  5. History of ischemic colitis    Difficult case. LP not suggestive of bacterial meningitis but does have 11 WBCs with mostly lymphocytes. Given confusion, it gives me some suspicion for HSV infection though her encephalopathy is not quite as profound as we typically see. Given there is no clear diagnosis at this point, I think it is reasonable to start empiric IV acyclovir. Unfortunately there is not enough CSF available to send the HSV PCR and she is reluctant to undergo another LP.    She remains on empiric vancomycin and cefepime given CT chest findings.     LDH was slightly high and a few enlarged LNs seen on scan. Flow cytometry is pending.    Will add on  ABI, ANCA, ferritin and repeat a procal. F/U pending cultures.    I do not think she has cellulitis.    Thank you for this consult. ID will follow. I have discussed this case with her son and daughter-in-law.    ADDENDUM:  Spoke w/ micro lab. They do have enough CSF to run HSV 1/2 PCR so will add on that test now.

## 2018-01-22 NOTE — NURSING NOTE
Called and spoke to Lance Hedrick about placing a Picc line tonight. No consent at this time and Lance spoke with the patient and family and they are requesting it be placed in the morning due to trying to rest tonight. Pt currently has 1 working IV for medication throughout the night. IV team will follow up with pt in the am.

## 2018-01-22 NOTE — NURSING NOTE
Spoke with Dr. Morales about HR in the 140's. Advised to watch for sepsis and to give 500cc bolus of ns over an hour and start NS @ 100 after that.

## 2018-01-22 NOTE — NURSING NOTE
Called in consult to Dr. Rockwell. Spoke to Peyton at [facility].

## 2018-01-22 NOTE — DISCHARGE PLACEMENT REQUEST
"Catracho Abbott (62 y.o. Female)     Date of Birth Social Security Number Address Home Phone MRN    1955  [address] [id number] [phone] [id number]    Orthodox Marital Status          Orthodoxy        Admission Date Admission Type Admitting Provider Attending Provider Department, Room/Bed    1/19/18 Emergency Hugo Elam MD Beard, Lyle E, MD [address], 508/1    Discharge Date Discharge Disposition Discharge Destination                      Attending Provider: Elie Morales MD     Allergies:  Buffered Aspirin, Ace Inhibitors, Aurothioglucose, Latex, Keflex [Cephalexin]    Isolation:  None   Infection:  None   Code Status:  FULL    Ht:  149.9 cm (59\")   Wt:  61.7 kg (136 lb)    Admission Cmt:  None   Principal Problem:  FUO (fever of unknown origin) [R50.9]                 Active Insurance as of 1/19/2018     Primary Coverage     Payor Plan Insurance Group Employer/Plan Group    MEDICARE MEDICARE A & B      Payor Plan Address Payor Plan Phone Number Effective From Effective To    [address] [phone] 10/1/1998     [place] [address]       Subscriber Name Subscriber Birth Date Member ID       CATRACHO ABBOTT 1955 [id number]                 Emergency Contacts      (Rel.) Home Phone Work Phone Mobile Phone    Prashant Abbott (Son) [phone] -- [phone]              "

## 2018-01-22 NOTE — NURSING NOTE
RN went in w/GI to help with a rectal exam, d/t coffee ground stools at every urination. Found L leg to have a blister on the shin. Call out to [facility] around 18:30 to discuss this and the following. Also, need to look at getting a PICC, suggest double lumen for blood draws and abx. H&H put in for recheck tonight. May need another unit PRBC. Temp has been holding steady at 100.5 for the last 4-5 hours, but this is after 650mg of Tylenol and a Norco 5. The HR has also been sustaining in the 130s since the LP. Another call out to [facility] at 7:15pm. CTM.

## 2018-01-22 NOTE — PROGRESS NOTES
Continued Stay Note  [facility]     Patient Name: Domitila Valera  MRN: [id number]  Today's Date: 1/22/2018    Admit Date: 1/19/2018          Discharge Plan       01/22/18 1216    Case Management/Social Work Plan    Plan Rehab vs home with     Additional Comments IMM given on 1-19. Face sheet, PCP and pharmacy verified. Patient's son , Prashant--[id number],   is at bedside. Patient agreeable to talking with him present . He is staying with his mother. She is current with [facility]. Message left for Tisha to advise  of admission. await her return call. Patient with Medicare only. Son and patient want to consider Rehab prior to return home. Son suggested [facility]. Left  list  of sub acute rehabs in room in case needed . Asked  [facility] nurse, Sandra   if  physical therapy order can be  obtained . CCP to follow for therapy to assess and will assist with options.......... TERESA Santos              Discharge Codes     None            TERESA Santos

## 2018-01-22 NOTE — PROGRESS NOTES
"DAILY PROGRESS NOTE  [facility]    Patient Identification:  Name: Domitila Valera  Age: 62 y.o.  Sex: female  :  1955  MRN: [id number]         Primary Care Physician: Javier Soriano III, MD    Subjective:  Interval History:She complains of pain in feet.  Nurse reports patient had dark BM and hgb lower.    Objective:    Scheduled Meds:    acyclovir 10 mg/kg Intravenous Q12H   ALPRAZolam 0.5 mg Oral Once   amLODIPine 5 mg Oral Q24H   cefepime 2 g Intravenous Q12H   cycloSPORINE 1 drop Both Eyes BID   estradiol 1 mg Oral Daily   Ferrex 150 forte plus 1 capsule Oral Daily With Breakfast   ferrous sulfate 325 mg Oral BID With Meals   gabapentin 200 mg Oral Q12H   pantoprazole 40 mg Intravenous BID AC   sodium chloride 10 mL Intracatheter Q12H   vancomycin 1,000 mg Intravenous Q24H     Continuous Infusions:    hold 1 each   hold 1 each   Pharmacy to Dose acyclovir (ZOVIRAX)    Pharmacy to dose vancomycin        Vital signs in last 24 hours:  Temp:  [99 °F (37.2 °C)-100.9 °F (38.3 °C)] 99 °F (37.2 °C)  Heart Rate:  [118-130] 124  Resp:  [16-18] 18  BP: (126-152)/(72-94) 127/88    Intake/Output:    Intake/Output Summary (Last 24 hours) at 18 1424  Last data filed at 18 0832   Gross per 24 hour   Intake             2210 ml   Output                0 ml   Net             2210 ml       Exam:  /88 (BP Location: Right arm, Patient Position: Lying)  Pulse (!) 124  Temp 99 °F (37.2 °C) (Oral)   Resp 18  Ht 149.9 cm (59\")  Wt 61.7 kg (136 lb)  SpO2 95%  BMI 27.47 kg/m2    General Appearance:    Alert, cooperative, no distress   Head:    Normocephalic, without obvious abnormality, atraumatic   Eyes:       Throat:   Lips, tongue, gums normal   Neck:   Supple, symmetrical, trachea midline, no JVD   Lungs:     Clear to auscultation bilaterally, respirations unlabored   Chest Wall:    No tenderness or deformity    Heart:    Regular rate and rhythm, S1 and S2 normal, no murmur,no  Rub or " gallop   Abdomen:     Soft, non-tender, bowel sounds active, no masses, no organomegaly    Extremities:   Extremities normal, atraumatic, no cyanosis or edema, cellulitis in lower extremity.   Pulses:      Skin:   Skin is warm and dry,  no rashes or palpable lesions   Neurologic:   no focal deficits noted      [unfilled]  Data Review:    Results from last 7 days  Lab Units 01/22/18  0455 01/21/18  0541 01/20/18  0445   SODIUM mmol/L 134* 135* 135*   POTASSIUM mmol/L 3.7 4.1 4.7   CHLORIDE mmol/L 98 97* 101   CO2 mmol/L 21.7* 25.4 21.6*   BUN mg/dL 32* 34* 48*   CREATININE mg/dL 1.13* 1.14* 1.24*   GLUCOSE mg/dL 78 80 69   CALCIUM mg/dL 7.7* 7.7* 8.0*       Results from last 7 days  Lab Units 01/22/18  0455 01/21/18  1937 01/21/18  0541  01/20/18  0602   WBC 10*3/mm3 3.85*  --  5.23  --  4.88   HEMOGLOBIN g/dL 8.0* 8.0* 6.6*  < > 7.0*   HEMATOCRIT % 26.1* 25.4* 21.9*  < > 22.9*   PLATELETS 10*3/mm3 120*  --  121*  --  126*   < > = values in this interval not displayed.          No results found for: TROPONINT        Results from last 7 days  Lab Units 01/22/18 0455 01/21/18  0541 01/19/18  1457   ALK PHOS U/L 346* 392* 543*   BILIRUBIN mg/dL 0.4 0.4 0.5   ALT (SGPT) U/L 19 20 26   AST (SGOT) U/L 56* 57* 78*             No results found for: POCGLU        Patient Active Problem List   Diagnosis Code   • FUO (fever of unknown origin) R50.9   • Hyperkalemia E87.5   • Benign essential HTN I10   • Cellulitis of right upper arm L03.113   • Hyponatremia E87.1   • JEFFERY (acute kidney injury) N17.9   • Anemia D64.9   • GI bleed K92.2   • Cellulitis L03.90       Assessment:  Active Hospital Problems (** Indicates Principal Problem)    Diagnosis Date Noted   • **FUO (fever of unknown origin) [R50.9] 01/19/2018   • Cellulitis [L03.90] 01/22/2018   • GI bleed [K92.2] 01/21/2018   • Anemia [D64.9] 01/20/2018   • Hyperkalemia [E87.5] 01/19/2018   • Hyponatremia [E87.1] 01/19/2018   • JEFFERY (acute kidney injury) [N17.9] 01/19/2018       Resolved Hospital Problems    Diagnosis Date Noted Date Resolved   No resolved problems to display.       Plan:  ID consult noted.  Work up in progress  plans and antibiotics per  ID.    Protonix IV    Elie Morales MD  1/22/2018  2:24 PM

## 2018-01-22 NOTE — PROGRESS NOTES
"   LOS: 3 days   Patient Care Team:  Javier Soriano III, MD as PCP - General (Internal Medicine)    Chief Complaint/ Reason for encounter: Acute renal failure, hyponatremia        Subjective     Fever    Pertinent negatives include no chest pain or nausea.   Foot Pain   Associated symptoms include a fever and weakness. Pertinent negatives include no chest pain or nausea.       Subjective:  Symptoms:  Stable.  She reports weakness.  No shortness of breath or chest pain.  (More alert, still confused  Low-grade fevers persist, LP essentially negative).    Diet:  Poor intake.  No nausea.    Activity level: Impaired due to weakness.    Pain:  She reports no pain.          History taken from: Patient and chart    Objective     Vital Signs  Temp:  [99 °F (37.2 °C)-100.9 °F (38.3 °C)] 99 °F (37.2 °C)  Heart Rate:  [118-132] 124  Resp:  [16-18] 18  BP: (126-153)/(72-94) 127/88       Wt Readings from Last 1 Encounters:   01/22/18 0500 61.7 kg (136 lb)   01/21/18 0737 63.3 kg (139 lb 8 oz)   01/20/18 0546 63.1 kg (139 lb 1.6 oz)   01/19/18 2243 61.6 kg (135 lb 14.4 oz)   01/19/18 1351 57.6 kg (127 lb)       Objective:  General Appearance:  Comfortable, ill-appearing, in no acute distress and not in pain (Thin, mildly cachectic).    Vital signs: (most recent): Blood pressure 127/88, pulse (!) 124, temperature 99 °F (37.2 °C), temperature source Oral, resp. rate 18, height 149.9 cm (59\"), weight 61.7 kg (136 lb), SpO2 95 %.  Vital signs are normal.  Fever.    Output: Producing urine.    HEENT: Normal HEENT exam.    Lungs:  Normal respiratory rate and normal effort.  She is not in respiratory distress.  Breath sounds clear to auscultation.  There are decreased breath sounds.  No wheezes or rales.    Heart: Normal rate.  Regular rhythm.    Abdomen: Abdomen is soft and non-distended.  Bowel sounds are normal.   There is no abdominal tenderness.  There is no epigastric area or no suprapubic area tenderness.  There is no rebound " tenderness.   There is no mass.   Extremities: Normal range of motion.  There is dependent edema.  There is no deformity.    Pulses: Distal pulses are intact.    Neurological: Patient is alert.    Skin:  Warm and dry.  There is a rash.  No ecchymosis or ulceration.             Results Review:    Past Medical History: reviewed and updated  Past Medical History:   Diagnosis Date   • Hypertension    • Ischemic colitis    • Rheumatoid arthritis    • Thyroid nodule          Allergies:  Allergies   Allergen Reactions   • Buffered Aspirin Rash and Shortness Of Breath   • Ace Inhibitors      cough   • Aurothioglucose Diarrhea     Increased AST & ALT   • Latex Itching   • Keflex [Cephalexin] Hives and Rash       Intake/Output:     Intake/Output Summary (Last 24 hours) at 01/22/18 1122  Last data filed at 01/22/18 0832   Gross per 24 hour   Intake             2510 ml   Output                0 ml   Net             2510 ml         DATA:  Interval chart, labs and notes reviewed.  The following labs personally reviewed by me.  Renal ultrasound personally reviewed, no obstruction  I personally reviewed her old records from [facility], baseline creatinine 1.4  Discussed with her son at bedside, interval history obtained from him as well regarding recent health and complaints    Labs:   Recent Results (from the past 24 hour(s))   Glucose, CSF - Cerebrospinal Fluid, Lumbar Puncture    Collection Time: 01/21/18  2:53 PM   Result Value Ref Range    Glucose, CSF 45 40 - 70 mg/dL   Protein, CSF - Cerebrospinal Fluid, Lumbar Puncture    Collection Time: 01/21/18  2:53 PM   Result Value Ref Range    Protein, Total (CSF) 48.0 (H) 15.0 - 45.0 mg/dL   Culture, CSF - Cerebrospinal Fluid, Lumbar Puncture    Collection Time: 01/21/18  2:53 PM   Result Value Ref Range    CSF Culture No growth     Gram Stain Result No WBCs or organisms seen    Cell Count, CSF - Cerebrospinal Fluid, Lumbar Puncture    Collection Time: 01/21/18  2:53 PM   Result Value  Ref Range    Color, CSF Pink (A) Colorless    Appearance, CSF Slightly Hazy (A) Clear    RBC, CSF 1640 (H) 0 - 0 /mm3    Nucleated Cells, CSF 11 (H) 0 - 5 /mm3    Tube Number, CSF 1    Spinal fluid differential - Cerebrospinal Fluid, Lumbar Puncture    Collection Time: 01/21/18  2:53 PM   Result Value Ref Range    Neutrophils, CSF 2 %    Lymphocytes, CSF 86 %    Monocytes, CSF 5 %    Mononuclear, CSF 7 %    Other Cells, CSF 1 /100 WBCs   Hemoglobin & Hematocrit, Blood    Collection Time: 01/21/18  7:37 PM   Result Value Ref Range    Hemoglobin 8.0 (L) 11.9 - 15.5 g/dL    Hematocrit 25.4 (L) 35.6 - 45.5 %   Magnesium    Collection Time: 01/21/18  7:37 PM   Result Value Ref Range    Magnesium 1.9 1.6 - 2.4 mg/dL   CBC (No Diff)    Collection Time: 01/22/18  4:55 AM   Result Value Ref Range    WBC 3.85 (L) 4.50 - 10.70 10*3/mm3    RBC 3.62 (L) 3.90 - 5.20 10*6/mm3    Hemoglobin 8.0 (L) 11.9 - 15.5 g/dL    Hematocrit 26.1 (L) 35.6 - 45.5 %    MCV 72.1 (L) 80.5 - 98.2 fL    MCH 22.1 (L) 26.9 - 32.0 pg    MCHC 30.7 (L) 32.4 - 36.3 g/dL    RDW 20.8 (H) 11.7 - 13.0 %    RDW-SD 53.6 37.0 - 54.0 fl    MPV  6.0 - 12.0 fL    Platelets 120 (L) 140 - 500 10*3/mm3   Comprehensive Metabolic Panel    Collection Time: 01/22/18  4:55 AM   Result Value Ref Range    Glucose 78 65 - 99 mg/dL    BUN 32 (H) 8 - 23 mg/dL    Creatinine 1.13 (H) 0.57 - 1.00 mg/dL    Sodium 134 (L) 136 - 145 mmol/L    Potassium 3.7 3.5 - 5.2 mmol/L    Chloride 98 98 - 107 mmol/L    CO2 21.7 (L) 22.0 - 29.0 mmol/L    Calcium 7.7 (L) 8.6 - 10.5 mg/dL    Total Protein 6.7 6.0 - 8.5 g/dL    Albumin 1.90 (L) 3.50 - 5.20 g/dL    ALT (SGPT) 19 1 - 33 U/L    AST (SGOT) 56 (H) 1 - 32 U/L    Alkaline Phosphatase 346 (H) 39 - 117 U/L    Total Bilirubin 0.4 0.1 - 1.2 mg/dL    eGFR  African Amer 59 (L) >60 mL/min/1.73    Globulin 4.8 gm/dL    A/G Ratio 0.4 g/dL    BUN/Creatinine Ratio 28.3 (H) 7.0 - 25.0    Anion Gap 14.3 mmol/L   Prepare RBC, 1 Units    Collection Time:  01/22/18  6:00 AM   Result Value Ref Range    Product Code [id number]     Unit Number [id number]     UNIT  ABO O     UNIT  RH POS     Dispense Status PT     Blood Type OPOS     Blood Expiration Date 021957784815     Blood Type Barcode 5100        Radiology:  Imaging Results (last 24 hours)     Procedure Component Value Units Date/Time    IR Lumbar Puncture Diagnosis [[id number]] Collected:  01/21/18 1625     Updated:  01/21/18 1631    Narrative:       IR LUMBAR PUNCTURE DIAGNOSIS-     INDICATIONS: Fever     FINDINGS:     Following detailed discussion with the patient of the risks, benefits,  alternatives of the procedure, verbal and written informed consent was  obtained from the patient's son, this patient was unable to provide  consent.     Patient was placed in a prone position on the fluoroscopy table. A final  timeout was performed verifying patient identity and procedure.     A location was chosen at the L3/4 level, the overlying skin was cleaned  and anesthetized, and a 20-gauge needle was advanced into the thecal  sac, revealing clear CSF. The fluid was extremely slow-flowing despite  repositioning the needle and the patient, and only approximately 2 cc  could be obtained. Opening pressure could not be assessed. The fluid was  sent to the laboratory for further evaluation. Needle was removed  intact.     Fluoroscopy time: 20 seconds, 4 images       Impression:          Lumbar puncture, as described. Collected fluid was sent to the  laboratory for further evaluation.      This report was finalized on 1/21/2018 4:28 PM by Dr. Mt Torers MD.                Medications have been reviewed:  Current Facility-Administered Medications   Medication Dose Route Frequency Provider Last Rate Last Dose   • acetaminophen (TYLENOL) tablet 650 mg  650 mg Oral Q6H PRN Hugo Elam MD   650 mg at 01/22/18 0553   • amLODIPine (NORVASC) tablet 5 mg  5 mg Oral Q24H Elie Moarles MD   5 mg at 01/22/18 0832   •  ceFEPime (MAXIPIME) in FI 2g/10ml IV PUSH syringe  2 g Intravenous Q12H Dylon Andrews MD   2 g at 01/22/18 0553   • cycloSPORINE (RESTASIS) 0.05 % ophthalmic emulsion 1 drop  1 drop Both Eyes BID Hugo Elam MD   1 drop at 01/22/18 0832   • dextrose (D50W) solution 50 mL  50 mL Intravenous Q1H PRN Vinayak Feliciano MD   50 mL at 01/20/18 0003   • estradiol (ESTRACE) tablet 1 mg  1 mg Oral Daily Hugo Elam MD   1 mg at 01/22/18 0832   • Ferrex 150 forte plus capsule 1 capsule  1 capsule Oral Daily With Breakfast Hugo Elam MD   1 capsule at 01/22/18 0832   • ferrous sulfate tablet 325 mg  325 mg Oral BID With Meals Hugo Elam MD   325 mg at 01/22/18 0832   • gabapentin (NEURONTIN) capsule 200 mg  200 mg Oral Q12H Hugo Elam MD   200 mg at 01/22/18 0837   • Hold medication  1 each Does not apply Continuous PRN Dylon Andrews MD       • Hold medication  1 each Does not apply Continuous PRN Dylon Andrews MD       • hydrALAZINE (APRESOLINE) injection 10 mg  10 mg Intravenous Q6H PRN Elie Morales MD       • HYDROcodone-acetaminophen (NORCO) 5-325 MG per tablet 1 tablet  1 tablet Oral Q6H PRN Hugo Elam MD   1 tablet at 01/22/18 0625   • pantoprazole (PROTONIX) injection 40 mg  40 mg Intravenous BID AC Elie Morales MD   40 mg at 01/22/18 0832   • Pharmacy to dose vancomycin   Does not apply Continuous PRN Dylon Andrews MD       • sodium chloride 0.9 % flush 10 mL  10 mL Intracatheter Q12H Raad Adams MD       • sodium chloride 0.9 % flush 10 mL  10 mL Intracatheter PRN Raad Adams MD       • sodium chloride 0.9 % flush 10 mL  10 mL Intracatheter PRN Raad Adams MD       • sodium chloride 0.9 % flush 10 mL  10 mL Intracatheter PRN Raad Adams MD       • sodium chloride 0.9 % flush 10 mL  10 mL Intracatheter PRN Raad Adams MD       • sodium chloride 0.9 % flush 10 mL  10 mL Intracatheter PRN Raad APODACA  MD Bryan       • sodium chloride 0.9 % infusion  75 mL/hr Intravenous Continuous Kip Roberson MD 75 mL/hr at 01/22/18 0832 75 mL/hr at 01/22/18 0832   • temazepam (RESTORIL) capsule 15 mg  15 mg Oral Nightly PRN Hugo Elam MD   15 mg at 01/20/18 2131   • vancomycin (VANCOCIN) in iso-osmotic dextrose IVPB 1 g (premix) 200 mL  1,000 mg Intravenous Q24H Elie Morales MD           Assessment/Plan     Principal Problem:    FUO (fever of unknown origin)  Active Problems:    Hyperkalemia    Hyponatremia    JEFFERY (acute kidney injury)    Anemia    GI bleed      Assessment:  (Assesment  JEFFERY secondary to decreased intravascular volume and sespsis secondary to cellulitis , stable    Chronic kidney disease, recent baseline creatinine around 1.4, near baseline today     HYperkalemia , improved     Hyponatremia , hypovolemic, better today with fluids     LE cellulitis     Metabolic acidosis, improved after bicarbonate drip     RA     HTN         Plan:  Renal function remains stable  Electrolytes have corrected and also stable today  Slightly volume overloaded on exam, discontinue IV fluids  Encourage oral fluid intake  Discussed with Dr. Andrews regarding fevers, LP results  Monitor vancomycin levels closely to avoid potential nephrotoxicity ).             Continue to monitor renal function, electroytes and volume closely   Please call me with any questions or concerns      Kip Roberson MD   [facility]   [phone]    01/22/18  11:22 AM      Dictation performed using Dragon dictation software

## 2018-01-22 NOTE — PROGRESS NOTES
"Pharmacokinetic Evaluation - Vancomycin/acyclovir    Domitila Valera is a 62 y.o. female   MRN: [id number]  : 1955    Day of vancomycin therapy: 2 of 7 (PNA); tr goal 15-20  Day of acyclovir IV: 1 of 10 (CNS)  Consulted by: Dr. Andrews     Other antimicrobials: cefepime     Blood pressure 153/87, pulse (!) 124, temperature 99.4 °F (37.4 °C), temperature source Oral, resp. rate 18, height 149.9 cm (59\"), weight 61.7 kg (136 lb), SpO2 95 %.    Results from last 7 days     Lab Units 18  0455 18  0541 18  0445   CREATININE mg/dL 1.13* 1.14* 1.24*     Estimated Creatinine Clearance: 41.8 mL/min (by C-G formula based on Cr of 1.13).    Results from last 7 days     Lab Units 18  0455 18  1937 18  0541  18  0602   WBC 10*3/mm3 3.85* --  5.23 --  4.88   HEMOGLOBIN g/dL 8.0* 8.0* 6.6* < > 7.0*   HEMATOCRIT % 26.1* 25.4* 21.9* < > 22.9*   PLATELETS 10*3/mm3 120* --  121* --  126*   < > = values in this interval not displayed.     Radiology:    CT Small bilateral pleural effusions with lower lobe atelectasis. Within the lingula and anterior inferior lateral left upper lobe there is peripheral area of consolidation and ground glass opacity consistent with infiltrate in the proper clinical setting.  Cultures:    BCx: NG2d   RVP negative    CSF HSV PCR pending    sputum pending, mixed bacterial morphotypes on gram stain      2018 14:53   Appearance, CSF Slightly Hazy (A)   Color, CSF Pink (A)   Glucose, CSF 45   Monocytes, CSF 5   Mononuclear, CSF 7   Neutrophils, CSF 2   Nucleated Cells, CSF 11 (H)   Other Cells, CSF 1   Protein, Total (CSF) 48.0 (H)   RBC, CSF 1640 (H)   Tube Number, CSF 1     Dosing hx (include troughs if drawn):   Vancomycin Dosing Schedule:  2203   Vancomycin 1250mg ( 19.7mg/kg) x1 dose  Then restarted   ~ 1800   Vancomycin 1250mg ( 19.7mg/kg) x1 dose load  ~ 1300   Vancomycin 1000mg ( 15.8mg/kg) q 24 hours  ~ 1230   " Vancomycin trough prior to 4th overall dose    Assessment:  Renal function is poor, stable CrCl ~40    Plan:  1) Continue vancomycin 1000 mg every 24 hours.  2) Next trough on 1/24 at 1230 after 3 total doses.  3) acyclovir 620 mg Q12H which is 10 mg/kg based on actual body weight of 61.7 and crcl 30-49 mL/min     Jeanna Orozco PharmD, [facility]  1/22/2018 3:04 PM

## 2018-01-22 NOTE — PLAN OF CARE
Problem: Patient Care Overview (Adult)  Goal: Plan of Care Review  Outcome: Ongoing (interventions implemented as appropriate)   01/22/18 0653   Coping/Psychosocial Response Interventions   Plan Of Care Reviewed With patient;family   Patient Care Overview   Progress no change   Outcome Evaluation   Outcome Summary/Follow up Plan T Max 100.9 overnight, tylenol given prn as ordered, tachycardic 1 teens to 130,EKG sinus tach, mag level wnl, Hgb stable 8.0, no active bleeding noted , pt folowing commands, generalized weakness 4+ Edema BLE, PICC line placement pending, family at bsd, will monitor

## 2018-01-22 NOTE — PLAN OF CARE
Problem: Patient Care Overview (Adult)  Goal: Plan of Care Review  Outcome: Ongoing (interventions implemented as appropriate)   01/22/18 4961   Coping/Psychosocial Response Interventions   Plan Of Care Reviewed With patient   Patient Care Overview   Progress no change   Outcome Evaluation   Outcome Summary/Follow up Plan Pt A&O to person only today. HR elevated today between 100-140. other VSS. PICC placed. HGB stable @ 8. able to follow some commands. MRI ordered. BLE edema slightly better. Will continue to monitor vitals and labs.        Problem: Infection, Risk/Actual (Adult)  Goal: Identify Related Risk Factors and Signs and Symptoms  Outcome: Ongoing (interventions implemented as appropriate)    Goal: Infection Prevention/Resolution  Outcome: Ongoing (interventions implemented as appropriate)      Problem: Fall Risk (Adult)  Goal: Identify Related Risk Factors and Signs and Symptoms  Outcome: Outcome(s) achieved Date Met: 01/22/18    Goal: Absence of Falls  Outcome: Ongoing (interventions implemented as appropriate)      Problem: Pain, Acute (Adult)  Goal: Identify Related Risk Factors and Signs and Symptoms  Outcome: Outcome(s) achieved Date Met: 01/22/18    Goal: Acceptable Pain Control/Comfort Level  Outcome: Ongoing (interventions implemented as appropriate)      Problem: Pressure Ulcer Risk (Checo Scale) (Adult,Obstetrics,Pediatric)  Goal: Identify Related Risk Factors and Signs and Symptoms  Outcome: Outcome(s) achieved Date Met: 01/22/18    Goal: Skin Integrity  Outcome: Ongoing (interventions implemented as appropriate)

## 2018-01-23 ENCOUNTER — APPOINTMENT (OUTPATIENT)
Dept: CARDIOLOGY | Facility: HOSPITAL | Age: 63
End: 2018-01-23
Attending: INTERNAL MEDICINE

## 2018-01-23 ENCOUNTER — APPOINTMENT (OUTPATIENT)
Dept: MRI IMAGING | Facility: HOSPITAL | Age: 63
End: 2018-01-23

## 2018-01-23 LAB
ALBUMIN SERPL-MCNC: 1.4 G/DL (ref 3.5–5.2)
ALBUMIN/GLOB SERPL: 0.3 G/DL
ALP SERPL-CCNC: 268 U/L (ref 39–117)
ALT SERPL W P-5'-P-CCNC: 14 U/L (ref 1–33)
ANION GAP SERPL CALCULATED.3IONS-SCNC: 11.4 MMOL/L
AST SERPL-CCNC: 40 U/L (ref 1–32)
BH CV ECHO MEAS - ACS: 1.8 CM
BH CV ECHO MEAS - AO MAX PG: 11 MMHG
BH CV ECHO MEAS - AO MEAN PG (FULL): 2 MMHG
BH CV ECHO MEAS - AO MEAN PG: 6 MMHG
BH CV ECHO MEAS - AO ROOT AREA (BSA CORRECTED): 1.8
BH CV ECHO MEAS - AO ROOT AREA: 6.6 CM^2
BH CV ECHO MEAS - AO ROOT DIAM: 2.9 CM
BH CV ECHO MEAS - AO V2 MAX: 164 CM/SEC
BH CV ECHO MEAS - AO V2 MEAN: 111 CM/SEC
BH CV ECHO MEAS - AO V2 VTI: 22.2 CM
BH CV ECHO MEAS - AVA(I,A): 2.3 CM^2
BH CV ECHO MEAS - AVA(I,D): 2.3 CM^2
BH CV ECHO MEAS - BSA(HAYCOCK): 1.7 M^2
BH CV ECHO MEAS - BSA: 1.6 M^2
BH CV ECHO MEAS - BZI_BMI: 28.9 KILOGRAMS/M^2
BH CV ECHO MEAS - BZI_METRIC_HEIGHT: 149.9 CM
BH CV ECHO MEAS - BZI_METRIC_WEIGHT: 64.9 KG
BH CV ECHO MEAS - CONTRAST EF (2CH): 60.7 ML/M^2
BH CV ECHO MEAS - CONTRAST EF 4CH: 67.3 ML/M^2
BH CV ECHO MEAS - EDV(CUBED): 59.3 ML
BH CV ECHO MEAS - EDV(MOD-SP2): 61 ML
BH CV ECHO MEAS - EDV(MOD-SP4): 55 ML
BH CV ECHO MEAS - EDV(TEICH): 65.9 ML
BH CV ECHO MEAS - EF(CUBED): 73.7 %
BH CV ECHO MEAS - EF(MOD-SP2): 60.7 %
BH CV ECHO MEAS - EF(MOD-SP4): 67.3 %
BH CV ECHO MEAS - EF(TEICH): 66.1 %
BH CV ECHO MEAS - ESV(CUBED): 15.6 ML
BH CV ECHO MEAS - ESV(MOD-SP2): 24 ML
BH CV ECHO MEAS - ESV(MOD-SP4): 18 ML
BH CV ECHO MEAS - ESV(TEICH): 22.3 ML
BH CV ECHO MEAS - FS: 35.9 %
BH CV ECHO MEAS - IVS/LVPW: 1.1
BH CV ECHO MEAS - IVSD: 1.1 CM
BH CV ECHO MEAS - LA DIMENSION: 2.4 CM
BH CV ECHO MEAS - LA/AO: 0.83
BH CV ECHO MEAS - LAT PEAK E' VEL: 9 CM/SEC
BH CV ECHO MEAS - LV DIASTOLIC VOL/BSA (35-75): 34.4 ML/M^2
BH CV ECHO MEAS - LV MASS(C)D: 131 GRAMS
BH CV ECHO MEAS - LV MASS(C)DI: 81.9 GRAMS/M^2
BH CV ECHO MEAS - LV MEAN PG: 4 MMHG
BH CV ECHO MEAS - LV SYSTOLIC VOL/BSA (12-30): 11.3 ML/M^2
BH CV ECHO MEAS - LV V1 MAX: 136 CM/SEC
BH CV ECHO MEAS - LV V1 MEAN: 91.5 CM/SEC
BH CV ECHO MEAS - LV V1 VTI: 19.9 CM
BH CV ECHO MEAS - LVIDD: 3.9 CM
BH CV ECHO MEAS - LVIDS: 2.5 CM
BH CV ECHO MEAS - LVLD AP2: 7.7 CM
BH CV ECHO MEAS - LVLD AP4: 7.4 CM
BH CV ECHO MEAS - LVLS AP2: 6.9 CM
BH CV ECHO MEAS - LVLS AP4: 6.6 CM
BH CV ECHO MEAS - LVOT AREA (M): 2.5 CM^2
BH CV ECHO MEAS - LVOT AREA: 2.5 CM^2
BH CV ECHO MEAS - LVOT DIAM: 1.8 CM
BH CV ECHO MEAS - LVPWD: 1 CM
BH CV ECHO MEAS - MED PEAK E' VEL: 7 CM/SEC
BH CV ECHO MEAS - MV A DUR: 0.1 SEC
BH CV ECHO MEAS - MV A MAX VEL: 107 CM/SEC
BH CV ECHO MEAS - MV DEC SLOPE: 662 CM/SEC^2
BH CV ECHO MEAS - MV DEC TIME: 0.09 SEC
BH CV ECHO MEAS - MV E MAX VEL: 68.1 CM/SEC
BH CV ECHO MEAS - MV E/A: 0.64
BH CV ECHO MEAS - MV MEAN PG: 3 MMHG
BH CV ECHO MEAS - MV P1/2T MAX VEL: 65.2 CM/SEC
BH CV ECHO MEAS - MV P1/2T: 28.8 MSEC
BH CV ECHO MEAS - MV V2 MEAN: 77.5 CM/SEC
BH CV ECHO MEAS - MV V2 VTI: 15.4 CM
BH CV ECHO MEAS - MVA P1/2T LCG: 3.4 CM^2
BH CV ECHO MEAS - MVA(P1/2T): 7.6 CM^2
BH CV ECHO MEAS - MVA(VTI): 3.3 CM^2
BH CV ECHO MEAS - PA ACC SLOPE: 1663 CM/SEC^2
BH CV ECHO MEAS - PA ACC TIME: 0.09 SEC
BH CV ECHO MEAS - PA MAX PG (FULL): 3.1 MMHG
BH CV ECHO MEAS - PA MAX PG: 8.9 MMHG
BH CV ECHO MEAS - PA PR(ACCEL): 38.5 MMHG
BH CV ECHO MEAS - PA V2 MAX: 149 CM/SEC
BH CV ECHO MEAS - PULM A REVS DUR: 0.12 SEC
BH CV ECHO MEAS - PULM A REVS VEL: 49.4 CM/SEC
BH CV ECHO MEAS - PULM DIAS VEL: 67.4 CM/SEC
BH CV ECHO MEAS - PULM S/D: 1.1
BH CV ECHO MEAS - PULM SYS VEL: 75.8 CM/SEC
BH CV ECHO MEAS - PVA(V,A): 2.3 CM^2
BH CV ECHO MEAS - PVA(V,D): 2.3 CM^2
BH CV ECHO MEAS - QP/QS: 0.86
BH CV ECHO MEAS - RAP SYSTOLE: 3 MMHG
BH CV ECHO MEAS - RV MAX PG: 5.8 MMHG
BH CV ECHO MEAS - RV MEAN PG: 3 MMHG
BH CV ECHO MEAS - RV V1 MAX: 120 CM/SEC
BH CV ECHO MEAS - RV V1 MEAN: 77.9 CM/SEC
BH CV ECHO MEAS - RV V1 VTI: 15.4 CM
BH CV ECHO MEAS - RVOT AREA: 2.8 CM^2
BH CV ECHO MEAS - RVOT DIAM: 1.9 CM
BH CV ECHO MEAS - RVSP: 37 MMHG
BH CV ECHO MEAS - SI(AO): 91.7 ML/M^2
BH CV ECHO MEAS - SI(CUBED): 27.3 ML/M^2
BH CV ECHO MEAS - SI(LVOT): 31.7 ML/M^2
BH CV ECHO MEAS - SI(MOD-SP2): 23.1 ML/M^2
BH CV ECHO MEAS - SI(MOD-SP4): 23.1 ML/M^2
BH CV ECHO MEAS - SI(TEICH): 27.3 ML/M^2
BH CV ECHO MEAS - SV(AO): 146.6 ML
BH CV ECHO MEAS - SV(CUBED): 43.7 ML
BH CV ECHO MEAS - SV(LVOT): 50.6 ML
BH CV ECHO MEAS - SV(MOD-SP2): 37 ML
BH CV ECHO MEAS - SV(MOD-SP4): 37 ML
BH CV ECHO MEAS - SV(RVOT): 43.7 ML
BH CV ECHO MEAS - SV(TEICH): 43.6 ML
BH CV ECHO MEAS - TAPSE (>1.6): 2 CM2
BH CV ECHO MEAS - TR MAX VEL: 295 CM/SEC
BH CV VAS BP RIGHT ARM: NORMAL MMHG
BH CV XLRA - RV BASE: 2.9 CM
BH CV XLRA - TDI S': 23 CM/SEC
BILIRUB SERPL-MCNC: 0.4 MG/DL (ref 0.1–1.2)
BUN BLD-MCNC: 26 MG/DL (ref 8–23)
BUN/CREAT SERPL: 25 (ref 7–25)
CALCIUM SPEC-SCNC: 6.8 MG/DL (ref 8.6–10.5)
CHLORIDE SERPL-SCNC: 105 MMOL/L (ref 98–107)
CO2 SERPL-SCNC: 20.6 MMOL/L (ref 22–29)
CREAT BLD-MCNC: 1.04 MG/DL (ref 0.57–1)
DEPRECATED RDW RBC AUTO: 54.6 FL (ref 37–54)
E/E' RATIO: 9
ERYTHROCYTE [DISTWIDTH] IN BLOOD BY AUTOMATED COUNT: 21.4 % (ref 11.7–13)
GFR SERPL CREATININE-BSD FRML MDRD: 65 ML/MIN/1.73
GLOBULIN UR ELPH-MCNC: 4.3 GM/DL
GLUCOSE BLD-MCNC: 73 MG/DL (ref 65–99)
HCT VFR BLD AUTO: 24.2 % (ref 35.6–45.5)
HGB BLD-MCNC: 7.4 G/DL (ref 11.9–15.5)
LEFT ATRIUM VOLUME INDEX: 23 ML/M2
LEFT ATRIUM VOLUME: 35 CM3
LV EF 2D ECHO EST: 67 %
MAXIMAL PREDICTED HEART RATE: 157 BPM
MCH RBC QN AUTO: 21.8 PG (ref 26.9–32)
MCHC RBC AUTO-ENTMCNC: 30.6 G/DL (ref 32.4–36.3)
MCV RBC AUTO: 71.4 FL (ref 80.5–98.2)
PLATELET # BLD AUTO: 98 10*3/MM3 (ref 140–500)
PMV BLD AUTO: ABNORMAL FL (ref 6–12)
POTASSIUM BLD-SCNC: 3.4 MMOL/L (ref 3.5–5.2)
PROT SERPL-MCNC: 5.7 G/DL (ref 6–8.5)
RBC # BLD AUTO: 3.39 10*6/MM3 (ref 3.9–5.2)
SODIUM BLD-SCNC: 137 MMOL/L (ref 136–145)
STRESS TARGET HR: 133 BPM
WBC NRBC COR # BLD: 3.39 10*3/MM3 (ref 4.5–10.7)

## 2018-01-23 PROCEDURE — 92610 EVALUATE SWALLOWING FUNCTION: CPT | Performed by: SPEECH-LANGUAGE PATHOLOGIST

## 2018-01-23 PROCEDURE — 93306 TTE W/DOPPLER COMPLETE: CPT

## 2018-01-23 PROCEDURE — 99222 1ST HOSP IP/OBS MODERATE 55: CPT | Performed by: INTERNAL MEDICINE

## 2018-01-23 PROCEDURE — 93306 TTE W/DOPPLER COMPLETE: CPT | Performed by: INTERNAL MEDICINE

## 2018-01-23 PROCEDURE — 25010000002 CEFEPIME PER 500 MG: Performed by: INTERNAL MEDICINE

## 2018-01-23 PROCEDURE — 99232 SBSQ HOSP IP/OBS MODERATE 35: CPT | Performed by: INTERNAL MEDICINE

## 2018-01-23 PROCEDURE — A9577 INJ MULTIHANCE: HCPCS | Performed by: HOSPITALIST

## 2018-01-23 PROCEDURE — 0 GADOBENATE DIMEGLUMINE 529 MG/ML SOLUTION: Performed by: HOSPITALIST

## 2018-01-23 PROCEDURE — 25010000002 VANCOMYCIN PER 500 MG: Performed by: HOSPITALIST

## 2018-01-23 PROCEDURE — 72157 MRI CHEST SPINE W/O & W/DYE: CPT

## 2018-01-23 PROCEDURE — 25010000002 ACYCLOVIR PER 5 MG: Performed by: INTERNAL MEDICINE

## 2018-01-23 PROCEDURE — 99233 SBSQ HOSP IP/OBS HIGH 50: CPT | Performed by: INTERNAL MEDICINE

## 2018-01-23 PROCEDURE — 85027 COMPLETE CBC AUTOMATED: CPT | Performed by: INTERNAL MEDICINE

## 2018-01-23 PROCEDURE — 70544 MR ANGIOGRAPHY HEAD W/O DYE: CPT

## 2018-01-23 PROCEDURE — 80053 COMPREHEN METABOLIC PANEL: CPT | Performed by: INTERNAL MEDICINE

## 2018-01-23 PROCEDURE — 72158 MRI LUMBAR SPINE W/O & W/DYE: CPT

## 2018-01-23 RX ORDER — ACETAMINOPHEN 325 MG/1
650 TABLET ORAL EVERY 4 HOURS PRN
Status: DISCONTINUED | OUTPATIENT
Start: 2018-01-23 | End: 2018-01-27 | Stop reason: HOSPADM

## 2018-01-23 RX ORDER — SODIUM CHLORIDE 0.9 % (FLUSH) 0.9 %
1-10 SYRINGE (ML) INJECTION AS NEEDED
Status: DISCONTINUED | OUTPATIENT
Start: 2018-01-23 | End: 2018-01-27 | Stop reason: HOSPADM

## 2018-01-23 RX ORDER — LIDOCAINE HYDROCHLORIDE 10 MG/ML
5 INJECTION, SOLUTION INFILTRATION; PERINEURAL ONCE
Status: COMPLETED | OUTPATIENT
Start: 2018-01-23 | End: 2018-01-23

## 2018-01-23 RX ORDER — ACETAMINOPHEN 650 MG/1
650 SUPPOSITORY RECTAL EVERY 4 HOURS PRN
Status: DISCONTINUED | OUTPATIENT
Start: 2018-01-23 | End: 2018-01-27 | Stop reason: HOSPADM

## 2018-01-23 RX ORDER — POTASSIUM CHLORIDE 750 MG/1
40 CAPSULE, EXTENDED RELEASE ORAL ONCE
Status: DISCONTINUED | OUTPATIENT
Start: 2018-01-23 | End: 2018-01-27 | Stop reason: HOSPADM

## 2018-01-23 RX ORDER — ONDANSETRON 2 MG/ML
4 INJECTION INTRAMUSCULAR; INTRAVENOUS EVERY 6 HOURS PRN
Status: DISCONTINUED | OUTPATIENT
Start: 2018-01-23 | End: 2018-01-27 | Stop reason: HOSPADM

## 2018-01-23 RX ORDER — ATORVASTATIN CALCIUM 80 MG/1
80 TABLET, FILM COATED ORAL DAILY
Status: DISCONTINUED | OUTPATIENT
Start: 2018-01-23 | End: 2018-01-23

## 2018-01-23 RX ADMIN — METOPROLOL TARTRATE 5 MG: 5 INJECTION, SOLUTION INTRAVENOUS at 03:15

## 2018-01-23 RX ADMIN — Medication 10 ML: at 09:00

## 2018-01-23 RX ADMIN — METOPROLOL TARTRATE 5 MG: 5 INJECTION, SOLUTION INTRAVENOUS at 16:07

## 2018-01-23 RX ADMIN — GADOBENATE DIMEGLUMINE 13 ML: 529 INJECTION, SOLUTION INTRAVENOUS at 23:30

## 2018-01-23 RX ADMIN — WATER 2 G: 1 INJECTION INTRAMUSCULAR; INTRAVENOUS; SUBCUTANEOUS at 05:42

## 2018-01-23 RX ADMIN — LIDOCAINE HYDROCHLORIDE 5 ML: 10 INJECTION, SOLUTION INFILTRATION; PERINEURAL at 15:51

## 2018-01-23 RX ADMIN — VANCOMYCIN HYDROCHLORIDE 1000 MG: 1 INJECTION, SOLUTION INTRAVENOUS at 16:07

## 2018-01-23 RX ADMIN — WATER 2 G: 1 INJECTION INTRAMUSCULAR; INTRAVENOUS; SUBCUTANEOUS at 18:48

## 2018-01-23 RX ADMIN — Medication 10 ML: at 21:24

## 2018-01-23 RX ADMIN — CYCLOSPORINE 1 DROP: 0.5 EMULSION OPHTHALMIC at 09:24

## 2018-01-23 RX ADMIN — METOPROLOL TARTRATE 5 MG: 5 INJECTION, SOLUTION INTRAVENOUS at 09:13

## 2018-01-23 RX ADMIN — SODIUM CHLORIDE 100 ML/HR: 9 INJECTION, SOLUTION INTRAVENOUS at 08:40

## 2018-01-23 RX ADMIN — PANTOPRAZOLE SODIUM 40 MG: 40 INJECTION, POWDER, FOR SOLUTION INTRAVENOUS at 09:08

## 2018-01-23 RX ADMIN — ACYCLOVIR SODIUM 620 MG: 1000 INJECTION, SOLUTION INTRAVENOUS at 01:52

## 2018-01-23 RX ADMIN — ACYCLOVIR SODIUM 620 MG: 1000 INJECTION, SOLUTION INTRAVENOUS at 16:14

## 2018-01-23 RX ADMIN — ACETAMINOPHEN 650 MG: 650 SUPPOSITORY RECTAL at 21:23

## 2018-01-23 RX ADMIN — CYCLOSPORINE 1 DROP: 0.5 EMULSION OPHTHALMIC at 21:22

## 2018-01-23 RX ADMIN — SODIUM CHLORIDE 75 ML/HR: 9 INJECTION, SOLUTION INTRAVENOUS at 21:22

## 2018-01-23 NOTE — PLAN OF CARE
Problem: Patient Care Overview (Adult)  Goal: Plan of Care Review  Outcome: Ongoing (interventions implemented as appropriate)   01/22/18 1557 01/23/18 0441   Coping/Psychosocial Response Interventions   Plan Of Care Reviewed With patient --    Patient Care Overview   Progress no change --    Outcome Evaluation   Outcome Summary/Follow up Plan --  Pt is very alert to voice, awakens easily, and makes eye contact appropriately but prefers to go back to sleep; she moans a lot when asked questions or when repositioned; she does not speak words for us, but moans yes or no to questions; pt also awakens when her name is spoken and she goes by her middle name: Christi; PICC and dressing clean, dry, intact; skin is hot to touch but legs are warm; pt is weak but can move legs and arms; respirations are shallow, clear and unlabored; heartrate remains tachycardic with very clear heart sounds ranging from 110 to 140; metoprolol IV administered slowly as ordered prn for HR > 100; pt denies chest pain and does not appear to be in distress; dayshift nurse Maria T HAYES helped pass medications for each night shift nurse, explaining that the shift had been hectic for them and she wanted to help before she left since we had 4 nurses; she administered medications crushed, but pt could not swallow the food and medication without falling asleep or coughing and was put on NPO order for speech to evaluate; pt receives oral suctioning via [employer]; daughter performs this without asking staff; daughter repositions pt without requesting help and also checks temperature via forehead thermometer;IV fluids at 100 mL/hr still running; she had also administered po Tylenol as ordered prn for fever, but this was not passed along; daughter and son questioned why she was having her temperature taken at 2300 instead of 2200; they were upset and then explained she had taken Tylenol, but because she barely swallowed any, she may not have received much of a  "dose; when reviewing chart, it was then noted that prn Tylenol was given at 2100; APAP suppository was ordered and administered by charge nurse; daughter requested a female nurse; temperature monitored closely and has remaind within normal limits since suppository; 99.2 and 98; EKG revealed Sinus Tachycardia; NIH very difficult to perform as pt was very lethargic and unwilling to cooperate; pt had been given temazepam for sleep by [employer]; pt had been given alprazolam before MRI, which had been ordered by Dr. Andrews for generalized weakness; daughter states that she has not been confused or lethargic until the last 24 hours; it was given in shift report that pt has been this way all day without change; daughter stated \"I'm trying to figure out what might have caused her SIRS (Systemic Inflammatory Response Syndrome) to flare\"; pt recently had Colitis with ischemia in December 2017, and has Rheumatoid Arthritis; she has been off her RA medications (such as Xeljanz) recently; MRI revealed probably infarct; Dr. Gil consulted and called many times until spoken with and is aware of situation; cardiology Dr. Grant consulted for persistent tachycardia; one unit of blood administered on 1- for Hmg of 6.6.     Goal: Adult Individualization and Mutuality  Outcome: Ongoing (interventions implemented as appropriate)    Goal: Discharge Needs Assessment  Outcome: Ongoing (interventions implemented as appropriate)      Problem: Infection, Risk/Actual (Adult)  Goal: Identify Related Risk Factors and Signs and Symptoms  Outcome: Ongoing (interventions implemented as appropriate)    Goal: Infection Prevention/Resolution  Outcome: Ongoing (interventions implemented as appropriate)      Problem: Fall Risk (Adult)  Goal: Absence of Falls  Outcome: Ongoing (interventions implemented as appropriate)      Problem: Pain, Acute (Adult)  Goal: Acceptable Pain Control/Comfort Level  Outcome: Ongoing (interventions " implemented as appropriate)      Problem: Pressure Ulcer Risk (Checo Scale) (Adult,Obstetrics,Pediatric)  Goal: Skin Integrity  Outcome: Ongoing (interventions implemented as appropriate)      Problem: Skin Integrity Impairment, Risk/Actual (Adult)  Goal: Identify Related Risk Factors and Signs and Symptoms  Outcome: Ongoing (interventions implemented as appropriate)    Goal: Skin Integrity/Wound Healing  Outcome: Ongoing (interventions implemented as appropriate)      Problem: Confusion, Acute (Adult)  Goal: Identify Related Risk Factors and Signs and Symptoms  Outcome: Ongoing (interventions implemented as appropriate)    Goal: Cognitive/Functional Impairments Minimized  Outcome: Ongoing (interventions implemented as appropriate)    Goal: Safety  Outcome: Ongoing (interventions implemented as appropriate)

## 2018-01-23 NOTE — PROGRESS NOTES
Called by RN on floor with results of MRI. Dr. Morales has already addressed and Dr. Mckeon (Stroke Specialist) was consulted. Pt is currently NPO with some concerns in chart regarding possible GIB (dark stool and low hgb) so ASA and statin likely not appropriate at present. I instructed RN to discuss this case with Dr. Mckeon ASAP. RN says they have a call out to him right now. MRI showing only tiny infarct,  not sure how much of a role this is playing in pt's current confusion. Pt's current situation sounds more consistent with possible encephalopathy due to CNS infection and fever as Dr. Andrews has mentioned. Her persistent sinus tachycardia is concerning. EKG reviewed. Will ask Cardiology to assist. BPs reassuring.

## 2018-01-23 NOTE — PROGRESS NOTES
Continued Stay Note  [facility]     Patient Name: Domitila Valera  MRN: [id number]  Today's Date: 1/23/2018    Admit Date: 1/19/2018          Discharge Plan       01/23/18 1315    Case Management/Social Work Plan    Plan CCP following for rehab vs home with Dulce STROUD.    Additional Comments Spoke with Tisha Way and she is following.  Call to Thelma/ Alex to follow.                Discharge Codes     None            Cynthia Toledo RN

## 2018-01-23 NOTE — NURSING NOTE
Dr. Mckeon called and information given-MRI results and LP CSF results and informed of change in mentation and original diagnosis of FUO and current temp of 100.3 and tylenol suppository given. Daughter @ bedside and had  already spoken to Mary   and Alirio. After speaking to MD, talked to daughter @ bedside and shared what MD said and that full Neuro  assessment would be done in am per Neurologist.  Daughter voices understanding.

## 2018-01-23 NOTE — SIGNIFICANT NOTE
01/23/18 1316   Rehab Treatment   Discipline occupational therapist   Rehab Evaluation   Evaluation Not Performed other (see comments)  (Pt with bedrest order. Noted possible lumbar puncture today. Discussed wt nurse. Follow up tomorrow for OT)

## 2018-01-23 NOTE — PROGRESS NOTES
"   LOS: 4 days   Patient Care Team:  Javier Soriano III, MD as PCP - General (Internal Medicine)    Chief Complaint/ Reason for encounter: Acute renal failure, hyponatremia        Subjective     Fever    Pertinent negatives include no chest pain or nausea.   Foot Pain   Associated symptoms include a fever and weakness. Pertinent negatives include no chest pain or nausea.       Subjective:  Symptoms:  Stable.  She reports weakness.  No shortness of breath or chest pain.  (Remains confused, low-grade fevers occasionally  Noted plans for repeat blood puncture).    Diet:  Poor intake.  No nausea.    Activity level: Impaired due to weakness.    Pain:  She reports no pain.          History taken from: Patient and chart    Objective     Vital Signs  Temp:  [98.1 °F (36.7 °C)-102.3 °F (39.1 °C)] 99.8 °F (37.7 °C)  Heart Rate:  [102-143] 124  Resp:  [16-20] 16  BP: (120-167)/(71-97) 167/97       Wt Readings from Last 1 Encounters:   01/23/18 0537 65 kg (143 lb 4.8 oz)   01/22/18 0500 61.7 kg (136 lb)   01/21/18 0737 63.3 kg (139 lb 8 oz)   01/20/18 0546 63.1 kg (139 lb 1.6 oz)   01/19/18 2243 61.6 kg (135 lb 14.4 oz)   01/19/18 1351 57.6 kg (127 lb)       Objective:  General Appearance:  Comfortable, ill-appearing, in no acute distress and not in pain (Thin, mildly cachectic, confused).    Vital signs: (most recent): Blood pressure 167/97, pulse (!) 124, temperature 99.8 °F (37.7 °C), temperature source Oral, resp. rate 16, height 149.9 cm (59\"), weight 65 kg (143 lb 4.8 oz), SpO2 95 %.  Vital signs are normal.  Fever.    Output: Producing urine.    HEENT: Normal HEENT exam.    Lungs:  Normal respiratory rate and normal effort.  She is not in respiratory distress.  Breath sounds clear to auscultation.  There are decreased breath sounds.  No wheezes or rales.    Heart: Normal rate.  Regular rhythm.    Abdomen: Abdomen is soft and non-distended.  Bowel sounds are normal.   There is no abdominal tenderness.  There is no " epigastric area or no suprapubic area tenderness.  There is no rebound tenderness.   There is no mass.   Extremities: Normal range of motion.  There is dependent edema.  There is no deformity.    Pulses: Distal pulses are intact.    Neurological: Patient is alert.    Skin:  Warm and dry.  There is a rash.  No ecchymosis or ulceration.             Results Review:    Past Medical History: reviewed and updated  Past Medical History:   Diagnosis Date   • Hypertension    • Ischemic colitis    • Rheumatoid arthritis    • Thyroid nodule          Allergies:  Allergies   Allergen Reactions   • Buffered Aspirin Rash and Shortness Of Breath   • Ace Inhibitors      cough   • Aurothioglucose Diarrhea     Increased AST & ALT   • Latex Itching   • Keflex [Cephalexin] Hives and Rash       Intake/Output:     Intake/Output Summary (Last 24 hours) at 01/23/18 1440  Last data filed at 01/23/18 0152   Gross per 24 hour   Intake             1080 ml   Output                0 ml   Net             1080 ml         DATA:  Interval chart, labs and notes reviewed.  The following labs personally reviewed by me.  Renal ultrasound personally reviewed, no obstruction  I personally reviewed her old records from [facility], baseline creatinine 1.4  Discussed with her son at bedside, interval history obtained from him as well regarding recent health and complaints    Labs:   Recent Results (from the past 24 hour(s))   CBC (No Diff)    Collection Time: 01/23/18  5:42 AM   Result Value Ref Range    WBC 3.39 (L) 4.50 - 10.70 10*3/mm3    RBC 3.39 (L) 3.90 - 5.20 10*6/mm3    Hemoglobin 7.4 (L) 11.9 - 15.5 g/dL    Hematocrit 24.2 (L) 35.6 - 45.5 %    MCV 71.4 (L) 80.5 - 98.2 fL    MCH 21.8 (L) 26.9 - 32.0 pg    MCHC 30.6 (L) 32.4 - 36.3 g/dL    RDW 21.4 (H) 11.7 - 13.0 %    RDW-SD 54.6 (H) 37.0 - 54.0 fl    MPV  6.0 - 12.0 fL    Platelets 98 (L) 140 - 500 10*3/mm3   Comprehensive Metabolic Panel    Collection Time: 01/23/18  5:42 AM   Result Value Ref Range     Glucose 73 65 - 99 mg/dL    BUN 26 (H) 8 - 23 mg/dL    Creatinine 1.04 (H) 0.57 - 1.00 mg/dL    Sodium 137 136 - 145 mmol/L    Potassium 3.4 (L) 3.5 - 5.2 mmol/L    Chloride 105 98 - 107 mmol/L    CO2 20.6 (L) 22.0 - 29.0 mmol/L    Calcium 6.8 (L) 8.6 - 10.5 mg/dL    Total Protein 5.7 (L) 6.0 - 8.5 g/dL    Albumin 1.40 (L) 3.50 - 5.20 g/dL    ALT (SGPT) 14 1 - 33 U/L    AST (SGOT) 40 (H) 1 - 32 U/L    Alkaline Phosphatase 268 (H) 39 - 117 U/L    Total Bilirubin 0.4 0.1 - 1.2 mg/dL    eGFR  African Amer 65 >60 mL/min/1.73    Globulin 4.3 gm/dL    A/G Ratio 0.3 g/dL    BUN/Creatinine Ratio 25.0 7.0 - 25.0    Anion Gap 11.4 mmol/L       Radiology:  Imaging Results (last 24 hours)     Procedure Component Value Units Date/Time    MRI Brain With & Without Contrast [[id number]] Collected:  01/22/18 1942     Updated:  01/22/18 2055    Narrative:       MR SCAN OF THE BRAIN WITHOUT AND WITH INTRAVENOUS CONTRAST     HISTORY: Confusion. Generalized weakness.     TECHNIQUE: The MR scan was performed with sagittal, axial, and coronal  images and includes T1 images without and with intravenous contrast.      FINDINGS: There is mild diffuse atrophy and some minimal chronic small  vessel ischemic change. The diffusion sequence demonstrates a tiny focus  of increased signal in the right vertex in the posterior right frontal  region measuring 2 or 3 mm. This is suspicious for a tiny acute infarct.  There is no associated hemorrhage or abnormal enhancement or mass  effect. The remainder of the MR scan is unremarkable except for some  mild mucosal thickening scattered in the ethmoid air cells.     CONCLUSION: Mild diffuse atrophy and chronic small vessel ischemic  change. Probable tiny acute infarct in the right vertex in the posterior  right frontal region measuring 2 or 3 mm as seen on the diffusion  sequence.     This report was finalized on 1/22/2018 8:52 PM by Dr. Willie Newton MD.                Medications have been  reviewed:  Current Facility-Administered Medications   Medication Dose Route Frequency Provider Last Rate Last Dose   • acetaminophen (TYLENOL) suppository 650 mg  650 mg Rectal Q4H PRN Raad Adams MD   650 mg at 01/22/18 2359   • acetaminophen (TYLENOL) tablet 650 mg  650 mg Oral Q4H PRN RAMANDEEP Pedraza        Or   • acetaminophen (TYLENOL) suppository 650 mg  650 mg Rectal Q4H PRN RAMANDEEP Pedraza       • acetaminophen (TYLENOL) tablet 650 mg  650 mg Oral Q6H PRN Hugo Elam MD   650 mg at 01/22/18 2051   • acyclovir (ZOVIRAX) 620 mg in sodium chloride 0.9 % 100 mL IVPB  10 mg/kg Intravenous Q12H Dylon Andrews MD   620 mg at 01/23/18 0152   • amLODIPine (NORVASC) tablet 5 mg  5 mg Oral Q24H Elie Morales MD   5 mg at 01/22/18 0832   • ceFEPime (MAXIPIME) in SWFI 2g/10ml IV PUSH syringe  2 g Intravenous Q12H Dylon Andrews MD   2 g at 01/23/18 0542   • cycloSPORINE (RESTASIS) 0.05 % ophthalmic emulsion 1 drop  1 drop Both Eyes BID Hugo Elam MD   1 drop at 01/23/18 0924   • dextrose (D50W) solution 50 mL  50 mL Intravenous Q1H PRN Vinayak Feliciano MD   50 mL at 01/20/18 0003   • estradiol (ESTRACE) tablet 1 mg  1 mg Oral Daily Hugo Elam MD   1 mg at 01/22/18 0832   • Ferrex 150 forte plus capsule 1 capsule  1 capsule Oral Daily With Breakfast Hugo Elam MD   1 capsule at 01/22/18 0832   • ferrous sulfate tablet 325 mg  325 mg Oral BID With Meals Hugo Elam MD   325 mg at 01/22/18 0832   • gabapentin (NEURONTIN) capsule 200 mg  200 mg Oral Q12H Hugo Elam MD   200 mg at 01/22/18 2051   • Hold medication  1 each Does not apply Continuous PRN Dylon Andrews MD       • hydrALAZINE (APRESOLINE) injection 10 mg  10 mg Intravenous Q6H PRN Elie Morales MD       • HYDROcodone-acetaminophen (NORCO) 5-325 MG per tablet 1 tablet  1 tablet Oral Q6H PRN Hugo Elam MD   1 tablet at 01/22/18 0625   • lidocaine  (XYLOCAINE) 1 % injection 5 mL  5 mL Infiltration Once Enmanuel Welch MD       • metoprolol tartrate (LOPRESSOR) injection 5 mg  5 mg Intravenous Q6H PRN Elie Morales MD   5 mg at 01/23/18 0913   • ondansetron (ZOFRAN) injection 4 mg  4 mg Intravenous Q6H PRN Triciabeau Albarran, APRN       • pantoprazole (PROTONIX) injection 40 mg  40 mg Intravenous BID AC Elie Morales MD   40 mg at 01/23/18 0908   • Pharmacy to Dose acyclovir (ZOVIRAX)   Does not apply Continuous PRN Dylon Andrews MD       • Pharmacy to dose vancomycin   Does not apply Continuous PRN Dylon Andrews MD       • potassium chloride (MICRO-K) CR capsule 40 mEq  40 mEq Oral Once iKp Roberson MD       • sodium chloride 0.9 % flush 1-10 mL  1-10 mL Intravenous PRN Tricia Albarran, APRN       • sodium chloride 0.9 % flush 10 mL  10 mL Intracatheter Q12H Raad Adams MD   10 mL at 01/22/18 2052   • sodium chloride 0.9 % flush 10 mL  10 mL Intracatheter PRN Raad Adams MD       • sodium chloride 0.9 % infusion  75 mL/hr Intravenous Continuous Kip Roberson  mL/hr at 01/23/18 0840 100 mL/hr at 01/23/18 0840   • temazepam (RESTORIL) capsule 15 mg  15 mg Oral Nightly PRN Hugo Elam MD   15 mg at 01/22/18 2051   • vancomycin (VANCOCIN) in iso-osmotic dextrose IVPB 1 g (premix) 200 mL  1,000 mg Intravenous Q24H Elie Morales MD   1,000 mg at 01/22/18 1217       Assessment/Plan     Principal Problem:    FUO (fever of unknown origin)  Active Problems:    Hyperkalemia    Hyponatremia    JEFFERY (acute kidney injury)    Anemia    GI bleed    Cellulitis      Assessment:  (Assesment  JEFFERY secondary to decreased intravascular volume and sespsis secondary to cellulitis , stable    Chronic kidney disease, recent baseline creatinine around 1.4, below prior baseline     HYperkalemia , improved     Hyponatremia , hypovolemic, better today with fluids     LE cellulitis     Metabolic acidosis, improved after  bicarbonate drip     RA     HTN         Plan:  Renal function remains stable, slowly improved  Volume and electrolytes stable today  Noted plans for repeat lumbar puncture  Encourage fluid intake once diet is restarted  Continue fluids until diet is restarted as well   ).             Continue to monitor renal function, electroytes and volume closely   Please call me with any questions or concerns      Kip Roberson MD   [facility]   [phone]    01/23/18  2:40 PM      Dictation performed using Dragon dictation software

## 2018-01-23 NOTE — PROGRESS NOTES
"DAILY PROGRESS NOTE  [facility]    Patient Identification:  Name: Domitila Valera  Age: 62 y.o.  Sex: female  :  1955  MRN: [id number]         Primary Care Physician: Javier Soriano III, MD    Subjective:  Interval History:She complains of pain in feet.  She is more confused.    Objective:    Scheduled Meds:    acyclovir 10 mg/kg Intravenous Q12H   amLODIPine 5 mg Oral Q24H   cefepime 2 g Intravenous Q12H   cycloSPORINE 1 drop Both Eyes BID   estradiol 1 mg Oral Daily   Ferrex 150 forte plus 1 capsule Oral Daily With Breakfast   ferrous sulfate 325 mg Oral BID With Meals   gabapentin 200 mg Oral Q12H   lidocaine 5 mL Infiltration Once   pantoprazole 40 mg Intravenous BID AC   sodium chloride 10 mL Intracatheter Q12H   vancomycin 1,000 mg Intravenous Q24H     Continuous Infusions:    hold 1 each    Pharmacy to Dose acyclovir (ZOVIRAX)     Pharmacy to dose vancomycin     sodium chloride 100 mL/hr Last Rate: 100 mL/hr (18 0840)       Vital signs in last 24 hours:  Temp:  [98.1 °F (36.7 °C)-102.3 °F (39.1 °C)] 98.1 °F (36.7 °C)  Heart Rate:  [102-143] 116  Resp:  [16-20] 16  BP: (120-154)/(71-89) 123/78    Intake/Output:    Intake/Output Summary (Last 24 hours) at 18 1231  Last data filed at 18 0152   Gross per 24 hour   Intake             1080 ml   Output                0 ml   Net             1080 ml       Exam:  /78 (BP Location: Left arm, Patient Position: Sitting)  Pulse 116  Temp 98.1 °F (36.7 °C) (Oral)   Resp 16  Ht 149.9 cm (59\")  Wt 65 kg (143 lb 4.8 oz)  SpO2 96%  BMI 28.94 kg/m2    General Appearance:    Alert, cooperative, no distress   Head:    Normocephalic, without obvious abnormality, atraumatic   Eyes:       Throat:   Lips, tongue, gums normal   Neck:   Supple, symmetrical, trachea midline, no JVD   Lungs:     Clear to auscultation bilaterally, respirations unlabored   Chest Wall:    No tenderness or deformity    Heart:    Regular rate and rhythm, S1 " and S2 normal, no murmur,no  Rub or gallop   Abdomen:     Soft, non-tender, bowel sounds active, no masses, no organomegaly    Extremities:   Extremities normal, atraumatic, no cyanosis or edema, cellulitis in lower extremity.   Pulses:      Skin:   Skin is warm and dry,  no rashes or palpable lesions   Neurologic:   no focal deficits noted      [unfilled]  Data Review:    Results from last 7 days  Lab Units 01/23/18  0542 01/22/18  0455 01/21/18  0541   SODIUM mmol/L 137 134* 135*   POTASSIUM mmol/L 3.4* 3.7 4.1   CHLORIDE mmol/L 105 98 97*   CO2 mmol/L 20.6* 21.7* 25.4   BUN mg/dL 26* 32* 34*   CREATININE mg/dL 1.04* 1.13* 1.14*   GLUCOSE mg/dL 73 78 80   CALCIUM mg/dL 6.8* 7.7* 7.7*       Results from last 7 days  Lab Units 01/23/18  0542 01/22/18 0455 01/21/18  1937 01/21/18  0541   WBC 10*3/mm3 3.39* 3.85*  --  5.23   HEMOGLOBIN g/dL 7.4* 8.0* 8.0* 6.6*   HEMATOCRIT % 24.2* 26.1* 25.4* 21.9*   PLATELETS 10*3/mm3 98* 120*  --  121*             No results found for: TROPONINT        Results from last 7 days  Lab Units 01/23/18  0542 01/22/18  0455 01/21/18  0541   ALK PHOS U/L 268* 346* 392*   BILIRUBIN mg/dL 0.4 0.4 0.4   ALT (SGPT) U/L 14 19 20   AST (SGOT) U/L 40* 56* 57*             No results found for: POCGLU        Patient Active Problem List   Diagnosis Code   • FUO (fever of unknown origin) R50.9   • Hyperkalemia E87.5   • Benign essential HTN I10   • Cellulitis of right upper arm L03.113   • Hyponatremia E87.1   • JEFFERY (acute kidney injury) N17.9   • Anemia D64.9   • GI bleed K92.2   • Cellulitis L03.90       Assessment:  Active Hospital Problems (** Indicates Principal Problem)    Diagnosis Date Noted   • **FUO (fever of unknown origin) [R50.9] 01/19/2018   • Cellulitis [L03.90] 01/22/2018   • GI bleed [K92.2] 01/21/2018   • Anemia [D64.9] 01/20/2018   • Hyperkalemia [E87.5] 01/19/2018   • Hyponatremia [E87.1] 01/19/2018   • JEFFERY (acute kidney injury) [N17.9] 01/19/2018      Resolved Hospital Problems     Diagnosis Date Noted Date Resolved   No resolved problems to display.       Plan:  ID consult noted.Neuro and cardiology consults noted  Work up in progress  plans and antibiotics per  ID.    Protonix IV    Elie Morales MD  1/23/2018  12:31 PM

## 2018-01-23 NOTE — PROGRESS NOTES
Neuro    I attempted an LP.  I got into CSF but it was bloody and would add nothing to last LP.  Tried it at higher level with same result.    Could be the second traumatic tap or blood into CSF at time of first LP.     Doubt SAH but will check MRA of head to r/o aneurysm.    Will also check MRI of L/S spine and T-spine given earlier pain there to r/o infectous source and whether there could be any hematoma that we are passing the needle through but I doubt it.    Enmanuel Welch MD

## 2018-01-23 NOTE — CONSULTS
Date of Consultation: 18    Referral Provider: Hugo Elam MD     Reason for Consultation: Sinus tachycardia, CVA    Encounter Provider: Sarbjit Manzanares MD    Group of Service: [facility]     Patient Name: Domitila Valera    :1955    Chief complaint: Fever, altered mental status      History of Present Illness: Ms. Domitila Valera is a 62 year old female patient of [facility]. She has a history of HTN, rheumatoid arthritis (was on Xeljanz), thyroid disease, cervical spondylosis, anemia, and gout. She has a familial history of premature heart disease. She had a Holter Monitor in  which showed predominant rhythm as sinus - one episode of palpitations (NSR) with 37 PVCs and 2 PACs  (average HR: 72, min HR: 56, max HR: 102). She had a normal PET in .     She was last seen in the [facility] office on 17 for f/u dyspnea with exertion and at rest. She reported that her BP had been around 160/90 before taking blood pressure medication in the morning. She denied chest pain or worsening edema. She presented in sinus rhythm heart rate 60 with first-degree AV block and /70. Her ECHO had shown evidence of pulmonary hypertension - right and left heart cath was discussed and she wished to proceed (never scheduled). The plan was to change time of antihypertensive therapy - atenolol/chlorthalidone in the mornings and amlodipine/valsartan in the evenings.     She was recently hospitalized at [facility] for sharp abdominal pain with loose stools and SOB. CXR showed mild intersitial edema. CTA chest showed no PE - BLE doppler negative for DVT and BUE negative for DVT. She had an ECHO at this time which showed mild mitral regurgitation and mild pulmonary HTN with an EF of 66%. She had a bilateral renal US which showed no hydronephrosis. CT was suggestive of either ischemic colitis or infectious colitis - she was treated with antibiotics.    She presented to the ED with the c/o BLE  edema with fever 101.2. She reported seeing her rheumatologist in 2017 and there was concern for cellulitis - she was given prednisone and was told to go to the ER if she had a fever. Labs included Cr 1.7 (baseline - 1.2), BUN 59, Potassium 6, AST 78, lactate 2.1 and Hbg 8.1. She was started on meropenem and vancomycin. She has been started on steroids and Xeljanz treatments. CT chest showed small bilateral pleural effusions. LP did not show bacterial meningitis - however HSV infection suspected. It was documented that she has been confused and fever is still present. MRI brain showed tiny infarct. Patient was found to have dark BM - ASA and statin were stopped, she has received PRBC .     We were consulted for tachycardia - her HR has ranged between 100-125 since admission. She is receiving metoprolol IV 5 mg PRN for HR >100. Most recent vitals include  and /71. She is receiving norvasc 5 mg daily. Potassium is 3.4 and magnesium was 1.9 ().      Previous Testing:    Bilateral Renal US 2018        ECHO 1/10/2018      Holter Monitor 2014      PET 2013      ECHO 2013      Stress Test 2013      Past Medical History:   Diagnosis Date   • Hypertension    • Ischemic colitis    • Rheumatoid arthritis    • Thyroid nodule          Past Surgical History:   Procedure Laterality Date   • ANKLE SURGERY Right    •  SECTION     • HYSTERECTOMY     • JOINT REPLACEMENT Bilateral     knee         Allergies   Allergen Reactions   • Buffered Aspirin Rash and Shortness Of Breath   • Ace Inhibitors      cough   • Aurothioglucose Diarrhea     Increased AST & ALT   • Latex Itching   • Keflex [Cephalexin] Hives and Rash         No current facility-administered medications on file prior to encounter.      No current outpatient prescriptions on file prior to encounter.         Social History     Social History   • Marital status:      Spouse name: N/A   • Number of children: N/A  "  • Years of education: N/A     Occupational History   • Not on file.     Social History Main Topics   • Smoking status: Never Smoker   • Smokeless tobacco: Not on file   • Alcohol use No   • Drug use: Defer   • Sexual activity: Defer     Other Topics Concern   • Not on file     Social History Narrative   • No narrative on file         History reviewed. No pertinent family history.      REVIEW OF SYSTEMS:   The patient is confused, and a review of systems could not be obtained.    Objective:     Vitals:    01/23/18 0503 01/23/18 0537 01/23/18 0605 01/23/18 0700   BP:    123/78   BP Location:    Left arm   Patient Position:    Sitting   Pulse: 111 112  116   Resp:    16   Temp:   99.9 °F (37.7 °C) 98.1 °F (36.7 °C)   TempSrc:   Oral Oral   SpO2: 96% 95%  96%   Weight:  65 kg (143 lb 4.8 oz)     Height:         Body mass index is 28.94 kg/(m^2).  Flowsheet Rows         First Filed Value    Admission Height  149.9 cm (59\") Documented at 01/19/2018 1351    Admission Weight  57.6 kg (127 lb) Documented at 01/19/2018 1351           General:    Confused, arouses                    Head:    Normocephalic, atraumatic.   Eyes:          Conjunctivae and sclerae normal, no icterus, PERRLA   Throat:   No oral lesions, no thrush, oral mucosa moist.    Neck:   Supple, trachea midline.   Lungs:     Clear to auscultation bilaterally     Heart:    Regular rhythm and tachy rate.  No murmurs, gallops, or rubs noted.   Abdomen:     Soft, non-tender, non-distended, positive bowel sounds.    Extremities:   1+ edema.     Pulses:   Pulses palpable and equal bilaterally.    Skin:   No bleeding or rash.   Neuro:   Altered mental status    Psychiatric:   Confused      Lab Review:                  Results from last 7 days  Lab Units 01/23/18  0542   SODIUM mmol/L 137   POTASSIUM mmol/L 3.4*   CHLORIDE mmol/L 105   CO2 mmol/L 20.6*   BUN mg/dL 26*   CREATININE mg/dL 1.04*   GLUCOSE mg/dL 73   CALCIUM mg/dL 6.8*           Results from last 7 " days  Lab Units 01/23/18  0542   WBC 10*3/mm3 3.39*   HEMOGLOBIN g/dL 7.4*   HEMATOCRIT % 24.2*   PLATELETS 10*3/mm3 98*               Results from last 7 days  Lab Units 01/21/18  1937   MAGNESIUM mg/dL 1.9       EKG 1/22/2018      EKG 1/21/2018      EKG (reviewed by me personally):      Assessment:   1. Fever of unknown origin  2. Possible lingular and ADDISON pneumonia  3. Acute CVA posterior right frontal region  4. Altered mental status (TME)  5. Melena (GI bleed)  6. Acute kidney injury  7. Leukopenia   8. Rheumatoid arthritis   9. Recent ischemic colitis   10. Sinus tachycardia     Plan:       The patient's overall clinical picture is very complicated.  Her lumbar puncture did not show any bacterial meningitis, although there is suspicion for HSV infection.  She is being treated empirically for this, as well as empirically for possible pneumonia on her CT scan.  A neurology consult is pending, and she did have a possible acute CVA on her MRI of the brain.  She also has evidence of a likely GI bleed at this point, and her aspirin has been held.  Given the anemia, recurrent fever, and her overall clinical picture, the sinus tachycardia is not unexpected.  However, given that she may have had an acute CVA, and has a fever of unknown origin, I'm going to repeat an echocardiogram with bubble study at this point.  She did recently have an echocardiogram at [facility] while she had colitis, although given her change in clinical status, I feel another echo is indicated at this point.  We will continue to follow the patient with you, and make recommendations accordingly.  Thank you very much for this consult.    Redd Manzanares M.D.

## 2018-01-23 NOTE — PLAN OF CARE
Problem: Inpatient SLP  Goal: Dysphagia- Patient will safely consume diet as per recommendation with no signs/symptoms of aspiration  Outcome: Ongoing (interventions implemented as appropriate)   01/23/18 0910   Safely Consume Diet   Safely Consume Diet- SLP, Date Established 01/23/18   Safely Consume Diet- SLP, Time to Achieve by discharge   Safely Consume Diet- SLP, Outcome goal ongoing

## 2018-01-23 NOTE — CONSULTS
NEUROLOGY CONSULTATION        PATIENT Domitila Valera    1955   MRN [id number]   ADMIT DATE 2018   LENGTH OF STAY 4 days   ATTENDING Elie Morales MD       HISTORY OF PRESENT ILLNESS:  This is a 62-year-old woman who is admitted for fever.  She had recent admissions to surrounding hospitals and was diagnosed as rheumatoid arthritis and colitis.  She was on cell exam in the past but was stopped however couple weeks ago her rheumatologist restarted it and also put her on low-dose prednisone.    The son says that when she came to the hospital this time that she had a fever which was of main reason they brought her.  However she was perhaps a little mixed up just on the exact details of things over the last week.  However over the last 48 hours she does become less responsive and is not carrying on any conversation.  No focal weakness was seen there is been no visual bulbar sphincter complaints.  She did have some pain in the lower thoracic/upper lumbar region over the last few days prior to admission.  She did not complain of pain radiating down the limbs.  However for the week prior to his admission she had no complaints of back pain.  The patient presently voices no complaints given her encephalopathy.    CHIEF COMPLAINT: Fever (last tyl 1145) and Foot Pain (r/t arthritis)      DIAGNOSIS: FUO (fever of unknown origin) [R50.9]      PAST MEDICAL HISTORY:   Past Medical History:   Diagnosis Date   • Hypertension    • Ischemic colitis    • Rheumatoid arthritis    • Thyroid nodule        PAST SURGICAL HISTORY:  Past Surgical History:   Procedure Laterality Date   • ANKLE SURGERY Right    •  SECTION     • HYSTERECTOMY     • JOINT REPLACEMENT Bilateral     knee       CURRENT MEDICATIONS:  Scheduled Medications:  acyclovir 10 mg/kg Intravenous Q12H   amLODIPine 5 mg Oral Q24H   cefepime 2 g Intravenous Q12H   cycloSPORINE 1 drop Both Eyes BID   estradiol 1 mg Oral Daily   Ferrex 150 forte plus 1 capsule  "Oral Daily With Breakfast   ferrous sulfate 325 mg Oral BID With Meals   gabapentin 200 mg Oral Q12H   pantoprazole 40 mg Intravenous BID AC   sodium chloride 10 mL Intracatheter Q12H   vancomycin 1,000 mg Intravenous Q24H     Infusions:   hold 1 each    Pharmacy to Dose acyclovir (ZOVIRAX)     Pharmacy to dose vancomycin     sodium chloride 100 mL/hr Last Rate: 100 mL/hr (01/23/18 0840)     PRN Medications:  •  acetaminophen  •  acetaminophen **OR** acetaminophen  •  acetaminophen  •  dextrose  •  hold  •  hydrALAZINE  •  HYDROcodone-acetaminophen  •  metoprolol tartrate  •  ondansetron  •  Pharmacy to Dose acyclovir (ZOVIRAX)  •  Pharmacy to dose vancomycin  •  sodium chloride  •  sodium chloride  •  temazepam    SOCIAL HISTORY:  Social History     Social History   • Marital status:      Spouse name: N/A   • Number of children: N/A   • Years of education: N/A     Social History Main Topics   • Smoking status: Never Smoker   • Smokeless tobacco: None   • Alcohol use No   • Drug use: Defer   • Sexual activity: Defer     Other Topics Concern   • None     Social History Narrative   • None       FAMILY HISTORY:   I have reviewed this patient's family history and it is not significant to the present illness.      REVIEW OF SYSTEMS: 14 system review performed and all other systems are negative except for Fever (last tyl 1145) and Foot Pain (r/t arthritis)         PHYSICAL EXAM:  GENERAL: Reveals a man/woman who appears his/her stated age, in no acute distress.  VITAL SIGNS: /78 (BP Location: Left arm, Patient Position: Sitting)  Pulse 116  Temp 98.1 °F (36.7 °C) (Oral)   Resp 16  Ht 149.9 cm (59\")  Wt 65 kg (143 lb 4.8 oz)  SpO2 96%  BMI 28.94 kg/m2  NECK: Carotids are equal without bruits.   HEART: Regular rate and rhythm with no significant murmur or gallop.   EXTREMITIES: Nonedematous. Pulses are intact. There is no rash. There is no hepatosplenomegaly. No respiratory distress. There is no " lymphadenopathy. There are no signs of cutaneous embolization.  NEUROLOGIC: On exam reveals a woman lying in bed with her eyes closed in no acute distress.  By stimulator she opens her eyes but then closes them again in about 5 seconds or less.  I didn't get her to follow a few commands in a delayed fashion in particular I got her to  both arms and to stick out her tongue.  Concerning the tongue I was beyond that complaints starting to ask her about her arms when she eventually stuck her tongue out.  Pupils are equal and reactive at about 4 mm horizontal eye movements are full she would not look up or down deformity.  Corneal reflexes are equal she grimaces bilaterally to supraorbital pain.  She did protrude her tongue in the midline.  His uvula in the middle midline.  Moves all 4 extremities to painful stimulation about equally she doesn't cooperate for individual muscle testing but I don't think there is any focal weakness reflexes are 1 and symmetric in the upper extremities and knees ankle jerks are absent both toes are downgoing sensations cerebellar exam and gait cannot be well assessed.  Her neck is supple.    DIAGNOSTIC DATA:   Labs reviewed.  She has a mild degree of renal failure but not enough to cause encephalopathy.    Her white count was about 6000 when she came in this struck down to 3.8 thousand.platelet count 98 K and crit 24.    CSF showed 11 white cells with 1600 red cells with normal glucose and protein.  There was only a few cc of fluid so full PCR panel was not sent but PCR for herpes simplex was sent though it will come back for quite a while.    Radiology images personally reviewed.  Specifically there is a very small spots in the right frontal lobe that could be an acute infarct though could also be T2 shine through it.  Other abnormalities are seen on the MRI.      IMPRESSION: Patient is clearly encephalopathic.  Though she is being treated with antibiotics I discussed it with the  infectious disease consultant have a well-defined infection to explain this fever.  The fevers coming down over the last 24 hours but we must assume she has some infectious process.  This small stroke does not explain her cognitive function.  It could be a reflection of a CNS infection such as herpes zoster or tuberculosis/aspergillosis that is causing the encephalopathy but we surely have no firm evidence of any of these conditions.  It could also be a sign of a endocarditis and she does have knee replacements and has not been taking prophylactic antibiotics.  Dr. Leighton Fenton for cardiology and echo is planned in this regard.  We could discuss the need for transesophageal echo at a later date.    From a neurologic perspective of concerned about these 11 white cells.  There 1600 red cells we still have to conclude that she has a very mild pleocytosis per.  She is somewhat immunosuppressed which could reduce the pleocytosis.  She is already on acyclovir.  However I think we should repeat the lumbar puncture and try to get a nontraumatic tap with a higher volume of CSF.  I plan to try to do this at the bedside later today.    She did complain of some back pain 10-14 days ago but she hasn't had any in the week prior to admission or in the hospital.  Therefore I don't think imaging her thoracic/lumbar spine is required but will keep in mind.      Thank you for allowing me to see this patient.    Enmanuel Welch MD  1/23/2018  11:16 AM

## 2018-01-23 NOTE — PROGRESS NOTES
[facility]     Inpatient Follow Up Note    Patient Identification:  Name: Domitila Valera  Age: 62 y.o.  Sex: female  :  1955  MRN: [id number]    Information from:patient, family and caregiver    Chief Complaint   Patient presents with   • Fever     last tyl 1145   • Foot Pain     r/t arthritis       History:   Remains toxic in appearance. 3 stools today, nonbloody. No particular abdominal discomfort.     Review of Systems:  Constitutional:  Weak  Cardiovascular:  Negative   Respiratory:  Negative             Problem List:  Patient Active Problem List    Diagnosis   • Cellulitis [L03.90]   • GI bleed [K92.2]   • Anemia [D64.9]   • FUO (fever of unknown origin) [R50.9]   • Hyperkalemia [E87.5]   • Hyponatremia [E87.1]   • JEFFERY (acute kidney injury) [N17.9]   • Cellulitis of right upper arm [L03.113]   • Benign essential HTN [I10]     Current Meds:  MAR Reviewed  Scheduled Meds:  acyclovir 10 mg/kg Intravenous Q12H   amLODIPine 5 mg Oral Q24H   cefepime 2 g Intravenous Q12H   cycloSPORINE 1 drop Both Eyes BID   estradiol 1 mg Oral Daily   Ferrex 150 forte plus 1 capsule Oral Daily With Breakfast   ferrous sulfate 325 mg Oral BID With Meals   gabapentin 200 mg Oral Q12H   pantoprazole 40 mg Intravenous BID AC   potassium chloride 40 mEq Oral Once   sodium chloride 10 mL Intracatheter Q12H   vancomycin 1,000 mg Intravenous Q24H     Continuous Infusions:  hold 1 each    Pharmacy to Dose acyclovir (ZOVIRAX)     Pharmacy to dose vancomycin     sodium chloride 75 mL/hr Last Rate: 75 mL/hr (18 1618)     PRN Meds:.•  acetaminophen  •  acetaminophen **OR** acetaminophen  •  acetaminophen  •  dextrose  •  hold  •  hydrALAZINE  •  HYDROcodone-acetaminophen  •  metoprolol tartrate  •  ondansetron  •  Pharmacy to Dose acyclovir (ZOVIRAX)  •  Pharmacy to dose vancomycin  •  sodium chloride  •  sodium chloride  •  temazepam  Allergies:  Allergies   Allergen Reactions   • Buffered Aspirin  "Rash and Shortness Of Breath   • Ace Inhibitors      cough   • Aurothioglucose Diarrhea     Increased AST & ALT   • Latex Itching   • Keflex [Cephalexin] Hives and Rash       Intake/Output:     Intake/Output Summary (Last 24 hours) at 18 1748  Last data filed at 18 0152   Gross per 24 hour   Intake             1080 ml   Output                0 ml   Net             1080 ml     New allergies/reactions:  None    Physical Exam:  Vitals:   Temp (24hrs), Av.9 °F (37.7 °C), Min:98.1 °F (36.7 °C), Max:102.3 °F (39.1 °C)    Temp:  [98.1 °F (36.7 °C)-102.3 °F (39.1 °C)] 99.8 °F (37.7 °C)  Heart Rate:  [102-136] 124  Resp:  [16-20] 16  BP: (120-167)/(71-97) 167/97  /97 (BP Location: Left arm, Patient Position: Lying)  Pulse (!) 124  Temp 99.8 °F (37.7 °C) (Oral)   Resp 16  Ht 149.9 cm (59\")  Wt 65 kg (143 lb 4.8 oz)  SpO2 95%  BMI 28.94 kg/m2    Exam:  Toxic appearing  Otherwise no interval change.         DATA:  Radiology and Labs:   Recent Results (from the past 24 hour(s))   CBC (No Diff)    Collection Time: 18  5:42 AM   Result Value Ref Range    WBC 3.39 (L) 4.50 - 10.70 10*3/mm3    RBC 3.39 (L) 3.90 - 5.20 10*6/mm3    Hemoglobin 7.4 (L) 11.9 - 15.5 g/dL    Hematocrit 24.2 (L) 35.6 - 45.5 %    MCV 71.4 (L) 80.5 - 98.2 fL    MCH 21.8 (L) 26.9 - 32.0 pg    MCHC 30.6 (L) 32.4 - 36.3 g/dL    RDW 21.4 (H) 11.7 - 13.0 %    RDW-SD 54.6 (H) 37.0 - 54.0 fl    MPV  6.0 - 12.0 fL    Platelets 98 (L) 140 - 500 10*3/mm3   Comprehensive Metabolic Panel    Collection Time: 18  5:42 AM   Result Value Ref Range    Glucose 73 65 - 99 mg/dL    BUN 26 (H) 8 - 23 mg/dL    Creatinine 1.04 (H) 0.57 - 1.00 mg/dL    Sodium 137 136 - 145 mmol/L    Potassium 3.4 (L) 3.5 - 5.2 mmol/L    Chloride 105 98 - 107 mmol/L    CO2 20.6 (L) 22.0 - 29.0 mmol/L    Calcium 6.8 (L) 8.6 - 10.5 mg/dL    Total Protein 5.7 (L) 6.0 - 8.5 g/dL    Albumin 1.40 (L) 3.50 - 5.20 g/dL    ALT (SGPT) 14 1 - 33 U/L    AST (SGOT) 40 (H) " 1 - 32 U/L    Alkaline Phosphatase 268 (H) 39 - 117 U/L    Total Bilirubin 0.4 0.1 - 1.2 mg/dL    eGFR  African Amer 65 >60 mL/min/1.73    Globulin 4.3 gm/dL    A/G Ratio 0.3 g/dL    BUN/Creatinine Ratio 25.0 7.0 - 25.0    Anion Gap 11.4 mmol/L   Adult Transthoracic Echo Complete W/ Cont if Necessary Per Protocol    Collection Time: 01/23/18  4:58 PM   Result Value Ref Range    BSA 1.6 m^2    IVSd 1.1 cm    LVIDd 3.9 cm    LVIDs 2.5 cm    LVPWd 1.0 cm    IVS/LVPW 1.1     FS 35.9 %    EDV(Teich) 65.9 ml    ESV(Teich) 22.3 ml    EF(Teich) 66.1 %    EDV(cubed) 59.3 ml    ESV(cubed) 15.6 ml    EF(cubed) 73.7 %    LV mass(C)d 131.0 grams    LV mass(C)dI 81.9 grams/m^2    SV(Teich) 43.6 ml    SI(Teich) 27.3 ml/m^2    SV(cubed) 43.7 ml    SI(cubed) 27.3 ml/m^2    Ao root diam 2.9 cm    Ao root area 6.6 cm^2    ACS 1.8 cm    LA dimension 2.4 cm    LA/Ao 0.83     LVOT diam 1.8 cm    LVOT area 2.5 cm^2    LVOT area(traced) 2.5 cm^2    RVOT diam 1.9 cm    RVOT area 2.8 cm^2    LVLd ap4 7.4 cm    EDV(MOD-sp4) 55.0 ml    LVLs ap4 6.6 cm    ESV(MOD-sp4) 18.0 ml    EF(MOD-sp4) 67.3 %    LVLd ap2 7.7 cm    EDV(MOD-sp2) 61.0 ml    LVLs ap2 6.9 cm    ESV(MOD-sp2) 24.0 ml    EF(MOD-sp2) 60.7 %    SV(MOD-sp4) 37.0 ml    SI(MOD-sp4) 23.1 ml/m^2    SV(MOD-sp2) 37.0 ml    SI(MOD-sp2) 23.1 ml/m^2    Ao root area (BSA corrected) 1.8     Ao root area (BSA corrected) 60.7 ml/m^2    CONTRAST EF 4CH 67.3 ml/m^2    LV Diastolic corrected for BSA 34.4 ml/m^2    LV Systolic corrected for BSA 11.3 ml/m^2    MV A dur 0.1 sec    MV E max matthew 68.1 cm/sec    MV A max matthew 107.0 cm/sec    MV E/A 0.64     MV V2 mean 77.5 cm/sec    MV mean PG 3.0 mmHg    MV V2 VTI 15.4 cm    MVA(VTI) 3.3 cm^2    MV P1/2t max matthew 65.2 cm/sec    MV P1/2t 28.8 msec    MVA(P1/2t) 7.6 cm^2    MV dec slope 662.0 cm/sec^2    MV dec time 0.09 sec    Ao V2 mean 111.0 cm/sec    Ao mean PG 6.0 mmHg    Ao mean PG (full) 2.0 mmHg    Ao V2 VTI 22.2 cm    BRENT(I,A) 2.3 cm^2    BRENT(I,D)  2.3 cm^2    LV V1 mean PG 4.0 mmHg    LV V1 mean 91.5 cm/sec    LV V1 VTI 19.9 cm    SV(Ao) 146.6 ml    SI(Ao) 91.7 ml/m^2    SV(LVOT) 50.6 ml    SV(RVOT) 43.7 ml    SI(LVOT) 31.7 ml/m^2    PA V2 max 149.0 cm/sec    PA max PG 8.9 mmHg    PA max PG (full) 3.1 mmHg     CV ECHO LEODAN - PVA(V,A) 2.3 cm^2     CV ECHO LEODAN - PVA(V,D) 2.3 cm^2    PA acc slope 1663 cm/sec^2    PA acc time 0.09 sec    RV V1 max PG 5.8 mmHg    RV V1 mean PG 3.0 mmHg    RV V1 max 120.0 cm/sec    RV V1 mean 77.9 cm/sec    RV V1 VTI 15.4 cm    TR max larry 295.0 cm/sec    PA pr(Accel) 38.5 mmHg    Pulm Sys Larry 75.8 cm/sec    Pulm Moses Larry 67.4 cm/sec    Pulm S/D 1.1     Qp/Qs 0.86     Pulm A Revs Dur 0.12 sec    Pulm A Revs Larry 49.4 cm/sec    MVA P1/2T LCG 3.4 cm^2     CV ECHO LEODAN - BZI_BMI 28.9 kilograms/m^2     CV ECHO LEODAN - BSA(HAYCOCK) 1.7 m^2     CV ECHO LEODAN - BZI_METRIC_WEIGHT 64.9 kg     CV ECHO LEODAN - BZI_METRIC_HEIGHT 149.9 cm    Target HR (85%) 133 bpm    Max. Pred. HR (100%) 157 bpm     CV VAS BP RIGHT /97 mmHg    TDI S' 23.00 cm/sec    RV Base 2.90 cm    LA volume 35.0 cm3    E/E' ratio 9.0     LA Volume Index 23.0 mL/m2    Ao pk larry 164.0 cm/sec    Lat Peak E' Larry 9.0 cm/sec    LV V1 max 136.0 cm/sec    Med Peak E' Larry 7.00 cm/sec    RAP systole 3 mmHg    RVSP(TR) 37 mmHg    Ao max PG 11.0 mmHg    TAPSE (>1.6) 2.00 cm2    Echo EF Estimated 67 %       Assessment:   Problem List:   Principal Problem:    FUO (fever of unknown origin)  Active Problems:    Hyperkalemia    Hyponatremia    JEFFERY (acute kidney injury)    Anemia    GI bleed    Cellulitis    No active bleeding. ANA noted.     Plan:   Continue current management and careful monitoring.         Kip Lizarraga MD  [facility]  1/23/2018

## 2018-01-23 NOTE — THERAPY EVALUATION
"Acute Care - Speech Language Pathology   Swallow Initial Evaluation [facility]     Patient Name: Domitila Valera  : 1955  MRN: [id number]  Today's Date: 2018               Admit Date: 2018    SPEECH-LANGUAGE PATHOLOGY EVALUATION - SWALLOW  Subjective: The patient was seen on this date for a Clinical Swallow evaluation.  Pt required mod to max cues to alert and needed continued stimulation to maintain alertness.  Did not follow commands and perseverated with 'uh huh' to all questions.  Pt did name picture of chair appropriately 1/2 trials.  Significant history:  Admitted with fever of unknown origin w/ questioned GIB.  New diagnosis of \"probable tiny acute infarct in the right vertex in the posterior right frontal region\" 18.  Objective: Textures given included ice.  Assessment: Difficulties were noted with ice.  Pt required mod to max cues to take single ice chip into mouth.  No oral manipulation of ice chip with loss of melted ice chip on right side.  Swallow reflex was noted following melting with reduced laryngeal elevation.  Further presentations deferred due to lethargy.  Alertness improved slightly with increased stimulation of NIH; however, not consistent and did not feel appropriate for further presentations.  Observations:  anterior loss and reduced oral control  SLP Findings:  Patient presents with suspected oropharyngeal dysphagia, without esophageal component.  Suspect alertness and cognition impacting swallow.  Recommendations: Diet Textures: NPO.  Medications should be taken by alternate means.   Recommended Strategies:  Oral care PRN.  Other Recommended Evaluations: Re-evaluation at bedside          Visit Dx:     ICD-10-CM ICD-9-CM   1. FUO (fever of unknown origin) R50.9 780.60     Patient Active Problem List   Diagnosis   • FUO (fever of unknown origin)   • Hyperkalemia   • Benign essential HTN   • Cellulitis of right upper arm   • Hyponatremia   • JEFFERY (acute kidney injury) "   • Anemia   • GI bleed   • Cellulitis     Past Medical History:   Diagnosis Date   • Hypertension    • Ischemic colitis    • Rheumatoid arthritis    • Thyroid nodule      Past Surgical History:   Procedure Laterality Date   • ANKLE SURGERY Right    •  SECTION     • HYSTERECTOMY     • JOINT REPLACEMENT Bilateral     knee          SWALLOW EVALUATION (last 72 hours)      Swallow Evaluation       18 0900                Rehab Evaluation    Document Type evaluation  -SA        Symptoms Noted During/After Treatment fatigue  -SA        General Information    Patient Profile Review yes  -SA        Pertinent History Of Current Problem --   admitted w/ FUO; new dx w/ CVA  -SA        Current Diet Limitations NPO  -SA        Prior Level of Function- Swallowing no diet consistency restrictions  -SA        Plans/Goals Discussed With patient and family  -        Barriers to Rehab medically complex  -SA        Clinical Impression    Patient's Goals For Discharge patient could not state  -SA        Family Goals For Discharge patient able to return to PO diet  -SA        SLP Swallowing Diagnosis oral dysfunction;pharyngeal dysfunction;other (see comments)   cognition/alertness  -SA        Rehab Potential/Prognosis, Swallowing good, to achieve stated therapy goals  -SA        Criteria for Skilled Therapeutic Interventions Met skilled criteria for dysphagia intervention met  -SA        Therapy Frequency PRN  -SA        Predicted Duration Therapy Interv (days) until discharge  -SA        SLP Diet Recommendation NPO: unsafe for food/liquid intake  -SA        Recommended Diagnostics reassess via clinical swallow (non-instrumental exam)  -SA        SLP Rec. for Method of Medication Administration meds via alternate route  -SA        Anticipated Discharge Disposition placement for care  -SA        Pain Assessment    Pain Assessment No/denies pain  -SA        Cognitive Assessment/Intervention    Current  Cognitive/Communication Assessment impaired  -          User Key  (r) = Recorded By, (t) = Taken By, (c) = Cosigned By    Initials Name Effective Dates     Nayana Stone, MS [facility]-SLP 04/13/15 -         EDUCATION  The patient has been educated in the following areas:   Dysphagia (Swallowing Impairment) Oral Care/Hydration NPO rationale.    SLP Recommendation and Plan  SLP Swallowing Diagnosis: oral dysfunction, pharyngeal dysfunction, other (see comments) (cognition/alertness)  SLP Diet Recommendation: NPO: unsafe for food/liquid intake     SLP Rec. for Method of Medication Administration: meds via alternate route     Recommended Diagnostics: reassess via clinical swallow (non-instrumental exam)  Criteria for Skilled Therapeutic Interventions Met: skilled criteria for dysphagia intervention met  Anticipated Discharge Disposition: placement for care  Rehab Potential/Prognosis, Swallowing: good, to achieve stated therapy goals  Therapy Frequency: PRN                        IP SLP Goals       01/23/18 0910          Safely Consume Diet    Safely Consume Diet- SLP, Date Established 01/23/18  -SA      Safely Consume Diet- SLP, Time to Achieve by discharge  -SA      Safely Consume Diet- SLP, Outcome goal ongoing  -        User Key  (r) = Recorded By, (t) = Taken By, (c) = Cosigned By    Initials Name Provider Type    SA Nayana Solano MS CCC-SLP Speech and Language Pathologist             SLP Outcome Measures (last 72 hours)      SLP Outcome Measures       01/23/18 0900          SLP Outcome Measures    Outcome Measure Used? Adult NOMS  -      FCM Scores    Swallowing FCM Score 1  -        User Key  (r) = Recorded By, (t) = Taken By, (c) = Cosigned By    Initials Name Effective Dates     Nayana Stone, MS DIAN-SLP 04/13/15 -            Time Calculation:         Time Calculation- SLP       01/23/18 0912          Time Calculation- SLP    SLP Start Time 0815  -      SLP Stop Time 0900  -      SLP Time  Calculation (min) 45 min  -      SLP Received On 01/23/18  -        User Key  (r) = Recorded By, (t) = Taken By, (c) = Cosigned By    Initials Name Provider Type    SA Nayana Solano MS CCC-SLP Speech and Language Pathologist          Therapy Charges for Today     Code Description Service Date Service Provider Modifiers Qty    [id number] HC ST EVAL ORAL PHARYNG SWALLOW 3 1/23/2018 Nayana Solano MS CCC-SLP GN 1               Nayana Solano MS CCC-FLETCHER  1/23/2018

## 2018-01-23 NOTE — SIGNIFICANT NOTE
01/23/18 1302   Rehab Treatment   Discipline physical therapist  (Simultaneous filing. User may be unaware of other data.)   Rehab Evaluation   Evaluation Not Performed patient unavailable for evaluation  (RN reports pt lethargic w/ HR in 130s at rest; also new bedrest orders in at 11 today.  Plan to attempt PT eval 1/24 - VIOLA Alvares agreeable.   Simultaneous filing. User may be unaware of other data.)   Recommendation   PT - Next Appointment 01/24/18

## 2018-01-23 NOTE — PROGRESS NOTES
LOS: 4 days     Chief Complaint:  Follow-up fever    History from pt and family.    Interval History:  Fever curve is improving. Tolerating antibiotics without rash or diarrhea. She is a bit more lethargic today. She has some cough. No exacerbating factors. Now being followed by neurology and cardiology. MRI brain did not show temporal enhancement. Crt stable.    ROS: no n/v/d    Vital Signs  Temp:  [98.1 °F (36.7 °C)-102.3 °F (39.1 °C)] 98.1 °F (36.7 °C)  Heart Rate:  [102-143] 116  Resp:  [16-20] 16  BP: (120-154)/(71-89) 123/78    Physical Exam:  General: lethargic, chronically ill appearing   Head: wearing cap, no trauma seen  Eyes:  EOMI, no scleral icterus, no conjunctival pallor, no conjunctival hemorrhages.   ENT: MM dry, cracked lips, OP clear, no thrush. Fair dentition.   Neck: Supple, no visible thyromegaly  Cardiovascular: tachycardic, RR, no murmurs, rubs, or gallops; 1+ pitting LE edema  Respiratory: Lungs are clear to ascultation bilaterally, no rales or wheezing; normal work of breathing on ambient air  GI: Abdomen is soft, non-tender, non-distended, normal bowel sounds in all four quadrants; no hepatosplenomegaly, no masses palpated  : no Pineda catheter present  Musculoskeletal: B ankle tenderness and swelling  Skin: thin skin on shins, 1 blister noted, no significant erythema  Neurological: Alert and oriented x 1, can't assess strength or sensation today  Psychiatric: mildly confused, calm, pleasant  Vasc: no cyanosis; PICC w/o erythema    Meds:    Current Facility-Administered Medications:   •  acetaminophen (TYLENOL) suppository 650 mg, 650 mg, Rectal, Q4H PRN, Raad Adams MD, 650 mg at 01/22/18 2801  •  acetaminophen (TYLENOL) tablet 650 mg, 650 mg, Oral, Q4H PRN **OR** acetaminophen (TYLENOL) suppository 650 mg, 650 mg, Rectal, Q4H PRN, RAMANDEEP Pedraza  •  acetaminophen (TYLENOL) tablet 650 mg, 650 mg, Oral, Q6H PRN, Hugo Elam MD, 650 mg at 01/22/18 2051  •  acyclovir  (ZOVIRAX) 620 mg in sodium chloride 0.9 % 100 mL IVPB, 10 mg/kg, Intravenous, Q12H, Dylon Andrews MD, 620 mg at 01/23/18 0152  •  amLODIPine (NORVASC) tablet 5 mg, 5 mg, Oral, Q24H, Elie Morales MD, 5 mg at 01/22/18 0832  •  ceFEPime (MAXIPIME) in SWFI 2g/10ml IV PUSH syringe, 2 g, Intravenous, Q12H, Dylon Andrews MD, 2 g at 01/23/18 0542  •  cycloSPORINE (RESTASIS) 0.05 % ophthalmic emulsion 1 drop, 1 drop, Both Eyes, BID, Hugo Elam MD, 1 drop at 01/23/18 0924  •  dextrose (D50W) solution 50 mL, 50 mL, Intravenous, Q1H PRN, Vinayak Feliciano MD, 50 mL at 01/20/18 0003  •  estradiol (ESTRACE) tablet 1 mg, 1 mg, Oral, Daily, Hugo Elam MD, 1 mg at 01/22/18 0832  •  Ferrex 150 forte plus capsule 1 capsule, 1 capsule, Oral, Daily With Breakfast, Hugo Elam MD, 1 capsule at 01/22/18 0832  •  ferrous sulfate tablet 325 mg, 325 mg, Oral, BID With Meals, Hugo Elam MD, 325 mg at 01/22/18 0832  •  gabapentin (NEURONTIN) capsule 200 mg, 200 mg, Oral, Q12H, Hugo Elam MD, 200 mg at 01/22/18 2051  •  Hold medication, 1 each, Does not apply, Continuous PRN, Dylon Andrews MD  •  hydrALAZINE (APRESOLINE) injection 10 mg, 10 mg, Intravenous, Q6H PRN, Elie Morales MD  •  HYDROcodone-acetaminophen (NORCO) 5-325 MG per tablet 1 tablet, 1 tablet, Oral, Q6H PRN, Hugo Elam MD, 1 tablet at 01/22/18 0625  •  metoprolol tartrate (LOPRESSOR) injection 5 mg, 5 mg, Intravenous, Q6H PRN, Elie Morales MD, 5 mg at 01/23/18 0913  •  ondansetron (ZOFRAN) injection 4 mg, 4 mg, Intravenous, Q6H PRN, RAMANDEEP Pedraza  •  pantoprazole (PROTONIX) injection 40 mg, 40 mg, Intravenous, BID AC, Elie Morales MD, 40 mg at 01/23/18 0908  •  Pharmacy to Dose acyclovir (ZOVIRAX), , Does not apply, Continuous PRN, Dylon Andrews MD  •  Pharmacy to dose vancomycin, , Does not apply, Continuous PRN, Dylon Andrews MD  •  sodium chloride  0.9 % flush 1-10 mL, 1-10 mL, Intravenous, PRN, RAMANDEEP Pedraza  •  sodium chloride 0.9 % flush 10 mL, 10 mL, Intracatheter, Q12H, Raad Adams MD, 10 mL at 01/22/18 2052  •  sodium chloride 0.9 % flush 10 mL, 10 mL, Intracatheter, PRN, Raad Adams MD  •  sodium chloride 0.9 % infusion, 100 mL/hr, Intravenous, Continuous, Elie Morales MD, Last Rate: 100 mL/hr at 01/23/18 0840, 100 mL/hr at 01/23/18 0840  •  temazepam (RESTORIL) capsule 15 mg, 15 mg, Oral, Nightly PRN, Hugo Elam MD, 15 mg at 01/22/18 2051  •  vancomycin (VANCOCIN) in iso-osmotic dextrose IVPB 1 g (premix) 200 mL, 1,000 mg, Intravenous, Q24H, Elie Morales MD, 1,000 mg at 01/22/18 1217    LABS:    Lab Results   Component Value Date    WBC 3.39 (L) 01/23/2018    HGB 7.4 (L) 01/23/2018    HCT 24.2 (L) 01/23/2018    MCV 71.4 (L) 01/23/2018    PLT 98 (L) 01/23/2018     Lab Results   Component Value Date    GLUCOSE 73 01/23/2018    BUN 26 (H) 01/23/2018    CREATININE 1.04 (H) 01/23/2018    EGFRIFAFRI 65 01/23/2018    BCR 25.0 01/23/2018    CO2 20.6 (L) 01/23/2018    CALCIUM 6.8 (L) 01/23/2018    ALBUMIN 1.40 (L) 01/23/2018    LABIL2 0.3 01/23/2018    AST 40 (H) 01/23/2018    ALT 14 01/23/2018    CRP 10.72 (H) 01/20/2018       Cortisol 7  Procal 1-->0.9  Urine Histo Ag pending  HIV negative  Hep C negative  RPR non-reactive  ABI pending  ANCA pending  RF 42  Ferritin 1812    LP Results:  11 WBCs (86% lymphs)  1640 RBCs  Pro 48  Glc 45  Gram stain and culture negative  HSV 1/2 PCR pending    Microbiology:  1/19 BCx: NGTD  1/20 RVP: negative  1/21 SpCx: NGTD  1/21 CSF Cx: NGTD    Radiology (personally reviewed):   MRI brain did NOT show temporal lobe enhancement    Assessment/Plan   1. Fever of unknown origin  2. Encephalopathy  3. B ankle pain and swelling  4. Rheumatoid arthritis  5. History of ischemic colitis    Difficult case. LP not suggestive of bacterial meningitis but does have 11 WBCs with mostly lymphocytes. Given  confusion, it gives me some suspicion for HSV infection and therefore started empiric acyclovir. HSV 1/2 PCR is pending. I appreciate neurology's evaluation and plans for repeat LP with which I wholeheartedly agree.    She remains on empiric vancomycin and cefepime given CT chest findings. Of note, her confusion predated the cefepime.    LDH was slightly high and a few enlarged LNs seen on scan. Flow cytometry is pending.    I have placed rheumatology consult.     I do not think she has cellulitis.    Thank you for this consult. ID will follow. I have discussed this case with Dr Welch and the patient's sister and son.

## 2018-01-23 NOTE — SIGNIFICANT NOTE
01/23/18 1302   Rehab Treatment   Discipline physical therapist   Rehab Evaluation   Evaluation Not Performed patient unavailable for evaluation  (RN reports pt lethargic w/ HR in 130s at rest; also new bedrest orders in at 11 today.  Plan to attempt PT eval 1/24 - VIOLA blackwellable.  )   Recommendation   PT - Next Appointment 01/24/18

## 2018-01-24 ENCOUNTER — APPOINTMENT (OUTPATIENT)
Dept: GENERAL RADIOLOGY | Facility: HOSPITAL | Age: 63
End: 2018-01-24
Attending: PSYCHIATRY & NEUROLOGY

## 2018-01-24 ENCOUNTER — APPOINTMENT (OUTPATIENT)
Dept: MRI IMAGING | Facility: HOSPITAL | Age: 63
End: 2018-01-24
Attending: PSYCHIATRY & NEUROLOGY

## 2018-01-24 ENCOUNTER — APPOINTMENT (OUTPATIENT)
Dept: CT IMAGING | Facility: HOSPITAL | Age: 63
End: 2018-01-24

## 2018-01-24 PROBLEM — D69.6 THROMBOCYTOPENIA (HCC): Status: ACTIVE | Noted: 2018-01-24

## 2018-01-24 LAB
ANION GAP SERPL CALCULATED.3IONS-SCNC: 11.7 MMOL/L
BACTERIA SPEC AEROBE CULT: NORMAL
BACTERIA SPEC RESP CULT: NORMAL
BACTERIA SPEC RESP CULT: NORMAL
BUN BLD-MCNC: 28 MG/DL (ref 8–23)
BUN/CREAT SERPL: 26.9 (ref 7–25)
CALCIUM SPEC-SCNC: 6.6 MG/DL (ref 8.6–10.5)
CHLORIDE SERPL-SCNC: 113 MMOL/L (ref 98–107)
CHOLEST SERPL-MCNC: 102 MG/DL (ref 0–200)
CO2 SERPL-SCNC: 18.3 MMOL/L (ref 22–29)
CREAT BLD-MCNC: 1.04 MG/DL (ref 0.57–1)
DEPRECATED RDW RBC AUTO: 57.8 FL (ref 37–54)
ERYTHROCYTE [DISTWIDTH] IN BLOOD BY AUTOMATED COUNT: 22.2 % (ref 11.7–13)
GFR SERPL CREATININE-BSD FRML MDRD: 65 ML/MIN/1.73
GLUCOSE BLD-MCNC: 80 MG/DL (ref 65–99)
GRAM STN SPEC: NORMAL
HBA1C MFR BLD: 5.6 % (ref 4.8–5.6)
HCT VFR BLD AUTO: 24.3 % (ref 35.6–45.5)
HDLC SERPL-MCNC: 9 MG/DL (ref 40–60)
HGB BLD-MCNC: 7.3 G/DL (ref 11.9–15.5)
LDLC SERPL CALC-MCNC: 24 MG/DL (ref 0–100)
LDLC/HDLC SERPL: 2.62 {RATIO}
MCH RBC QN AUTO: 21.8 PG (ref 26.9–32)
MCHC RBC AUTO-ENTMCNC: 30 G/DL (ref 32.4–36.3)
MCV RBC AUTO: 72.5 FL (ref 80.5–98.2)
PLATELET # BLD AUTO: 69 10*3/MM3 (ref 140–500)
PMV BLD AUTO: ABNORMAL FL (ref 6–12)
POTASSIUM BLD-SCNC: 3.2 MMOL/L (ref 3.5–5.2)
RBC # BLD AUTO: 3.35 10*6/MM3 (ref 3.9–5.2)
REF LAB TEST METHOD: NORMAL
SODIUM BLD-SCNC: 143 MMOL/L (ref 136–145)
TRIGL SERPL-MCNC: 347 MG/DL (ref 0–150)
TSH SERPL DL<=0.05 MIU/L-ACNC: 0.29 MIU/ML (ref 0.27–4.2)
VLDLC SERPL-MCNC: 69.4 MG/DL (ref 5–40)
WBC NRBC COR # BLD: 2.75 10*3/MM3 (ref 4.5–10.7)

## 2018-01-24 PROCEDURE — 25010000003 POTASSIUM CHLORIDE 10 MEQ/100ML SOLUTION: Performed by: HOSPITALIST

## 2018-01-24 PROCEDURE — 70496 CT ANGIOGRAPHY HEAD: CPT

## 2018-01-24 PROCEDURE — 0 GADOBENATE DIMEGLUMINE 529 MG/ML SOLUTION: Performed by: HOSPITALIST

## 2018-01-24 PROCEDURE — 0 IOPAMIDOL PER 1 ML: Performed by: HOSPITALIST

## 2018-01-24 PROCEDURE — 80048 BASIC METABOLIC PNL TOTAL CA: CPT | Performed by: HOSPITALIST

## 2018-01-24 PROCEDURE — 25010000002 CEFEPIME PER 500 MG: Performed by: INTERNAL MEDICINE

## 2018-01-24 PROCEDURE — 70498 CT ANGIOGRAPHY NECK: CPT

## 2018-01-24 PROCEDURE — 36430 TRANSFUSION BLD/BLD COMPNT: CPT

## 2018-01-24 PROCEDURE — 25010000002 DIPHENHYDRAMINE PER 50 MG: Performed by: HOSPITALIST

## 2018-01-24 PROCEDURE — A9577 INJ MULTIHANCE: HCPCS | Performed by: HOSPITALIST

## 2018-01-24 PROCEDURE — 99233 SBSQ HOSP IP/OBS HIGH 50: CPT | Performed by: INTERNAL MEDICINE

## 2018-01-24 PROCEDURE — 70553 MRI BRAIN STEM W/O & W/DYE: CPT

## 2018-01-24 PROCEDURE — 86900 BLOOD TYPING SEROLOGIC ABO: CPT

## 2018-01-24 PROCEDURE — 80061 LIPID PANEL: CPT | Performed by: NURSE PRACTITIONER

## 2018-01-24 PROCEDURE — 85027 COMPLETE CBC AUTOMATED: CPT | Performed by: HOSPITALIST

## 2018-01-24 PROCEDURE — 25010000002 ACYCLOVIR PER 5 MG: Performed by: INTERNAL MEDICINE

## 2018-01-24 PROCEDURE — 83036 HEMOGLOBIN GLYCOSYLATED A1C: CPT | Performed by: NURSE PRACTITIONER

## 2018-01-24 PROCEDURE — 84443 ASSAY THYROID STIM HORMONE: CPT | Performed by: NURSE PRACTITIONER

## 2018-01-24 PROCEDURE — 99231 SBSQ HOSP IP/OBS SF/LOW 25: CPT | Performed by: INTERNAL MEDICINE

## 2018-01-24 PROCEDURE — P9035 PLATELET PHERES LEUKOREDUCED: HCPCS

## 2018-01-24 PROCEDURE — 99232 SBSQ HOSP IP/OBS MODERATE 35: CPT | Performed by: PSYCHIATRY & NEUROLOGY

## 2018-01-24 PROCEDURE — P9016 RBC LEUKOCYTES REDUCED: HCPCS

## 2018-01-24 RX ORDER — DIPHENHYDRAMINE HYDROCHLORIDE 50 MG/ML
25 INJECTION INTRAMUSCULAR; INTRAVENOUS ONCE
Status: COMPLETED | OUTPATIENT
Start: 2018-01-24 | End: 2018-01-24

## 2018-01-24 RX ORDER — POTASSIUM CHLORIDE 1.5 G/1.77G
40 POWDER, FOR SOLUTION ORAL AS NEEDED
Status: DISCONTINUED | OUTPATIENT
Start: 2018-01-24 | End: 2018-01-27 | Stop reason: HOSPADM

## 2018-01-24 RX ORDER — ACETAMINOPHEN 650 MG/1
650 SUPPOSITORY RECTAL ONCE
Status: COMPLETED | OUTPATIENT
Start: 2018-01-24 | End: 2018-01-24

## 2018-01-24 RX ORDER — POTASSIUM CHLORIDE 750 MG/1
40 CAPSULE, EXTENDED RELEASE ORAL AS NEEDED
Status: DISCONTINUED | OUTPATIENT
Start: 2018-01-24 | End: 2018-01-27 | Stop reason: HOSPADM

## 2018-01-24 RX ORDER — POTASSIUM CHLORIDE 7.45 MG/ML
10 INJECTION INTRAVENOUS
Status: DISCONTINUED | OUTPATIENT
Start: 2018-01-24 | End: 2018-01-27 | Stop reason: HOSPADM

## 2018-01-24 RX ADMIN — SODIUM CHLORIDE 75 ML/HR: 9 INJECTION, SOLUTION INTRAVENOUS at 22:30

## 2018-01-24 RX ADMIN — ACETAMINOPHEN 650 MG: 650 SUPPOSITORY RECTAL at 18:57

## 2018-01-24 RX ADMIN — WATER 2 G: 1 INJECTION INTRAMUSCULAR; INTRAVENOUS; SUBCUTANEOUS at 06:02

## 2018-01-24 RX ADMIN — WATER 2 G: 1 INJECTION INTRAMUSCULAR; INTRAVENOUS; SUBCUTANEOUS at 22:31

## 2018-01-24 RX ADMIN — Medication 10 ML: at 22:25

## 2018-01-24 RX ADMIN — POTASSIUM CHLORIDE 10 MEQ: 7.46 INJECTION, SOLUTION INTRAVENOUS at 13:23

## 2018-01-24 RX ADMIN — POTASSIUM CHLORIDE 10 MEQ: 7.46 INJECTION, SOLUTION INTRAVENOUS at 11:56

## 2018-01-24 RX ADMIN — ACETAMINOPHEN 650 MG: 650 SUPPOSITORY RECTAL at 10:57

## 2018-01-24 RX ADMIN — ACYCLOVIR SODIUM 620 MG: 1000 INJECTION, SOLUTION INTRAVENOUS at 22:30

## 2018-01-24 RX ADMIN — GADOBENATE DIMEGLUMINE 13 ML: 529 INJECTION, SOLUTION INTRAVENOUS at 09:15

## 2018-01-24 RX ADMIN — DIPHENHYDRAMINE HYDROCHLORIDE 25 MG: 50 INJECTION, SOLUTION INTRAMUSCULAR; INTRAVENOUS at 10:57

## 2018-01-24 RX ADMIN — IOPAMIDOL 95 ML: 755 INJECTION, SOLUTION INTRAVENOUS at 03:51

## 2018-01-24 RX ADMIN — CYCLOSPORINE 1 DROP: 0.5 EMULSION OPHTHALMIC at 22:49

## 2018-01-24 RX ADMIN — ACYCLOVIR SODIUM 620 MG: 1000 INJECTION, SOLUTION INTRAVENOUS at 02:44

## 2018-01-24 RX ADMIN — CYCLOSPORINE 1 DROP: 0.5 EMULSION OPHTHALMIC at 09:29

## 2018-01-24 NOTE — NURSING NOTE
"Received consult from staff RN r/t \"weeping legs\".  Pt lethargic, cried out when legs moved but no other verbalization.  Has pressure reduction boots on but right foot anatomy is such that boot doesn't fit very well so removed and floated heel off mattress with pillow under calf.  No weeping of legs noted; has small silicone border drsg over an intact blister on left calf.  Family says that edema in legs is much improved.  On accumax mattress with pump.   Family states concern that diagnosis hasn't been established and pt normally not confused.    "

## 2018-01-24 NOTE — PROGRESS NOTES
LOS: 5 days     Chief Complaint:  Follow-up fever    History from pt and family.    Interval History:  Fever curve much improved. Mental status is worse. She is minimally responsive but protecting her airway. Cannot obtain further history due to mental status.    ROS: Cannot obtain due to mental status.    Vital Signs  Temp:  [98.4 °F (36.9 °C)-100.3 °F (37.9 °C)] 98.4 °F (36.9 °C)  Heart Rate:  [109-119] 109  Resp:  [16] 16  BP: (129-143)/(89-97) 129/89    Physical Exam:  General: lethargic, chronically ill appearing   Head: wearing cap, no trauma seen  Eyes:  EOMI, no scleral icterus  ENT: MM dry, cracked lips, OP clear, no thrush. Fair dentition.   Neck: Supple, no visible thyromegaly  Cardiovascular: tachycardic, RR, no murmurs, rubs, or gallops; 1+ pitting LE edema  Respiratory: Lungs are clear to ascultation bilaterally, no rales or wheezing; normal work of breathing on ambient air  GI: Abdomen is soft, non-tender, non-distended  : no Pineda catheter present  Musculoskeletal: B ankle tenderness and swelling  Skin: thin skin on shins, 1 blister noted, no significant erythema  Neurological: Alert and oriented x01, can't assess strength or sensation today  Psychiatric: mildly confused, calm, pleasant  Vasc: no cyanosis; PICC w/o erythema    Meds:    Current Facility-Administered Medications:   •  acetaminophen (TYLENOL) suppository 650 mg, 650 mg, Rectal, Q4H PRN, Raad Adams MD, 650 mg at 01/23/18 2123  •  acetaminophen (TYLENOL) tablet 650 mg, 650 mg, Oral, Q4H PRN **OR** acetaminophen (TYLENOL) suppository 650 mg, 650 mg, Rectal, Q4H PRN, RAMANDEEP Pedraza  •  acetaminophen (TYLENOL) tablet 650 mg, 650 mg, Oral, Q6H PRN, Hugo Elam MD, 650 mg at 01/22/18 2051  •  acyclovir (ZOVIRAX) 620 mg in sodium chloride 0.9 % 100 mL IVPB, 10 mg/kg, Intravenous, Q12H, Dylon Andrews MD, 620 mg at 01/24/18 9221  •  amLODIPine (NORVASC) tablet 5 mg, 5 mg, Oral, Q24H, Elie Morales MD, Stopped at  01/24/18 1030  •  ceFEPime (MAXIPIME) in SWFI 2g/10ml IV PUSH syringe, 2 g, Intravenous, Q12H, Dylon Andrews MD, 2 g at 01/24/18 0602  •  cycloSPORINE (RESTASIS) 0.05 % ophthalmic emulsion 1 drop, 1 drop, Both Eyes, BID, Hugo Elam MD, 1 drop at 01/23/18 2122  •  dextrose (D50W) solution 50 mL, 50 mL, Intravenous, Q1H PRN, Vinayak Feliciano MD, 50 mL at 01/20/18 0003  •  estradiol (ESTRACE) tablet 1 mg, 1 mg, Oral, Daily, Hugo Elam MD, 1 mg at 01/22/18 0832  •  Ferrex 150 forte plus capsule 1 capsule, 1 capsule, Oral, Daily With Breakfast, Hugo Elam MD, 1 capsule at 01/22/18 0832  •  ferrous sulfate tablet 325 mg, 325 mg, Oral, BID With Meals, Hugo Elam MD, Stopped at 01/23/18 1800  •  gabapentin (NEURONTIN) capsule 200 mg, 200 mg, Oral, Q12H, Hugo Elam MD, Stopped at 01/23/18 1801  •  Hold medication, 1 each, Does not apply, Continuous PRN, Dylon Adnrews MD  •  Hold medication, 1 each, Does not apply, Continuous PRN, Enamnuel Welch MD  •  Hold medication, 1 each, Does not apply, Continuous PRN, Enmanuel Welch MD  •  Hold medication, 1 each, Does not apply, Continuous PRN, Enmanuel Welch MD  •  Hold medication, 1 each, Does not apply, Continuous PRN, Enmanuel Welch MD  •  hydrALAZINE (APRESOLINE) injection 10 mg, 10 mg, Intravenous, Q6H PRN, Elie Morales MD  •  HYDROcodone-acetaminophen (NORCO) 5-325 MG per tablet 1 tablet, 1 tablet, Oral, Q6H PRN, Hugo Elam MD, 1 tablet at 01/22/18 0625  •  metoprolol tartrate (LOPRESSOR) injection 5 mg, 5 mg, Intravenous, Q6H PRN, Elie Morales MD, 5 mg at 01/23/18 1607  •  ondansetron (ZOFRAN) injection 4 mg, 4 mg, Intravenous, Q6H PRN, RAMANDEEP Pedraza  •  pantoprazole (PROTONIX) injection 40 mg, 40 mg, Intravenous, BID AC, Elie Morales MD, 40 mg at 01/23/18 0908  •  Pharmacy to Dose acyclovir (ZOVIRAX), , Does not apply, Continuous PRN, Dylon Lomax  MD Juliana  •  Pharmacy to dose vancomycin, , Does not apply, Continuous PRN, Dylon Andrews MD  •  potassium chloride (MICRO-K) CR capsule 40 mEq, 40 mEq, Oral, PRN **OR** potassium chloride (KLOR-CON) packet 40 mEq, 40 mEq, Oral, PRN **OR** potassium chloride 10 mEq in 100 mL IVPB, 10 mEq, Intravenous, Q1H PRN, Elie Morales MD, Last Rate: 100 mL/hr at 01/24/18 1323, 10 mEq at 01/24/18 1323  •  potassium chloride (MICRO-K) CR capsule 40 mEq, 40 mEq, Oral, Once, Kip Roberson MD  •  sodium chloride 0.9 % flush 1-10 mL, 1-10 mL, Intravenous, PRN, Tricia Albarran, APRN  •  sodium chloride 0.9 % flush 10 mL, 10 mL, Intracatheter, Q12H, Raad Adams MD, 10 mL at 01/23/18 2124  •  sodium chloride 0.9 % flush 10 mL, 10 mL, Intracatheter, PRN, Raad Adams MD  •  sodium chloride 0.9 % infusion, 75 mL/hr, Intravenous, Continuous, Kip Roberson MD, Last Rate: 75 mL/hr at 01/23/18 2122, 75 mL/hr at 01/23/18 2122  •  temazepam (RESTORIL) capsule 15 mg, 15 mg, Oral, Nightly PRN, Hugo Elam MD, 15 mg at 01/22/18 2051  •  vancomycin (VANCOCIN) in iso-osmotic dextrose IVPB 1 g (premix) 200 mL, 1,000 mg, Intravenous, Q24H, Elie Morales MD, 1,000 mg at 01/23/18 1607    LABS:    Lab Results   Component Value Date    WBC 2.75 (L) 01/24/2018    HGB 7.3 (L) 01/24/2018    HCT 24.3 (L) 01/24/2018    MCV 72.5 (L) 01/24/2018    PLT 69 (L) 01/24/2018     Lab Results   Component Value Date    GLUCOSE 80 01/24/2018    BUN 28 (H) 01/24/2018    CREATININE 1.04 (H) 01/24/2018    EGFRIFAFRI 65 01/24/2018    BCR 26.9 (H) 01/24/2018    CO2 18.3 (L) 01/24/2018    CALCIUM 6.6 (L) 01/24/2018    ALBUMIN 1.40 (L) 01/23/2018    LABIL2 0.3 01/23/2018    AST 40 (H) 01/23/2018    ALT 14 01/23/2018    CRP 10.72 (H) 01/20/2018       Cortisol 7  Procal 1-->0.9  Urine Histo Ag negative  HIV negative  Hep C negative  RPR non-reactive  ABI pending  ANCA pending  RF 42  Ferritin 1812    LP Results:  11 WBCs (86%  lymphs)  1640 RBCs  Pro 48  Glc 45  Gram stain and culture negative  HSV 1/2 PCR pending    Microbiology:  1/19 BCx: negative  1/20 RVP: negative  1/21 SpCx: negative  1/21 CSF Cx: negative    Radiology (personally reviewed):   MRA with ? SAH    Assessment/Plan   1. Fever of unknown origin  2. Encephalitis  3. B ankle pain and swelling  4. Rheumatoid arthritis  5. History of ischemic colitis    Continue empiric acyclovir. Awaiting HSV PCR result. Will call reference lab for an update. Fevers are improved. Continue empiric cefepime. Will repeat a procal tomorrow but I have a much lower suspicion for bacterial infection at this point. I am going to stop the vancomycin. There is no evidence of MRSA and it may be contributing to her worsening leukopenia.    Noted neurology's extensive evaluation and imaging. I would not be opposed to a trial of steroids if HSV PCR returns negative. Awaiting rheum eval. Consulted yesterday. ABI and ANCA are pending.    There was mild lymphadenopathy on her scan. LDH slightly elevated. Flow cytometry ordered.    I do not think she has cellulitis.    Thank you for this consult. ID will follow. I have discussed this case with Dr Welch and her family.

## 2018-01-24 NOTE — PROGRESS NOTES
LOS: 5 days   Patient Care Team:  Javier Soriano III, MD as PCP - General (Internal Medicine)    Chief Complaint: Follow-up sinus tachycardia    Interval History: Possible SAH on MRA of brain.  Still altered mental status.  Sinus tachycardia with rate of about 110.    Vital Signs:  Temp:  [98.5 °F (36.9 °C)-100.3 °F (37.9 °C)] 98.5 °F (36.9 °C)  Heart Rate:  [110-124] 110  Resp:  [16] 16  BP: (132-167)/(90-97) 143/90  No intake or output data in the 24 hours ending 01/24/18 0858    Physical Exam:   General Appearance:    Somnolent, arouses to tactile response.  Does not follow commands.    Lungs:     Clear to auscultation bilaterally     Heart:    Regular rhythm and tachy rate.  No murmurs, gallops, or       rubs.   Abdomen:     Soft, non-tender, non-distended.    Extremities:   Trace to 1+ edema.     Results Review:      Results from last 7 days  Lab Units 01/24/18  0249   SODIUM mmol/L 143   POTASSIUM mmol/L 3.2*   CHLORIDE mmol/L 113*   CO2 mmol/L 18.3*   BUN mg/dL 28*   CREATININE mg/dL 1.04*   GLUCOSE mg/dL 80   CALCIUM mg/dL 6.6*           Results from last 7 days  Lab Units 01/24/18  0249   WBC 10*3/mm3 2.75*   HEMOGLOBIN g/dL 7.3*   HEMATOCRIT % 24.3*   PLATELETS 10*3/mm3 69*           Results from last 7 days  Lab Units 01/24/18  0249   CHOLESTEROL mg/dL 102       Results from last 7 days  Lab Units 01/21/18  1937   MAGNESIUM mg/dL 1.9       Results from last 7 days  Lab Units 01/24/18  0249   CHOLESTEROL mg/dL 102   TRIGLYCERIDES mg/dL 347*   HDL CHOL mg/dL 9*       I reviewed the patient's new clinical results.        Assessment:  1. Fever of unknown origin  2. Possible lingular and ADDISON pneumonia  3. Possible SAH hemorrhage  4. Altered mental status (TME)  5. Melena (GI bleed)  6. Acute kidney injury  7. Pancytopenia   8. Rheumatoid arthritis   9. Recent ischemic colitis   10. Sinus tachycardia     Plan:  -Neuro work-up in progress.  Possible SAH on MRI, although CTA does not sound as clear-cut.  Per  Neurology.  -Echocardiogram with normal EF and no embolic source noted.  -Sinus tachycardia is physiological and expected in this clinical scenario.  Would not treat.  TSH normal.      Sarbjit Manzanares MD  01/24/18  8:58 AM

## 2018-01-24 NOTE — PROGRESS NOTES
"   LOS: 5 days   Patient Care Team:  Javier Soriano III, MD as PCP - General (Internal Medicine)    Chief Complaint/ Reason for encounter: Acute renal failure, hypokalemia        Subjective     Fever    Pertinent negatives include no chest pain or nausea.   Foot Pain   Associated symptoms include weakness. Pertinent negatives include no chest pain, fever or nausea.       Subjective:  Symptoms:  Stable.  She reports weakness.  No shortness of breath or chest pain.  (Remains confused, low-grade fevers occasionally  Noted plans for repeat lumbar puncture).    Diet:  Poor intake.  No nausea.    Activity level: Impaired due to weakness.    Pain:  She reports no pain.          History taken from: Patient and chart    Objective     Vital Signs  Temp:  [98.4 °F (36.9 °C)-100.3 °F (37.9 °C)] 98.4 °F (36.9 °C)  Heart Rate:  [109-119] 109  Resp:  [16] 16  BP: (129-143)/(89-97) 129/89       Wt Readings from Last 1 Encounters:   01/24/18 0500 67.6 kg (149 lb)   01/23/18 0537 65 kg (143 lb 4.8 oz)   01/22/18 0500 61.7 kg (136 lb)   01/21/18 0737 63.3 kg (139 lb 8 oz)   01/20/18 0546 63.1 kg (139 lb 1.6 oz)   01/19/18 2243 61.6 kg (135 lb 14.4 oz)   01/19/18 1351 57.6 kg (127 lb)       Objective:  General Appearance:  Comfortable, ill-appearing, in no acute distress and not in pain (Thin, mildly cachectic, confused).    Vital signs: (most recent): Blood pressure 129/89, pulse 109, temperature 98.4 °F (36.9 °C), temperature source Oral, resp. rate 16, height 149.9 cm (59\"), weight 67.6 kg (149 lb), SpO2 98 %.  Vital signs are normal.  No fever.    Output: Producing urine.    HEENT: Normal HEENT exam.    Lungs:  Normal respiratory rate and normal effort.  She is not in respiratory distress.  Breath sounds clear to auscultation.  There are decreased breath sounds.  No wheezes or rales.    Heart: Normal rate.  Regular rhythm.    Abdomen: Abdomen is soft and non-distended.  Bowel sounds are normal.   There is no abdominal tenderness.  " There is no epigastric area or no suprapubic area tenderness.  There is no rebound tenderness.   There is no mass.   Extremities: Normal range of motion.  There is no deformity or dependent edema.    Pulses: Distal pulses are intact.    Skin:  Warm and dry.  There is a rash.  No ecchymosis or ulceration.             Results Review:    Past Medical History: reviewed and updated  Past Medical History:   Diagnosis Date   • Hypertension    • Ischemic colitis    • Rheumatoid arthritis    • Thyroid nodule          Allergies:  Allergies   Allergen Reactions   • Buffered Aspirin Rash and Shortness Of Breath   • Ace Inhibitors      cough   • Aurothioglucose Diarrhea     Increased AST & ALT   • Latex Itching   • Keflex [Cephalexin] Hives and Rash       Intake/Output:   No intake or output data in the 24 hours ending 01/24/18 1335      DATA:  Interval chart, labs and notes reviewed.  The following labs personally reviewed by me.  Renal ultrasound personally reviewed, no obstruction  I personally reviewed her old records from [facility], baseline creatinine 1.4  Discussed with her son at bedside, interval history obtained from him as well regarding recent health and complaints    Labs:   Recent Results (from the past 24 hour(s))   Adult Transthoracic Echo Complete W/ Cont if Necessary Per Protocol    Collection Time: 01/23/18  4:58 PM   Result Value Ref Range    BSA 1.6 m^2    IVSd 1.1 cm    LVIDd 3.9 cm    LVIDs 2.5 cm    LVPWd 1.0 cm    IVS/LVPW 1.1     FS 35.9 %    EDV(Teich) 65.9 ml    ESV(Teich) 22.3 ml    EF(Teich) 66.1 %    EDV(cubed) 59.3 ml    ESV(cubed) 15.6 ml    EF(cubed) 73.7 %    LV mass(C)d 131.0 grams    LV mass(C)dI 81.9 grams/m^2    SV(Teich) 43.6 ml    SI(Teich) 27.3 ml/m^2    SV(cubed) 43.7 ml    SI(cubed) 27.3 ml/m^2    Ao root diam 2.9 cm    Ao root area 6.6 cm^2    ACS 1.8 cm    LA dimension 2.4 cm    LA/Ao 0.83     LVOT diam 1.8 cm    LVOT area 2.5 cm^2    LVOT area(traced) 2.5 cm^2    RVOT diam 1.9 cm     RVOT area 2.8 cm^2    LVLd ap4 7.4 cm    EDV(MOD-sp4) 55.0 ml    LVLs ap4 6.6 cm    ESV(MOD-sp4) 18.0 ml    EF(MOD-sp4) 67.3 %    LVLd ap2 7.7 cm    EDV(MOD-sp2) 61.0 ml    LVLs ap2 6.9 cm    ESV(MOD-sp2) 24.0 ml    EF(MOD-sp2) 60.7 %    SV(MOD-sp4) 37.0 ml    SI(MOD-sp4) 23.1 ml/m^2    SV(MOD-sp2) 37.0 ml    SI(MOD-sp2) 23.1 ml/m^2    Ao root area (BSA corrected) 1.8     Ao root area (BSA corrected) 60.7 ml/m^2    CONTRAST EF 4CH 67.3 ml/m^2    LV Diastolic corrected for BSA 34.4 ml/m^2    LV Systolic corrected for BSA 11.3 ml/m^2    MV A dur 0.1 sec    MV E max larry 68.1 cm/sec    MV A max larry 107.0 cm/sec    MV E/A 0.64     MV V2 mean 77.5 cm/sec    MV mean PG 3.0 mmHg    MV V2 VTI 15.4 cm    MVA(VTI) 3.3 cm^2    MV P1/2t max larry 65.2 cm/sec    MV P1/2t 28.8 msec    MVA(P1/2t) 7.6 cm^2    MV dec slope 662.0 cm/sec^2    MV dec time 0.09 sec    Ao V2 mean 111.0 cm/sec    Ao mean PG 6.0 mmHg    Ao mean PG (full) 2.0 mmHg    Ao V2 VTI 22.2 cm    BRENT(I,A) 2.3 cm^2    BRENT(I,D) 2.3 cm^2    LV V1 mean PG 4.0 mmHg    LV V1 mean 91.5 cm/sec    LV V1 VTI 19.9 cm    SV(Ao) 146.6 ml    SI(Ao) 91.7 ml/m^2    SV(LVOT) 50.6 ml    SV(RVOT) 43.7 ml    SI(LVOT) 31.7 ml/m^2    PA V2 max 149.0 cm/sec    PA max PG 8.9 mmHg    PA max PG (full) 3.1 mmHg    BH CV ECHO LEODAN - PVA(V,A) 2.3 cm^2     CV ECHO LEODAN - PVA(V,D) 2.3 cm^2    PA acc slope 1663 cm/sec^2    PA acc time 0.09 sec    RV V1 max PG 5.8 mmHg    RV V1 mean PG 3.0 mmHg    RV V1 max 120.0 cm/sec    RV V1 mean 77.9 cm/sec    RV V1 VTI 15.4 cm    TR max larry 295.0 cm/sec    PA pr(Accel) 38.5 mmHg    Pulm Sys Larry 75.8 cm/sec    Pulm Moses Larry 67.4 cm/sec    Pulm S/D 1.1     Qp/Qs 0.86     Pulm A Revs Dur 0.12 sec    Pulm A Revs Larry 49.4 cm/sec    MVA P1/2T LCG 3.4 cm^2     CV ECHO LEODAN - BZI_BMI 28.9 kilograms/m^2     CV ECHO LEODAN - BSA([facility]) 1.7 m^2     CV ECHO LEODAN - BZI_METRIC_WEIGHT 64.9 kg     CV ECHO LEODAN - BZI_METRIC_HEIGHT 149.9 cm    Target HR (85%) 133 bpm     Max. Pred. HR (100%) 157 bpm    BH CV VAS BP RIGHT /97 mmHg    TDI S' 23.00 cm/sec    RV Base 2.90 cm    LA volume 35.0 cm3    E/E' ratio 9.0     LA Volume Index 23.0 mL/m2    Ao pk larry 164.0 cm/sec    Lat Peak E' Larry 9.0 cm/sec    LV V1 max 136.0 cm/sec    Med Peak E' Larry 7.00 cm/sec    RAP systole 3 mmHg    RVSP(TR) 37 mmHg    Ao max PG 11.0 mmHg    TAPSE (>1.6) 2.00 cm2    Echo EF Estimated 67 %   Hemoglobin A1c    Collection Time: 01/24/18  2:49 AM   Result Value Ref Range    Hemoglobin A1C 5.60 4.80 - 5.60 %   Lipid Panel    Collection Time: 01/24/18  2:49 AM   Result Value Ref Range    Total Cholesterol 102 0 - 200 mg/dL    Triglycerides 347 (H) 0 - 150 mg/dL    HDL Cholesterol 9 (L) 40 - 60 mg/dL    LDL Cholesterol  24 0 - 100 mg/dL    VLDL Cholesterol 69.4 (H) 5 - 40 mg/dL    LDL/HDL Ratio 2.62    TSH    Collection Time: 01/24/18  2:49 AM   Result Value Ref Range    TSH 0.295 0.270 - 4.200 mIU/mL   CBC (No Diff)    Collection Time: 01/24/18  2:49 AM   Result Value Ref Range    WBC 2.75 (L) 4.50 - 10.70 10*3/mm3    RBC 3.35 (L) 3.90 - 5.20 10*6/mm3    Hemoglobin 7.3 (L) 11.9 - 15.5 g/dL    Hematocrit 24.3 (L) 35.6 - 45.5 %    MCV 72.5 (L) 80.5 - 98.2 fL    MCH 21.8 (L) 26.9 - 32.0 pg    MCHC 30.0 (L) 32.4 - 36.3 g/dL    RDW 22.2 (H) 11.7 - 13.0 %    RDW-SD 57.8 (H) 37.0 - 54.0 fl    MPV  6.0 - 12.0 fL    Platelets 69 (L) 140 - 500 10*3/mm3   Basic Metabolic Panel    Collection Time: 01/24/18  2:49 AM   Result Value Ref Range    Glucose 80 65 - 99 mg/dL    BUN 28 (H) 8 - 23 mg/dL    Creatinine 1.04 (H) 0.57 - 1.00 mg/dL    Sodium 143 136 - 145 mmol/L    Potassium 3.2 (L) 3.5 - 5.2 mmol/L    Chloride 113 (H) 98 - 107 mmol/L    CO2 18.3 (L) 22.0 - 29.0 mmol/L    Calcium 6.6 (L) 8.6 - 10.5 mg/dL    eGFR  African Amer 65 >60 mL/min/1.73    BUN/Creatinine Ratio 26.9 (H) 7.0 - 25.0    Anion Gap 11.7 mmol/L   Prepare RBC, 1 Units    Collection Time: 01/24/18 10:38 AM   Result Value Ref Range    Product Code  Z1713H07     Unit Number [id number]     UNIT  ABO O     UNIT  RH POS     Dispense Status XM     Blood Type OPOS     Blood Expiration Date [id number]     Blood Type Barcode 5100        Radiology:  Imaging Results (last 24 hours)     Procedure Component Value Units Date/Time    MRI Angiogram Head Without Contrast [[id number]] Collected:  01/24/18 0013     Updated:  01/24/18 0013    Narrative:       MRI ANGIOGRAM HEAD - WITHOUT GADOLINIUM INTRAVENOUS CONTRAST.     TECHNIQUE: Routine brain MRA was performed. 3-D time-of-flight technique  was used without intravenous contrast. Source and MIP images were  reviewed.      HISTORY: Neurological change, weakness.     COMPARISON: 01/22/2018.     FINDINGS:  Examination is limited, source time-of-flight images are not available.  An aneurysm cannot be definitely excluded.     Abnormal gyral high signal is seen on the diffusion sequence is in the  right frontal lobe, anterior right temporal lobe and left temporal lobe.  Findings of small vessel ischemic disease, a few old lacunar infarcts  and cortical atrophy.     Bilateral internal carotid arteries are symmetric and patent. Their  bifurcations are symmetric and patent.      Anterior cerebral arteries are patent. The middle cerebral arteries are  patent.      Distal vertebral arteries are patent. The posterior inferior cerebellar  arteries (PICAs) are symmetric and patent.      The basilar artery is continuously patent. The superior cerebellar  arteries are symmetric and patent.      The P1 segments are patent. The posterior cerebral arteries (PCAs) are  patent. The posterior communicating arteries are symmetric and patent.        Impression:       Moderate amount of subarachnoid hemorrhage layering the right frontal  lobe and both temporal lobes.     Incomplete examination, source time-of-flight images are not available  due to technical reasons. Complete report to follow. Aneurysm cannot be  excluded. CTA is  recommended.     Findings called to patient's nurse Ms. Cortez, 12:13 AM.       MRI Thoracic Spine With & Without Contrast [[id number]] Collected:  01/24/18 0047     Updated:  01/24/18 0047    Narrative:       MRI OF THE THORACIC SPINE WITHOUT AND WITH CONTRAST.     TECHNIQUE: Multisequence multiplanar MRI images of the thoracic spine.     HISTORY: Mid back thoracic pain.     COMPARISON: No prior studies for comparison.     FINDINGS:  Vertebral body height and signal is normal, no acute fracture.   Intervertebral discs demonstrates mild desiccation changes in the  midthoracic spine. Small disc osteophyte is seen at T3-4 causing mild  spinal canal stenosis.     Thoracic spinal cord demonstrates normal caliber, no atrophy or  swelling.     Small left pleural effusion is incidentally noted.       Impression:       Mild spondylosis of the thoracic spine, small disc osteophyte complex at  T3-4. No fracture or dislocation.  Small left pleural effusion.       MRI Lumbar Spine With & Without Contrast [[id number]] Collected:  01/24/18 0827     Updated:  01/24/18 0827    Narrative:       MRI LUMBAR SPINE WITH AND WITHOUT CONTRAST     HISTORY:  Back pain. Confusion.     COMPARISON: No prior MRI of the lumbar spine is available for  comparison.     FINDINGS:     The study is hampered by patient motion. The alignment of the lumbar  spine is within normal limits. There is mild loss of disc height at  L4-L5 and L5-S1. The conus is at L1.     There is increased signal intensity involving the posterior paraspinal  musculature symmetrical extending from L1 to S1, nonspecific. There is a  small amount fluid present within the facet joints at L3-L4 and L4-L5  bilaterally.     There is heterogeneous appearance of the marrow diffusely with decreased  signal intensity involving the anterior aspect of T1 sequences without  any incident. This extends to and involves the upper thoracic spine. The  appearance of the upper thoracic spine is  similar to the MRI examination  of the cervical spine performed on 07/12/2013.      L1-L2: Mild facet degenerative disease is present bilaterally.     L2-L3: There is no osseous bulge or herniation.     L3-L4: Mild facet degenerative disease is present bilaterally. There is  no evidence of disc bulge or herniation.     L4-L5:  Moderate facet degenerative disease is present bilaterally.  There is a broad-based disc osteophyte complex resulting in mild  flattening of ventral surface of the thecal sac. There is mild neural  foraminal compromise bilaterally secondary to extension of a small disc  osteophyte complex into the neural foramen.     L5-S1: Moderate facet degenerative disease is present bilaterally. There  is a broad-based disc osteophyte complex resulting in mild flattening of  the ventral surface of the thecal sac, slightly more prominent to the  right.     The sagittal T1 postcontrast sequence demonstrates enhancement of the  cauda equina and a mildly increased signal intensity involving the CSF  within the inferior aspect of the thecal sac. There is also an epidural  fluid collection posterior to L5-S1. This measures approximately 11 mm  in AP dimension and 2 mm in AP dimension and is located at the midline.  There is enhancement of the posterior paraspinal musculature from L1-L2  to S1.     The axial T2 sequence demonstrates abnormal signal adjacent to the  kidneys bilaterally extending inferiorly, only partially visualized  suggesting perirenal fluid, not present on the CT examination of  01/20/2018.       Impression:       1.  The study is grossly abnormal with enhancement of cauda equina,  intermediate signal within the thecal sac and dural enhancement,  nonspecific. The intermediate signal may be secondary to blood products  although infection cannot be excluded. There is a thin plane of fluid  posterior to the thecal sac at L5-S1. The appearance is nonspecific.  This may be secondary to a small  seroma or posttraumatic fluid  collection although infection or abscess cannot be excluded. There is  edema and enhancement involving the paraspinal musculature bilaterally.  There is no evidence of edema or enhancement involving the vertebral  bodies or disc spaces. Fluid is present within the facet joints at L3-L4  and L4-L5 bilaterally. This may be degenerative in nature although  infected facets cannot be excluded.  2.  Abnormal signal in the perirenal space bilaterally new versus the CT  examination 01/20/2018. This is only partially visualized but may be  secondary to third spacing.     The above information was called to and discussed with Dr. Welch.       MRI Brain With & Without Contrast [[id number]] Collected:  01/24/18 1058     Updated:  01/24/18 1058    Narrative:       MRI EXAMINATION OF THE BRAIN WITH AND WITHOUT CONTRAST     HISTORY: Confusion, altered mental status.     COMPARISON: MRI of the brain 01/22/2018, MRA 01/23/2018 and CT angiogram  01/24/2018.     FINDINGS: There is no evidence of restricted diffusion to suggest acute  infarction.     The axial T2 FLAIR sequence demonstrates increased signal intensity  involving the sulci of the right frontal lobe laterally as well as  extending superiorly to the vertex, more prominent as compared to the T2  FLAIR examination performed on 01/22/2018. There is also increased  signal intensity involving the cortex of the right frontal lobe  laterally evident on the T2 and T2 FLAIR sequences. A similar process is  appreciated involving the left temporal lobe laterally, smaller in area.  There is increased signal intensity involving the sulci involving the  parietal lobes bilaterally, present previously.     There is increased signal intensity appreciated involving the basal  ganglion on the right inferiorly on the T2 FLAIR sequence. This area  measures approximately 9 mm in transverse dimension and is more  prominent as compared to the prior examination.  There are small foci of  increased signal intensity appreciated involving the white matter of the  cerebellar hemispheres bilaterally on the T2 FLAIR sequence which are  slightly more prominent as compared to prior examination.     The axial T2 FLAIR sequence demonstrates increased signal intensity  involving the sulci overlying the left frontal lobe laterally which is  new versus the MRI examination of 01/23/2018.     There is mild prominence of the lateral ventricles, similar in  appearance compared to prior examination.     The axial T1 precontrast sequence demonstrates a mild degree of  increased signal intensity involving the right frontal lobe laterally.  This may be related to the contrast which was administered on  01/22/2018. Petechial hemorrhage could also be considered but is less  likely. There is no evidence of signal loss on the susceptibility  weighted imaging at this location.     After contrast administration there was increased signal intensity  involving the cortex as well as sulci of the right frontal lobe  laterally and involving the left temporal lobe laterally to a lesser  extent. There is faint enhancement involving sulci in the left temporal  region laterally. There is enhancement involving the inferior aspect of  the basal ganglia on the right (approximately 6 mm) as well as  enhancement involving the left cerebellar hemisphere centrally (4-5 mm  in size). This cerebellar enhancement was not present previously. The  enhancement involving the basal ganglia on the right inferiorly was not  present previously.       Impression:       Complex and unusual presentation with multifocal areas of  sulcal T2 FLAIR hyperintensity as well as areas of T2 and T2 FLAIR  hyperintensity involving the right frontal lobe laterally, left temporal  lobe laterally, basal ganglia inferiorly and left cerebellar hemisphere  centrally. These areas are more prominent as compared to the prior  examination and there is  enhancement involving the left cerebral  hemisphere, basal ganglia on the right inferiorly and sulcal  hyperintensity evident on the postcontrast sequence as well. The  multivascular distribution suggests a multifocal process with breakdown  of the blood-brain barrier. This may be secondary to an inflammatory  infectious process such as meningitis or encephalitis. However no  convincing areas of T2 FLAIR hyperintensity are noted within the  dependent portions of the ventricular system or involving the basilar  cisterns. A vasculitis could also be considered. There is mild  prominence of the lateral ventricles, unchanged. There is no evidence of  restricted diffusion to suggest acute infarction.     The above information was called to and discussed with Dr. Welch.     CHECK CHECK           CT Angiogram Head With & Without Contrast [[id number]] Collected:  01/24/18 0818     Updated:  01/24/18 1108    Narrative:       CONTRAST-ENHANCED CT ANGIOGRAM OF THE HEAD AND NECK -  01/24/2018     HISTORY: Subarachnoid hemorrhage, evaluate for cerebral aneurysm and  vasospasm.     TECHNIQUE: Spiral CT images were obtained from the base of the skull to  the vertex both pre and post intravenous contrast, and images were  reformatted and submitted in 3 mm thick axial CT section with brain  algorithm, and additional spiral CT images were obtained from the top of  the aortic arch up through the great vessels in the head and neck during  the arterial phase of contrast, and images were reformatted and  submitted in 1 mm thick axial CT section, 1 mm thick sagittal and  coronal reconstructions and 3-D reconstructions were performed to  complete the CT angiogram of the head and neck.     There are no prior CTs of the head for comparison. This is correlated to  prior MRI of the brain on 01/22/2018 as well as MRA of the head on last  evening 01/23/2018 at 9:50 PM.     FINDINGS: There is a questionable very subtle 2 cm area of low-density  in  the inferior lateral right frontal cortex and juxtacortical white  matter.  This is an equivocal finding seen on precontrast axial CT  images 30 through 33.  The remainder of the brain parenchyma is normal  in attenuation. The ventricles are normal in size. I see no mass effect  and no midline shift and no extra-axial fluid collections are  identified. There is no evidence of acute intracranial hemorrhage. No  abnormal areas of enhancement are seen in the head.  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 The oral  pharynx, hypopharynx, true cords and subglottic airway are normal in  appearance; there is heterogeneous enhancement in the thyroid gland. The  lung apices are clear, there is small bilateral posterior layering  effusions and there is discoid area of low density projecting over the  superior aspect of the left major fissure that could be loculated fluid  in left major fissure. The parotid, , parapharyngeal,  submandibular spaces are symmetric and are normal in appearance. There  is an abnormal appearance to the C1-2 level with marked widening of the  atlantodental interval measuring 7 mm, and there appears to be bony  bridging from the posterior aspect of the anterior ring of C1 and the  anterior cortex of the odontoid and also some bony fusion across the  lateral masses of C1 and C2 bilaterally, and I suspect that this is  related to old trauma and as a result there is moderate narrowing of the  anterior posterior diameter of the canal at the C1 ring level. There is  disc space narrowing, degenerative endplate changes at C5-6, posterior  endplate spurring, mild canal narrowing and left uncovertebral joint  hypertrophy, moderate left bony foraminal narrowing at C5-6. There are  prominent osteoarthritic changes in the left glenohumeral joint with  joint space narrowing, moderate marginal  osteophytic spurring and there  is extensive fatty marrow replacement in the visualized proximal left  humerus and a coarse area of calcification in the proximal left humeral  metaphysis and humeral neck in the medullary cavity that measures 17 mm  in size, could be calcification, old bone infarct or an enchondroma.     CT angiogram images demonstrate common origin of left common carotid  artery and brachiocephalic artery off the aortic arch constituting a  bovine configuration to the aortic arch which is a normal anatomic  variation. The left subclavian artery origin is normal in appearance, no  stenosis is seen in left subclavian artery. The left vertebral artery  origin is normal in appearance, no stenosis is seen in the left  vertebral artery.  The left common carotid origin is normal in  appearance.  No stenosis is seen in the left common carotid artery, its  bifurcation on the left,  There is coarsely calcified atherosclerotic  plaque involving the inferior wall of the left common carotid  bifurcation, but there is no stenosis in the left common carotid  bifurcation.  There is no stenosis in the left internal carotid artery  using the NASCET criteria. Brachycephalic artery origin is normal in  appearance. No stenosis is seen in the brachycephalic artery, its  bifurcation into the right subclavian and common carotid artery is  normal in appearance, no stenosis is seen in the right subclavian  artery. The right vertebral artery origin is normal in appearance, no  stenosis is seen in the right vertebral artery from its origin to the  vertebrobasilar junction. The right common carotid origin is normal in  appearance, no stenosis is seen in the right common carotid artery, its  bifurcation into the right internal and external carotid arteries is  within normal limits and no stenosis is seen in the right internal  carotid artery using the NASCET criteria.     CT angiogram images through the head demonstrate a normal  appearance to  the visualized intracranial segments of the distal vertebral arteries  and normal appearance to the basilar artery and the basilar tip as well  as the posterior cerebral and superior cerebellar arteries bilaterally.  The upper cervical, petrous, cavernous and supracavernous segments of  the internal carotid arteries are within normal limits. There is an  atretic A1 segment of the right anterior cerebral artery which is a  normal anatomic variation.  The dominant left A1 segment plaque supplies  both A2 segments via a normal-appearing anterior communicating artery.  The M1 segments and middle cerebral arteries and the middle cerebral  artery trifurcations are within normal limits.       Impression:       1. The findings in the cervical spine are similar to prior cervical  spine MRI from [facility] on 07/12/2013. There is  marked widening of the atlantodental interval up to 7 mm with bony  bridging between the posterior cortex in the anterior ring C1 and the  anterior aspect of the odontoid, and there is some bony bridging and  fusion across the right left lateral masses of C1 and C2 and due to the  anterior translation of C1 on C2 there is moderate narrowing of the  anterior posterior diameter of the canal at the C1 ring level. This is  likely an old posttraumatic deformity and correlate with clinical  history.  Cervical spondylosis is present with posterior spurring and  uncovertebral joint hypertrophy contributing to mild canal and moderate  left foraminal narrowing at C5-6 which could affect the exiting left C6  nerve root  2. There is questionable very subtle faint 2 cm area of low-density in  the inferior lateral right frontal cortex corresponding to an area of  FLAIR hyperintensity on the MRA of the head 5 1/2 hours ago, last night,  01/23/2018 at 9:51 PM. The FLAIR images on the MRA of the head also  demonstrated FLAIR high signal in the right frontal sulci and lateral  left  temporal occipital sulci. The etiology of the FLAIR hyperintensity  on the MRA 5 1/2 hours ago is uncertain, and I recommend an MRI of the  brain with and without contrast to further evaluate and it can be  correlated to recent MRI of the head 01/22/2018.  I also recommend  correlation with recent lumbar puncture results.  3. There are small bilateral posterior layering effusions and some  loculated fluid in the superior aspect of the left major fissure, right  subclavian line is in place and its tip is not evaluated on this exam.  4. CT angiogram of the head and neck is otherwise within normal limits  with no stenosis seen in the great vessels in the head or neck and no  intracranial aneurysm identified.     The results of this and recommendations from this study were  communicated to Dr. Endy Welch by telephone on 01/24/2018 at 7:30 AM.     Radiation dose reduction techniques were utilized, including automated  exposure control and exposure modulation based on body size.     This report was finalized on 1/24/2018 11:05 AM by Dr. Javier Rodrigues MD.       CT Angiogram Neck With & Without Contrast [[id number]] Collected:  01/24/18 0818     Updated:  01/24/18 1108    Narrative:       CONTRAST-ENHANCED CT ANGIOGRAM OF THE HEAD AND NECK -  01/24/2018     HISTORY: Subarachnoid hemorrhage, evaluate for cerebral aneurysm and  vasospasm.     TECHNIQUE: Spiral CT images were obtained from the base of the skull to  the vertex both pre and post intravenous contrast, and images were  reformatted and submitted in 3 mm thick axial CT section with brain  algorithm, and additional spiral CT images were obtained from the top of  the aortic arch up through the great vessels in the head and neck during  the arterial phase of contrast, and images were reformatted and  submitted in 1 mm thick axial CT section, 1 mm thick sagittal and  coronal reconstructions and 3-D reconstructions were performed to  complete the CT angiogram of the head  and neck.     There are no prior CTs of the head for comparison. This is correlated to  prior MRI of the brain on 01/22/2018 as well as MRA of the head on last  evening 01/23/2018 at 9:50 PM.     FINDINGS: There is a questionable very subtle 2 cm area of low-density  in the inferior lateral right frontal cortex and juxtacortical white  matter.  This is an equivocal finding seen on precontrast axial CT  images 30 through 33.  The remainder of the brain parenchyma is normal  in attenuation. The ventricles are normal in size. I see no mass effect  and no midline shift and no extra-axial fluid collections are  identified. There is no evidence of acute intracranial hemorrhage. No  abnormal areas of enhancement are seen in the head.  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 The oral  pharynx, hypopharynx, true cords and subglottic airway are normal in  appearance; there is heterogeneous enhancement in the thyroid gland. The  lung apices are clear, there is small bilateral posterior layering  effusions and there is discoid area of low density projecting over the  superior aspect of the left major fissure that could be loculated fluid  in left major fissure. The parotid, , parapharyngeal,  submandibular spaces are symmetric and are normal in appearance. There  is an abnormal appearance to the C1-2 level with marked widening of the  atlantodental interval measuring 7 mm, and there appears to be bony  bridging from the posterior aspect of the anterior ring of C1 and the  anterior cortex of the odontoid and also some bony fusion across the  lateral masses of C1 and C2 bilaterally, and I suspect that this is  related to old trauma and as a result there is moderate narrowing of the  anterior posterior diameter of the canal at the C1 ring level. There is  disc space narrowing, degenerative endplate changes at  C5-6, posterior  endplate spurring, mild canal narrowing and left uncovertebral joint  hypertrophy, moderate left bony foraminal narrowing at C5-6. There are  prominent osteoarthritic changes in the left glenohumeral joint with  joint space narrowing, moderate marginal osteophytic spurring and there  is extensive fatty marrow replacement in the visualized proximal left  humerus and a coarse area of calcification in the proximal left humeral  metaphysis and humeral neck in the medullary cavity that measures 17 mm  in size, could be calcification, old bone infarct or an enchondroma.     CT angiogram images demonstrate common origin of left common carotid  artery and brachiocephalic artery off the aortic arch constituting a  bovine configuration to the aortic arch which is a normal anatomic  variation. The left subclavian artery origin is normal in appearance, no  stenosis is seen in left subclavian artery. The left vertebral artery  origin is normal in appearance, no stenosis is seen in the left  vertebral artery.  The left common carotid origin is normal in  appearance.  No stenosis is seen in the left common carotid artery, its  bifurcation on the left,  There is coarsely calcified atherosclerotic  plaque involving the inferior wall of the left common carotid  bifurcation, but there is no stenosis in the left common carotid  bifurcation.  There is no stenosis in the left internal carotid artery  using the NASCET criteria. Brachycephalic artery origin is normal in  appearance. No stenosis is seen in the brachycephalic artery, its  bifurcation into the right subclavian and common carotid artery is  normal in appearance, no stenosis is seen in the right subclavian  artery. The right vertebral artery origin is normal in appearance, no  stenosis is seen in the right vertebral artery from its origin to the  vertebrobasilar junction. The right common carotid origin is normal in  appearance, no stenosis is seen in the right  common carotid artery, its  bifurcation into the right internal and external carotid arteries is  within normal limits and no stenosis is seen in the right internal  carotid artery using the NASCET criteria.     CT angiogram images through the head demonstrate a normal appearance to  the visualized intracranial segments of the distal vertebral arteries  and normal appearance to the basilar artery and the basilar tip as well  as the posterior cerebral and superior cerebellar arteries bilaterally.  The upper cervical, petrous, cavernous and supracavernous segments of  the internal carotid arteries are within normal limits. There is an  atretic A1 segment of the right anterior cerebral artery which is a  normal anatomic variation.  The dominant left A1 segment plaque supplies  both A2 segments via a normal-appearing anterior communicating artery.  The M1 segments and middle cerebral arteries and the middle cerebral  artery trifurcations are within normal limits.       Impression:       1. The findings in the cervical spine are similar to prior cervical  spine MRI from [facility] on 07/12/2013. There is  marked widening of the atlantodental interval up to 7 mm with bony  bridging between the posterior cortex in the anterior ring C1 and the  anterior aspect of the odontoid, and there is some bony bridging and  fusion across the right left lateral masses of C1 and C2 and due to the  anterior translation of C1 on C2 there is moderate narrowing of the  anterior posterior diameter of the canal at the C1 ring level. This is  likely an old posttraumatic deformity and correlate with clinical  history.  Cervical spondylosis is present with posterior spurring and  uncovertebral joint hypertrophy contributing to mild canal and moderate  left foraminal narrowing at C5-6 which could affect the exiting left C6  nerve root  2. There is questionable very subtle faint 2 cm area of low-density in  the inferior lateral  right frontal cortex corresponding to an area of  FLAIR hyperintensity on the MRA of the head 5 1/2 hours ago, last night,  01/23/2018 at 9:51 PM. The FLAIR images on the MRA of the head also  demonstrated FLAIR high signal in the right frontal sulci and lateral  left temporal occipital sulci. The etiology of the FLAIR hyperintensity  on the MRA 5 1/2 hours ago is uncertain, and I recommend an MRI of the  brain with and without contrast to further evaluate and it can be  correlated to recent MRI of the head 01/22/2018.  I also recommend  correlation with recent lumbar puncture results.  3. There are small bilateral posterior layering effusions and some  loculated fluid in the superior aspect of the left major fissure, right  subclavian line is in place and its tip is not evaluated on this exam.  4. CT angiogram of the head and neck is otherwise within normal limits  with no stenosis seen in the great vessels in the head or neck and no  intracranial aneurysm identified.     The results of this and recommendations from this study were  communicated to Dr. Endy Welch by telephone on 01/24/2018 at 7:30 AM.     Radiation dose reduction techniques were utilized, including automated  exposure control and exposure modulation based on body size.     This report was finalized on 1/24/2018 11:05 AM by Dr. Javier Rodrigues MD.                Medications have been reviewed:  Current Facility-Administered Medications   Medication Dose Route Frequency Provider Last Rate Last Dose   • acetaminophen (TYLENOL) suppository 650 mg  650 mg Rectal Q4H PRN Raad Adams MD   650 mg at 01/23/18 2123   • acetaminophen (TYLENOL) tablet 650 mg  650 mg Oral Q4H PRN RAMANDEEP Pedraza        Or   • acetaminophen (TYLENOL) suppository 650 mg  650 mg Rectal Q4H PRN RAMANDEEP Pedraza       • acetaminophen (TYLENOL) tablet 650 mg  650 mg Oral Q6H PRN Hugo Elam MD   650 mg at 01/22/18 2051   • acyclovir (ZOVIRAX) 620 mg in sodium  chloride 0.9 % 100 mL IVPB  10 mg/kg Intravenous Q12H Dylon Andrews MD   620 mg at 01/24/18 0244   • amLODIPine (NORVASC) tablet 5 mg  5 mg Oral Q24H Elie Morales MD   Stopped at 01/24/18 1030   • ceFEPime (MAXIPIME) in SWFI 2g/10ml IV PUSH syringe  2 g Intravenous Q12H Dylon Andrews MD   2 g at 01/24/18 0602   • cycloSPORINE (RESTASIS) 0.05 % ophthalmic emulsion 1 drop  1 drop Both Eyes BID Hugo Elam MD   1 drop at 01/23/18 2122   • dextrose (D50W) solution 50 mL  50 mL Intravenous Q1H PRN Vinayak Feliciano MD   50 mL at 01/20/18 0003   • estradiol (ESTRACE) tablet 1 mg  1 mg Oral Daily Hugo Elam MD   1 mg at 01/22/18 0832   • Ferrex 150 forte plus capsule 1 capsule  1 capsule Oral Daily With Breakfast Hugo Elam MD   1 capsule at 01/22/18 0832   • ferrous sulfate tablet 325 mg  325 mg Oral BID With Meals Hugo Elam MD   Stopped at 01/23/18 1800   • gabapentin (NEURONTIN) capsule 200 mg  200 mg Oral Q12H Hugo Elam MD   Stopped at 01/23/18 1801   • Hold medication  1 each Does not apply Continuous PRN Dylon Andrews MD       • Hold medication  1 each Does not apply Continuous PRN Enmanuel Welch MD       • hydrALAZINE (APRESOLINE) injection 10 mg  10 mg Intravenous Q6H PRN Elie Morales MD       • HYDROcodone-acetaminophen (NORCO) 5-325 MG per tablet 1 tablet  1 tablet Oral Q6H PRN Hugo Elam MD   1 tablet at 01/22/18 0625   • metoprolol tartrate (LOPRESSOR) injection 5 mg  5 mg Intravenous Q6H PRN Elie Morales MD   5 mg at 01/23/18 1607   • ondansetron (ZOFRAN) injection 4 mg  4 mg Intravenous Q6H PRN RAMANDEEP Pedraza       • pantoprazole (PROTONIX) injection 40 mg  40 mg Intravenous BID AC Elie Morales MD   40 mg at 01/23/18 0908   • Pharmacy to Dose acyclovir (ZOVIRAX)   Does not apply Continuous PRN Dylon Andrews MD       • Pharmacy to dose vancomycin   Does not apply Continuous PRN Dylon  Dami Andrews MD       • potassium chloride (MICRO-K) CR capsule 40 mEq  40 mEq Oral PRN Elie Mroales MD        Or   • potassium chloride (KLOR-CON) packet 40 mEq  40 mEq Oral PRN Elie Morales MD        Or   • potassium chloride 10 mEq in 100 mL IVPB  10 mEq Intravenous Q1H PRN Elie Morales  mL/hr at 01/24/18 1323 10 mEq at 01/24/18 1323   • potassium chloride (MICRO-K) CR capsule 40 mEq  40 mEq Oral Once Kip Roberson MD       • sodium chloride 0.9 % flush 1-10 mL  1-10 mL Intravenous PRN RAMANDEEP Pedraza       • sodium chloride 0.9 % flush 10 mL  10 mL Intracatheter Q12H Raad Adams MD   10 mL at 01/23/18 2124   • sodium chloride 0.9 % flush 10 mL  10 mL Intracatheter PRN Raad Adams MD       • sodium chloride 0.9 % infusion  75 mL/hr Intravenous Continuous Kip Roberson MD 75 mL/hr at 01/23/18 2122 75 mL/hr at 01/23/18 2122   • temazepam (RESTORIL) capsule 15 mg  15 mg Oral Nightly PRN Hugo Elam MD   15 mg at 01/22/18 2051   • vancomycin (VANCOCIN) in iso-osmotic dextrose IVPB 1 g (premix) 200 mL  1,000 mg Intravenous Q24H Elie Morales MD   1,000 mg at 01/23/18 1607       Assessment/Plan     Principal Problem:    FUO (fever of unknown origin)  Active Problems:    Hyperkalemia    Hyponatremia    JEFFERY (acute kidney injury)    Anemia    GI bleed    Cellulitis      Assessment:  (Assesment  JEFFERY secondary to decreased intravascular volume and sespsis secondary to cellulitis , stable, creat 1.1    Chronic kidney disease, recent baseline creatinine around 1.4, below prior baseline now     HYperkalemia , improved, now low     Hyponatremia , hypovolemic, better today with fluids     LE cellulitis     Metabolic acidosis, improved after bicarbonate drip     RA     HTN         Plan:  Renal function remains stable, slowly improved  Volume and electrolytes stable today  Potassium is being replaced  Continue maintenance fluids until oral intake improves  Ongoing neurologic workup  continues, subarachnoid hemorrhage felt to be unlikely at this point  Noted plans for repeat lumbar puncture ).             Continue to monitor renal function, electroytes and volume closely   Please call me with any questions or concerns      Kip Roberson MD   [facility]   [phone]    01/24/18  1:35 PM      Dictation performed using Dragon dictation software

## 2018-01-24 NOTE — PLAN OF CARE
"Problem: Patient Care Overview (Adult)  Goal: Plan of Care Review   01/22/18 1557 01/24/18 0847   Coping/Psychosocial Response Interventions   Plan Of Care Reviewed With --  patient;caregiver   Patient Care Overview   Progress no change --    Outcome Evaluation   Outcome Summary/Follow up Plan --  Pt would moan and at times I heard \"ouch\" when moving her. Pt had 2 runny BMs, moderate size. Pt did open eyes for eyedrops. Will not keep them open only a short time. Sister at bedside very knowledgeable and is a RN. Pt has blisters on the corners of her mouth that were bleeding at times through the night. Pt had temp of 100.3 gave Tylenol suppository. Pt temp went down and throughout the night. Pt seems to have more pain in her legs. According to pt sister RLE is fractured. LUE seems a little weak. Tachycardia cont. MRI showed Subarachnoid hemorrhage. CTA ordered, confirmed. Orders to move pt to ICU, Neurosurgey consulted per Neurology. Pt was rested well, but could be awaken by name spoken.Used bed side suction to clear secretions. IV fluids at 75ml/hr. PICC is clean and flushes good blood return. Blister on her left shin, mepilex applied. Pt sister is very involved in the care of pt. Neurology and cardiology aware of pt condition. Pt is transfering to ICU this morning.          "

## 2018-01-24 NOTE — PROGRESS NOTES
"Progress Note      PATIENT Domitila Valera    1955   MRN [id number]   ADMIT DATE 2018   LENGTH OF STAY 5 days   ATTENDING Elie Morales MD       CHIEF COMPLAINT: Fever (last tyl 1145) and Foot Pain (r/t arthritis)      DIAGNOSIS: FUO (fever of unknown origin) [R50.9]    HISTORY OF PRESENT ILLNESS: According to the family there is been no significant change in the patient's condition over the last 24 hours.  In particular she still minimally responsive.  Patient of course has no complaints.    PHYSICAL EXAMINATION:  GENERAL: Reveals a man/woman who appears his/her stated age, in no acute distress.  VITAL SIGNS: /89 (BP Location: Left arm, Patient Position: Lying)  Pulse 109  Temp 98.4 °F (36.9 °C) (Oral)   Resp 16  Ht 149.9 cm (59\")  Wt 67.6 kg (149 lb)  SpO2 98%  BMI 30.09 kg/m2  NECK: Carotids are equal without bruits.   HEART: Regular rate and rhythm with no significant murmur or gallop.   EXTREMITIES: Nonedematous. Pulses are intact. There is no rash. There is no hepatosplenomegaly.   NEUROLOGIC: Patient is lying in bed with her eyes closed if vigorously stimulated she'll open her eyes briefly.  She has full horizontal eye movements pupils are equal and reactive.  She grimaces bilaterally to supraorbital pain.  Moves all 4 extremities to painful stimulation with no clear weakness.    DIAGNOSTIC DATA: The patient and MR a yesterday which was poor quality from a vascular point review the raise a question of subarachnoid hemorrhage.  However this was a very limited MRI of the brain (since it was actually MRA) so after discussing it with the radiologist legs performed an MRI of the brain with and without contrast today.    MR eyes quite interesting.  There is definitely high intensity in the subarachnoid space and the adjacent cortex predominantly in the right frontal and temporal regions but also to a lesser degree on the left.  There is also some high intensity in the right basal ganglia and " small area in the cerebellum.  There is no evidence of stroke on diffusion-weighted imaging.  There is some degree of enhancement in the areas of subarachnoid space hyper intensity.  No blood on gradient echo.  In addition the CT scan done as part of his CTA did not show any blood.    IMPRESSION AND PLAN: I'm not sure was going on with Mrs. Valera.  I don't think this is subarachnoid hemorrhage.  It could be a viral encephalitis with some his of involvement in the brain on MRI.  Not I'll can be invasive like this as can other flavi viruses.  I doubt this is herpes but the PCR is pending.  Talk with Dr. Fish and he raised a question of rheumatoid involvement of the brain.  One can see a pachymeningitis with rheumatoid is quite uncommon but this situation it's a consideration.  One could consider a vasculitis but there is no areas of infarction so I wouldn't expect vasculitis to cause cognitive decline without some areas of infarction.  Addition the CTA showed no signs of vasculitis or any other vascular process.    Dr. Guerrero radiology feels confident he can obtain a large CSF sample for us.  The family was somewhat hesitant to go for a third lumbar puncture but they agreed.  Hopefully we can get a larger sample of CSF simply without any blood.  I agree with continuing the acyclovir for now.  The CSF is not suggestive of any significant process we could consider high-dose steroids for rheumatoid pachymeningitis.        Enmanuel Welch MD  1/24/2018  1:19 PM

## 2018-01-24 NOTE — SIGNIFICANT NOTE
01/24/18 0946   Rehab Treatment   Discipline occupational therapist   Rehab Evaluation   Evaluation Not Performed unable to evaluate, medical status change  (nursing refuses OT at this time. States possible transfer to ICU. 946)

## 2018-01-24 NOTE — PLAN OF CARE
Problem: Patient Care Overview (Adult)  Goal: Plan of Care Review  Outcome: Ongoing (interventions implemented as appropriate)      Problem: Infection, Risk/Actual (Adult)  Goal: Identify Related Risk Factors and Signs and Symptoms  Outcome: Ongoing (interventions implemented as appropriate)    Goal: Infection Prevention/Resolution  Outcome: Ongoing (interventions implemented as appropriate)      Problem: Fall Risk (Adult)  Goal: Absence of Falls  Outcome: Ongoing (interventions implemented as appropriate)      Problem: Pain, Acute (Adult)  Goal: Acceptable Pain Control/Comfort Level  Outcome: Ongoing (interventions implemented as appropriate)      Problem: Pressure Ulcer Risk (Checo Scale) (Adult,Obstetrics,Pediatric)  Goal: Skin Integrity  Outcome: Ongoing (interventions implemented as appropriate)      Problem: Skin Integrity Impairment, Risk/Actual (Adult)  Goal: Identify Related Risk Factors and Signs and Symptoms  Outcome: Ongoing (interventions implemented as appropriate)    Goal: Skin Integrity/Wound Healing  Outcome: Ongoing (interventions implemented as appropriate)      Problem: Confusion, Acute (Adult)  Goal: Identify Related Risk Factors and Signs and Symptoms  Outcome: Ongoing (interventions implemented as appropriate)    Goal: Cognitive/Functional Impairments Minimized  Outcome: Ongoing (interventions implemented as appropriate)    Goal: Safety  Outcome: Ongoing (interventions implemented as appropriate)

## 2018-01-24 NOTE — SIGNIFICANT NOTE
Pt currently off the floor to MRI. VIOLA Alvares reports pt not appropriate for PT today and is likely to transfer to ICU soon.

## 2018-01-24 NOTE — SIGNIFICANT NOTE
01/24/18 0935   Rehab Treatment   Discipline speech language pathologist   Rehab Evaluation   Evaluation Not Performed other (see comments)  (Discussed POC with RN. Per RN CTA/MRA, pt with SAH. Pt likely transferring to ICU. Pt not alert at this time. ST to s/o, please reconsult as pt appropriate. RN in agreement. )

## 2018-01-24 NOTE — PROGRESS NOTES
"DAILY PROGRESS NOTE  [facility]    Patient Identification:  Name: Domitila Valera  Age: 62 y.o.  Sex: female  :  1955  MRN: [id number]         Primary Care Physician: Javier Soriano III, MD    Subjective:  Interval History:She complains of pain in feet.  She is more confused.    Objective:    Scheduled Meds:    acyclovir 10 mg/kg Intravenous Q12H   amLODIPine 5 mg Oral Q24H   cefepime 2 g Intravenous Q12H   cycloSPORINE 1 drop Both Eyes BID   estradiol 1 mg Oral Daily   Ferrex 150 forte plus 1 capsule Oral Daily With Breakfast   ferrous sulfate 325 mg Oral BID With Meals   gabapentin 200 mg Oral Q12H   pantoprazole 40 mg Intravenous BID AC   potassium chloride 40 mEq Oral Once   sodium chloride 10 mL Intracatheter Q12H   vancomycin 1,000 mg Intravenous Q24H     Continuous Infusions:    hold 1 each    hold 1 each    Pharmacy to Dose acyclovir (ZOVIRAX)     Pharmacy to dose vancomycin     sodium chloride 75 mL/hr Last Rate: 75 mL/hr (18)       Vital signs in last 24 hours:  Temp:  [98.4 °F (36.9 °C)-100.3 °F (37.9 °C)] 98.4 °F (36.9 °C)  Heart Rate:  [109-119] 109  Resp:  [16] 16  BP: (129-143)/(89-97) 129/89    Intake/Output:  No intake or output data in the 24 hours ending 18 1348    Exam:  /89 (BP Location: Left arm, Patient Position: Lying)  Pulse 109  Temp 98.4 °F (36.9 °C) (Oral)   Resp 16  Ht 149.9 cm (59\")  Wt 67.6 kg (149 lb)  SpO2 98%  BMI 30.09 kg/m2    General Appearance:    Alert, cooperative, no distress   Head:    Normocephalic, without obvious abnormality, atraumatic   Eyes:       Throat:   Lips, tongue, gums normal   Neck:   Supple, symmetrical, trachea midline, no JVD   Lungs:     Clear to auscultation bilaterally, respirations unlabored   Chest Wall:    No tenderness or deformity    Heart:    Regular rate and rhythm, S1 and S2 normal, no murmur,no  Rub or gallop   Abdomen:     Soft, non-tender, bowel sounds active, no masses, no organomegaly  "   Extremities:   Extremities normal, atraumatic, no cyanosis or edema, cellulitis in lower extremity.   Pulses:      Skin:   Skin is warm and dry,  no rashes or palpable lesions   Neurologic:   no focal deficits noted      [unfilled]  Data Review:    Results from last 7 days  Lab Units 01/24/18  0249 01/23/18  0542 01/22/18  0455   SODIUM mmol/L 143 137 134*   POTASSIUM mmol/L 3.2* 3.4* 3.7   CHLORIDE mmol/L 113* 105 98   CO2 mmol/L 18.3* 20.6* 21.7*   BUN mg/dL 28* 26* 32*   CREATININE mg/dL 1.04* 1.04* 1.13*   GLUCOSE mg/dL 80 73 78   CALCIUM mg/dL 6.6* 6.8* 7.7*       Results from last 7 days  Lab Units 01/24/18  0249 01/23/18  0542 01/22/18  0455   WBC 10*3/mm3 2.75* 3.39* 3.85*   HEMOGLOBIN g/dL 7.3* 7.4* 8.0*   HEMATOCRIT % 24.3* 24.2* 26.1*   PLATELETS 10*3/mm3 69* 98* 120*       Results from last 7 days  Lab Units 01/24/18  0249   TSH mIU/mL 0.295       Results from last 7 days  Lab Units 01/24/18  0249   HEMOGLOBIN A1C % 5.60     No results found for: TROPONINT    Results from last 7 days  Lab Units 01/24/18  0249   CHOLESTEROL mg/dL 102   TRIGLYCERIDES mg/dL 347*   HDL CHOL mg/dL 9*       Results from last 7 days  Lab Units 01/23/18  0542 01/22/18  0455 01/21/18  0541   ALK PHOS U/L 268* 346* 392*   BILIRUBIN mg/dL 0.4 0.4 0.4   ALT (SGPT) U/L 14 19 20   AST (SGOT) U/L 40* 56* 57*       Results from last 7 days  Lab Units 01/24/18  0249   TSH mIU/mL 0.295       Results from last 7 days  Lab Units 01/24/18  0249   HEMOGLOBIN A1C % 5.60     No results found for: POCGLU        Patient Active Problem List   Diagnosis Code   • FUO (fever of unknown origin) R50.9   • Hyperkalemia E87.5   • Benign essential HTN I10   • Cellulitis of right upper arm L03.113   • Hyponatremia E87.1   • JEFFERY (acute kidney injury) N17.9   • Anemia D64.9   • GI bleed K92.2   • Cellulitis L03.90   • Thrombocytopenia D69.6       Assessment:  Active Hospital Problems (** Indicates Principal Problem)    Diagnosis Date Noted   • **FUO (fever  of unknown origin) [R50.9] 01/19/2018   • Thrombocytopenia [D69.6] 01/24/2018   • Cellulitis [L03.90] 01/22/2018   • GI bleed [K92.2] 01/21/2018   • Anemia [D64.9] 01/20/2018   • Hyperkalemia [E87.5] 01/19/2018   • Hyponatremia [E87.1] 01/19/2018   • JEFFERY (acute kidney injury) [N17.9] 01/19/2018      Resolved Hospital Problems    Diagnosis Date Noted Date Resolved   No resolved problems to display.       Plan:  ID consult noted.Neuro and cardiology consults noted  Work up in progress  plans and antibiotics per  ID.    Protonix IV   To get repeat LP . Get another unit PRBC  Elie Morales MD  1/24/2018  1:48 PM

## 2018-01-25 ENCOUNTER — APPOINTMENT (OUTPATIENT)
Dept: GENERAL RADIOLOGY | Facility: HOSPITAL | Age: 63
End: 2018-01-25
Attending: PSYCHIATRY & NEUROLOGY

## 2018-01-25 ENCOUNTER — APPOINTMENT (OUTPATIENT)
Dept: GENERAL RADIOLOGY | Facility: HOSPITAL | Age: 63
End: 2018-01-25
Attending: HOSPITALIST

## 2018-01-25 ENCOUNTER — APPOINTMENT (OUTPATIENT)
Dept: GENERAL RADIOLOGY | Facility: HOSPITAL | Age: 63
End: 2018-01-25

## 2018-01-25 ENCOUNTER — APPOINTMENT (OUTPATIENT)
Dept: GENERAL RADIOLOGY | Facility: HOSPITAL | Age: 63
End: 2018-01-25
Attending: INTERNAL MEDICINE

## 2018-01-25 PROBLEM — I48.0 PAROXYSMAL ATRIAL FIBRILLATION: Status: ACTIVE | Noted: 2018-01-25

## 2018-01-25 LAB
ABO + RH BLD: NORMAL
ABO + RH BLD: NORMAL
ALBUMIN SERPL-MCNC: 1.7 G/DL (ref 3.5–5.2)
ALBUMIN/GLOB SERPL: 0.4 G/DL
ALP SERPL-CCNC: 277 U/L (ref 39–117)
ALT SERPL W P-5'-P-CCNC: 14 U/L (ref 1–33)
ANION GAP SERPL CALCULATED.3IONS-SCNC: 10.4 MMOL/L
APPEARANCE CSF: ABNORMAL
ARTERIAL PATENCY WRIST A: POSITIVE
ARTERIAL PATENCY WRIST A: POSITIVE
AST SERPL-CCNC: 55 U/L (ref 1–32)
ATMOSPHERIC PRESS: 759 MMHG
ATMOSPHERIC PRESS: 759.6 MMHG
BASE EXCESS BLDA CALC-SCNC: -3.6 MMOL/L (ref 0–2)
BASE EXCESS BLDA CALC-SCNC: -4.3 MMOL/L (ref 0–2)
BDY SITE: ABNORMAL
BDY SITE: ABNORMAL
BH BB BLOOD EXPIRATION DATE: NORMAL
BH BB BLOOD EXPIRATION DATE: NORMAL
BH BB BLOOD TYPE BARCODE: 5100
BH BB BLOOD TYPE BARCODE: 8400
BH BB DISPENSE STATUS: NORMAL
BH BB DISPENSE STATUS: NORMAL
BH BB PRODUCT CODE: NORMAL
BH BB PRODUCT CODE: NORMAL
BH BB UNIT NUMBER: NORMAL
BH BB UNIT NUMBER: NORMAL
BILIRUB SERPL-MCNC: 0.4 MG/DL (ref 0.1–1.2)
BUN BLD-MCNC: 30 MG/DL (ref 8–23)
BUN/CREAT SERPL: 27.5 (ref 7–25)
C GATTII+NEOFOR DNA CSF QL NAA+NON-PROBE: NOT DETECTED
CALCIUM SPEC-SCNC: 7.3 MG/DL (ref 8.6–10.5)
CENTROMERE B AB SER-ACNC: ABNORMAL AI
CHLORIDE SERPL-SCNC: 115 MMOL/L (ref 98–107)
CHROMATIN AB SERPL-ACNC: >8 AI (ref 0–0.9)
CK SERPL-CCNC: 152 U/L (ref 20–180)
CMV DNA CSF QL NAA+PROBE: NOT DETECTED
CO2 SERPL-SCNC: 19.6 MMOL/L (ref 22–29)
COLOR CSF: ABNORMAL
COLOR SPUN CSF: ABNORMAL
CREAT BLD-MCNC: 1.09 MG/DL (ref 0.57–1)
CRP SERPL-MCNC: 7.78 MG/DL (ref 0–0.5)
DEPRECATED RDW RBC AUTO: 63.1 FL (ref 37–54)
DSDNA AB SER-ACNC: >300 IU/ML (ref 0–9)
E COLI K1 DNA CSF QL NAA+NON-PROBE: NOT DETECTED
ENA JO1 AB SER-ACNC: <0.2 AI (ref 0–0.9)
ENA RNP AB SER-ACNC: >8 AI (ref 0–0.9)
ENA SCL70 AB SER-ACNC: <0.2 AI (ref 0–0.9)
ENA SM AB SER-ACNC: >8 AI (ref 0–0.9)
ENA SS-A AB SER-ACNC: 0.7 AI (ref 0–0.9)
ENA SS-B AB SER-ACNC: <0.2 AI (ref 0–0.9)
ERYTHROCYTE [DISTWIDTH] IN BLOOD BY AUTOMATED COUNT: 23.4 % (ref 11.7–13)
ERYTHROCYTE [SEDIMENTATION RATE] IN BLOOD: 78 MM/HR (ref 0–30)
EV RNA CSF QL NAA+PROBE: NOT DETECTED
GAS FLOW AIRWAY: 15 LPM
GFR SERPL CREATININE-BSD FRML MDRD: 61 ML/MIN/1.73
GLOBULIN UR ELPH-MCNC: 4.5 GM/DL
GLUCOSE BLD-MCNC: 103 MG/DL (ref 65–99)
GLUCOSE BLDC GLUCOMTR-MCNC: 106 MG/DL (ref 70–130)
GLUCOSE BLDC GLUCOMTR-MCNC: 111 MG/DL (ref 70–130)
GLUCOSE BLDC GLUCOMTR-MCNC: 92 MG/DL (ref 70–130)
GLUCOSE CSF-MCNC: 79 MG/DL (ref 40–70)
GP B STREP DNA SPEC QL NAA+PROBE: NOT DETECTED
HAEM INFLU SEROTYP DNA SPEC NAA+PROBE: NOT DETECTED
HCO3 BLDA-SCNC: 19.1 MMOL/L (ref 22–28)
HCO3 BLDA-SCNC: 21.3 MMOL/L (ref 22–28)
HCT VFR BLD AUTO: 29.3 % (ref 35.6–45.5)
HGB BLD-MCNC: 9.1 G/DL (ref 11.9–15.5)
HHV6 DNA CSF QL NAA+PROBE: NOT DETECTED
HOROWITZ INDEX BLD+IHG-RTO: 100 %
HSV1 DNA CSF QL NAA+PROBE: NOT DETECTED
HSV1 DNA SPEC QL NAA+PROBE: NEGATIVE
HSV2 DNA CSF QL NAA+PROBE: NOT DETECTED
HSV2 DNA SPEC QL NAA+PROBE: NEGATIVE
L MONOCYTOG RRNA SPEC QL PROBE: NOT DETECTED
LYMPHOCYTES NFR CSF MANUAL: 51 %
Lab: ABNORMAL
MAGNESIUM SERPL-MCNC: 1.9 MG/DL (ref 1.6–2.4)
MCH RBC QN AUTO: 23.5 PG (ref 26.9–32)
MCHC RBC AUTO-ENTMCNC: 31.1 G/DL (ref 32.4–36.3)
MCV RBC AUTO: 75.7 FL (ref 80.5–98.2)
METHOD: ABNORMAL
MODALITY: ABNORMAL
MODALITY: ABNORMAL
MONOCYTES NFR CSF MANUAL: 5 %
N MEN DNA SPEC QL NAA+PROBE: NOT DETECTED
NEUTROPHILS NFR CSF MICRO: 44 %
NRBC NFR CSF: 1 /100 WBCS
NUC CELL # CSF MANUAL: 123 /MM3 (ref 0–5)
O2 A-A PPRESDIFF RESPIRATORY: 0.7 MMHG
PARECHOVIRUS A RNA CSF QL NAA+NON-PROBE: NOT DETECTED
PCO2 BLDA: 28.7 MM HG (ref 35–45)
PCO2 BLDA: 37.1 MM HG (ref 35–45)
PEEP RESPIRATORY: 5 CM[H2O]
PH BLDA: 7.37 PH UNITS (ref 7.35–7.45)
PH BLDA: 7.43 PH UNITS (ref 7.35–7.45)
PLATELET # BLD AUTO: 94 10*3/MM3 (ref 140–500)
PO2 BLDA: 502.6 MM HG (ref 80–100)
PO2 BLDA: 81.9 MM HG (ref 80–100)
POTASSIUM BLD-SCNC: 3.1 MMOL/L (ref 3.5–5.2)
PROT CSF-MCNC: 463 MG/DL (ref 15–45)
PROT SERPL-MCNC: 6.2 G/DL (ref 6–8.5)
RBC # BLD AUTO: 3.87 10*6/MM3 (ref 3.9–5.2)
RBC # CSF MANUAL: ABNORMAL /MM3 (ref 0–0)
S PNEUM DNA CSF QL NAA+NON-PROBE: NOT DETECTED
SAO2 % BLDCOA: 100 % (ref 92–99)
SAO2 % BLDCOA: 96.5 % (ref 92–99)
SET MECH RESP RATE: 16
SET MECH RESP RATE: 32
SODIUM BLD-SCNC: 145 MMOL/L (ref 136–145)
TOTAL RATE: 16 BREATHS/MINUTE
TOTAL RATE: 32 BREATHS/MINUTE
TUBE # CSF: 2
UNIT  ABO: NORMAL
UNIT  ABO: NORMAL
UNIT  RH: NORMAL
UNIT  RH: NORMAL
VENTILATOR MODE: AC
VT ON VENT VENT: 450 ML
VZV DNA CSF QL NAA+PROBE: NOT DETECTED
WBC NRBC COR # BLD: 3.31 10*3/MM3 (ref 4.5–10.7)

## 2018-01-25 PROCEDURE — B01B1ZZ FLUOROSCOPY OF SPINAL CORD USING LOW OSMOLAR CONTRAST: ICD-10-PCS | Performed by: RADIOLOGY

## 2018-01-25 PROCEDURE — 94799 UNLISTED PULMONARY SVC/PX: CPT

## 2018-01-25 PROCEDURE — 25010000002 CEFEPIME PER 500 MG: Performed by: INTERNAL MEDICINE

## 2018-01-25 PROCEDURE — 25010000002 PIPERACILLIN SOD-TAZOBACTAM PER 1 G: Performed by: INTERNAL MEDICINE

## 2018-01-25 PROCEDURE — 71045 X-RAY EXAM CHEST 1 VIEW: CPT

## 2018-01-25 PROCEDURE — 93010 ELECTROCARDIOGRAM REPORT: CPT | Performed by: INTERNAL MEDICINE

## 2018-01-25 PROCEDURE — 80053 COMPREHEN METABOLIC PANEL: CPT | Performed by: HOSPITALIST

## 2018-01-25 PROCEDURE — 88185 FLOWCYTOMETRY/TC ADD-ON: CPT | Performed by: INTERNAL MEDICINE

## 2018-01-25 PROCEDURE — 85652 RBC SED RATE AUTOMATED: CPT | Performed by: PSYCHIATRY & NEUROLOGY

## 2018-01-25 PROCEDURE — 86140 C-REACTIVE PROTEIN: CPT | Performed by: PSYCHIATRY & NEUROLOGY

## 2018-01-25 PROCEDURE — 84157 ASSAY OF PROTEIN OTHER: CPT | Performed by: PSYCHIATRY & NEUROLOGY

## 2018-01-25 PROCEDURE — 25010000003 POTASSIUM CHLORIDE 10 MEQ/100ML SOLUTION: Performed by: HOSPITALIST

## 2018-01-25 PROCEDURE — 25010000002 FENTANYL CITRATE (PF) 250 MCG/5ML SOLUTION: Performed by: INTERNAL MEDICINE

## 2018-01-25 PROCEDURE — 87483 CNS DNA AMP PROBE TYPE 12-25: CPT | Performed by: PSYCHIATRY & NEUROLOGY

## 2018-01-25 PROCEDURE — 36600 WITHDRAWAL OF ARTERIAL BLOOD: CPT

## 2018-01-25 PROCEDURE — 009U3ZX DRAINAGE OF SPINAL CANAL, PERCUTANEOUS APPROACH, DIAGNOSTIC: ICD-10-PCS | Performed by: RADIOLOGY

## 2018-01-25 PROCEDURE — 0BH17EZ INSERTION OF ENDOTRACHEAL AIRWAY INTO TRACHEA, VIA NATURAL OR ARTIFICIAL OPENING: ICD-10-PCS | Performed by: INTERNAL MEDICINE

## 2018-01-25 PROCEDURE — 85027 COMPLETE CBC AUTOMATED: CPT | Performed by: HOSPITALIST

## 2018-01-25 PROCEDURE — 82803 BLOOD GASES ANY COMBINATION: CPT

## 2018-01-25 PROCEDURE — 99233 SBSQ HOSP IP/OBS HIGH 50: CPT | Performed by: INTERNAL MEDICINE

## 2018-01-25 PROCEDURE — 82945 GLUCOSE OTHER FLUID: CPT | Performed by: PSYCHIATRY & NEUROLOGY

## 2018-01-25 PROCEDURE — 88184 FLOWCYTOMETRY/ TC 1 MARKER: CPT | Performed by: INTERNAL MEDICINE

## 2018-01-25 PROCEDURE — 82550 ASSAY OF CK (CPK): CPT | Performed by: PSYCHIATRY & NEUROLOGY

## 2018-01-25 PROCEDURE — 25010000002 PROPOFOL 10 MG/ML EMULSION

## 2018-01-25 PROCEDURE — 25010000002 FENTANYL CITRATE (PF) 100 MCG/2ML SOLUTION: Performed by: INTERNAL MEDICINE

## 2018-01-25 PROCEDURE — 5A1945Z RESPIRATORY VENTILATION, 24-96 CONSECUTIVE HOURS: ICD-10-PCS | Performed by: INTERNAL MEDICINE

## 2018-01-25 PROCEDURE — 25010000002 METHYLPREDNISOLONE: Performed by: PSYCHIATRY & NEUROLOGY

## 2018-01-25 PROCEDURE — 88189 FLOWCYTOMETRY/READ 16 & >: CPT | Performed by: INTERNAL MEDICINE

## 2018-01-25 PROCEDURE — 94002 VENT MGMT INPAT INIT DAY: CPT

## 2018-01-25 PROCEDURE — 99233 SBSQ HOSP IP/OBS HIGH 50: CPT | Performed by: PSYCHIATRY & NEUROLOGY

## 2018-01-25 PROCEDURE — 82962 GLUCOSE BLOOD TEST: CPT

## 2018-01-25 PROCEDURE — 25010000002 ACYCLOVIR PER 5 MG: Performed by: INTERNAL MEDICINE

## 2018-01-25 PROCEDURE — 99231 SBSQ HOSP IP/OBS SF/LOW 25: CPT | Performed by: INTERNAL MEDICINE

## 2018-01-25 PROCEDURE — 74018 RADEX ABDOMEN 1 VIEW: CPT

## 2018-01-25 PROCEDURE — 25010000002 PROPOFOL 1000 MG/ML EMULSION: Performed by: INTERNAL MEDICINE

## 2018-01-25 PROCEDURE — 87899 AGENT NOS ASSAY W/OPTIC: CPT | Performed by: INTERNAL MEDICINE

## 2018-01-25 PROCEDURE — 83735 ASSAY OF MAGNESIUM: CPT | Performed by: HOSPITALIST

## 2018-01-25 PROCEDURE — 93005 ELECTROCARDIOGRAM TRACING: CPT | Performed by: HOSPITALIST

## 2018-01-25 PROCEDURE — 77003 FLUOROGUIDE FOR SPINE INJECT: CPT

## 2018-01-25 PROCEDURE — 89051 BODY FLUID CELL COUNT: CPT | Performed by: PSYCHIATRY & NEUROLOGY

## 2018-01-25 RX ORDER — FENTANYL CITRATE 50 UG/ML
25 INJECTION, SOLUTION INTRAMUSCULAR; INTRAVENOUS
Status: DISCONTINUED | OUTPATIENT
Start: 2018-01-25 | End: 2018-01-27 | Stop reason: HOSPADM

## 2018-01-25 RX ORDER — LIDOCAINE HYDROCHLORIDE 10 MG/ML
10 INJECTION, SOLUTION INFILTRATION; PERINEURAL ONCE
Status: COMPLETED | OUTPATIENT
Start: 2018-01-25 | End: 2018-01-25

## 2018-01-25 RX ORDER — GABAPENTIN 250 MG/5ML
200 SOLUTION ORAL EVERY 12 HOURS SCHEDULED
Status: DISCONTINUED | OUTPATIENT
Start: 2018-01-25 | End: 2018-01-27 | Stop reason: HOSPADM

## 2018-01-25 RX ORDER — PROPOFOL 10 MG/ML
VIAL (ML) INTRAVENOUS
Status: COMPLETED
Start: 2018-01-25 | End: 2018-01-25

## 2018-01-25 RX ADMIN — POTASSIUM CHLORIDE 10 MEQ: 7.46 INJECTION, SOLUTION INTRAVENOUS at 12:44

## 2018-01-25 RX ADMIN — TAZOBACTAM SODIUM AND PIPERACILLIN SODIUM 3.38 G: 375; 3 INJECTION, SOLUTION INTRAVENOUS at 18:30

## 2018-01-25 RX ADMIN — CYCLOSPORINE 1 DROP: 0.5 EMULSION OPHTHALMIC at 12:35

## 2018-01-25 RX ADMIN — METHYLPREDNISOLONE SODIUM SUCCINATE 1 G: 1 INJECTION, POWDER, FOR SOLUTION INTRAMUSCULAR; INTRAVENOUS at 18:24

## 2018-01-25 RX ADMIN — LIDOCAINE HYDROCHLORIDE 9 ML: 10 INJECTION, SOLUTION INFILTRATION; PERINEURAL at 09:54

## 2018-01-25 RX ADMIN — Medication 10 ML: at 21:00

## 2018-01-25 RX ADMIN — PROPOFOL 30 MCG/KG/MIN: 10 INJECTION, EMULSION INTRAVENOUS at 18:31

## 2018-01-25 RX ADMIN — POTASSIUM CHLORIDE 10 MEQ: 7.46 INJECTION, SOLUTION INTRAVENOUS at 14:15

## 2018-01-25 RX ADMIN — DILTIAZEM HYDROCHLORIDE 5 MG/HR: 100 INJECTION, POWDER, LYOPHILIZED, FOR SOLUTION INTRAVENOUS at 14:33

## 2018-01-25 RX ADMIN — TAZOBACTAM SODIUM AND PIPERACILLIN SODIUM 3.38 G: 375; 3 INJECTION, SOLUTION INTRAVENOUS at 23:13

## 2018-01-25 RX ADMIN — Medication 10 ML: at 09:00

## 2018-01-25 RX ADMIN — ACYCLOVIR SODIUM 620 MG: 1000 INJECTION, SOLUTION INTRAVENOUS at 12:35

## 2018-01-25 RX ADMIN — WATER 2 G: 1 INJECTION INTRAMUSCULAR; INTRAVENOUS; SUBCUTANEOUS at 12:43

## 2018-01-25 RX ADMIN — PROPOFOL 45 MCG/KG/MIN: 10 INJECTION, EMULSION INTRAVENOUS at 22:17

## 2018-01-25 RX ADMIN — FENTANYL CITRATE 25 MCG: 50 INJECTION, SOLUTION INTRAMUSCULAR; INTRAVENOUS at 23:49

## 2018-01-25 RX ADMIN — METOPROLOL TARTRATE 5 MG: 5 INJECTION, SOLUTION INTRAVENOUS at 12:31

## 2018-01-25 RX ADMIN — FENTANYL CITRATE 25 MCG: 50 INJECTION, SOLUTION INTRAMUSCULAR; INTRAVENOUS at 19:05

## 2018-01-25 RX ADMIN — PANTOPRAZOLE SODIUM 40 MG: 40 INJECTION, POWDER, FOR SOLUTION INTRAVENOUS at 17:47

## 2018-01-25 NOTE — PROGRESS NOTES
Continued Stay Note  [facility]     Patient Name: Domitila Valera  MRN: [id number]  Today's Date: 1/25/2018    Admit Date: 1/19/2018          Discharge Plan       01/25/18 1439    Case Management/Social Work Plan    Additional Comments Spoke with MD who states pt's family requests transfer to another facility for possible procedures not offered here.  CCP spoke with pt's son who requests transfer to possibly [place] or [facility].  Call to [place] transfer line ( [phone]). They have a waiting list.  MD can call daily if still needed. During this time, a rapid was called on patient.  CCP briefly discussed the process of transfer with pt's son.  Call placed to CCP in CCU to notify of transfer and to give report.            Nayana Berg RN

## 2018-01-25 NOTE — PLAN OF CARE
Problem: Patient Care Overview (Adult)  Goal: Plan of Care Review  Outcome: Ongoing (interventions implemented as appropriate)   01/22/18 1557 01/25/18 0433   Coping/Psychosocial Response Interventions   Plan Of Care Reviewed With --  patient   Patient Care Overview   Progress no change --    Outcome Evaluation   Outcome Summary/Follow up Plan --  VSS. HR elevated 90s-140s through the night. sister at bedside. pt responds to touch and gentle shaking. occasionally pt will open eyes spontaneously. PICC red line was not able to flush at beginning of shift. IV team called and both lines now flush with blood return present. 1 unit of PRBC finished on my shift. awaiting am labs and orders. NIH 21. turned q2 hours. will continue to closely monitor.      Goal: Discharge Needs Assessment  Outcome: Ongoing (interventions implemented as appropriate)   01/19/18 2300 01/25/18 0433   Living Environment   Transportation Available --  car;family or friend will provide   Discharge Needs Assessment   Discharge Disposition --  still a patient   Self-Care   Equipment Currently Used at Home none --        Problem: Infection, Risk/Actual (Adult)  Goal: Identify Related Risk Factors and Signs and Symptoms  Outcome: Ongoing (interventions implemented as appropriate)   01/22/18 1947   Infection, Risk/Actual   Infection, Risk/Actual: Related Risk Factors age extremes   Signs and Symptoms (Infection, Risk/Actual) heart rate increase;pain;mental/behavioral changes;weakness     Goal: Infection Prevention/Resolution  Outcome: Ongoing (interventions implemented as appropriate)   01/25/18 0433   Infection, Risk/Actual (Adult)   Infection Prevention/Resolution making progress toward outcome       Problem: Fall Risk (Adult)  Goal: Absence of Falls  Outcome: Ongoing (interventions implemented as appropriate)   01/25/18 0433   Fall Risk (Adult)   Absence of Falls making progress toward outcome       Problem: Pain, Acute (Adult)  Goal: Acceptable Pain  Control/Comfort Level  Outcome: Ongoing (interventions implemented as appropriate)   01/25/18 0433   Pain, Acute (Adult)   Acceptable Pain Control/Comfort Level making progress toward outcome       Problem: Pressure Ulcer Risk (Checo Scale) (Adult,Obstetrics,Pediatric)  Goal: Skin Integrity  Outcome: Ongoing (interventions implemented as appropriate)   01/25/18 0433   Pressure Ulcer Risk (Checo Scale) (Adult,Obstetrics,Pediatric)   Skin Integrity making progress toward outcome       Problem: Skin Integrity Impairment, Risk/Actual (Adult)  Goal: Identify Related Risk Factors and Signs and Symptoms  Outcome: Ongoing (interventions implemented as appropriate)   01/23/18 0441   Skin Integrity Impairment, Risk/Actual   Skin Integrity Impairment, Risk/Actual: Related Risk Factors edema;infection/disease process   Signs and Symptoms (Skin Integrity Impairment) edema     Goal: Skin Integrity/Wound Healing  Outcome: Ongoing (interventions implemented as appropriate)   01/25/18 0433   Skin Integrity Impairment, Risk/Actual (Adult)   Skin Integrity/Wound Healing making progress toward outcome       Problem: Confusion, Acute (Adult)  Goal: Safety  Outcome: Ongoing (interventions implemented as appropriate)

## 2018-01-25 NOTE — PROGRESS NOTES
Continued Stay Note  [facility]     Patient Name: Domitila Valera  MRN: [id number]  Today's Date: 1/25/2018    Admit Date: 1/19/2018          Discharge Plan       01/25/18 1132    Case Management/Social Work Plan    Plan CCP following for rehab vs home with Dulce STROUD.    Additional Comments Tisha/ Rayna STROUD and Marvin Johnson are following.        Nayana Berg RN

## 2018-01-25 NOTE — CONSULTS
Pulmonary Consultation     Patient Name: Domitila Valera  Age/Sex: 62 y.o. female  : 1955  MRN: [id number]    Date of Admission: 2018  Date of Encounter Visit: 18  Encounter Provider: Claudette Villegas MD  Referring Provider: Hugo Elam MD  Place of Service: [facility]  Patient Care Team:  Javier Soriano III, MD as PCP - General (Internal Medicine)      Subjective:     Consulted for: Respiratory distress    Chief Complaint: Tachycardia and respiratory distress  History of Present Illness:  Domitila Valera is a 62 y.o. female that  was transferred to the CCU because of worsening respiratory distress with tachycardia.  She is a very complicated case had a recent admission to [facility] and was discharged on  and was readmitted at [facility] [place] on 2018 with pain in the lower extremity was some rash and possible cellulitis with ankle edema.  She was treated with antibiotic and that did improve however she was been having a progressive worsening encephalopathy and currently she is nonverbal she might respond only to painful stimulation and she has a spontaneous movement and some screams but she does not follow with her eye and she does not follow any commands and she could not recognize her family members.  She is tachycardic but she is maintaining adequate blood pressure at this point and no pressors are needed.  She had fever of unknown origin and she had extensive workup for encephalitis/meningitis that has been so far negative, she had MRI of the head that was suggestive of possible vasculitis versus encephalitis but no obvious evidence to suggest ischemic processes.  Workup from [facility] with reviewed I don't have any ESR, her ANCA panel is still pending, all the cultures are so far negative.  She had empiric treatment for possible fungal sepsis at [facility] because her MRI showed some liver lesion however based on the MRI report disease or felt to be more of a hemangioma  rather than liver abscesses.  Patient has been followed by neurology and infectious disease in addition to the internal medicine team and she has been on empiric treatment for HSV encephalitis, she was started on antibiotic again with Zosyn for aspiration pneumonia, and she has orders for 1 g of Solu-Medrol by neurology that the family are having some hesitation whether to give it or not given its immunosuppressant effect if there is any potential infection involved.      Pulmonary Functions Testing Results:    No results found for: FEV1, FVC, FSA6TTO, TLC, DLCO    Review of Systems:   Review of Systems   Unobtainable from the patient at this point otherwise.  Refer to admission note  Past Medical History:  Past Medical History:   Diagnosis Date   • Hypertension    • Ischemic colitis    • Rheumatoid arthritis    • Thyroid nodule        Past Surgical History:   Procedure Laterality Date   • ANKLE SURGERY Right    •  SECTION     • HYSTERECTOMY     • JOINT REPLACEMENT Bilateral     knee       Home Medications:   Prescriptions Prior to Admission   Medication Sig Dispense Refill Last Dose   • amLODIPine-valsartan (EXFORGE) 5-320 MG per tablet Take 1 tablet by mouth Daily.   2018 at Unknown time   • atenolol-chlorthalidone (TENORETIC) 50-25 MG per tablet Take 1 tablet by mouth Daily.   2018 at Unknown time   • cycloSPORINE (RESTASIS) 0.05 % ophthalmic emulsion Administer 1 drop to both eyes 2 (Two) Times a Day.      • estradiol (ESTRACE) 1 MG tablet Take 1 mg by mouth Daily.   2018 at Unknown time   • ferrous gluconate 324 (37.5 Fe) MG tablet tablet Take 324 mg by mouth 2 (Two) Times a Day With Meals.   2018 at Unknown time   • fluconazole (DIFLUCAN) 200 MG tablet Take 400 mg by mouth Daily.   2018 at Unknown time   • Folic Acid-Vit B6-Vit B12 (B COMPLEX-FOLIC ACID PO) Take 1 capsule by mouth Daily.   2018 at Unknown time   • gabapentin (NEURONTIN) 100 MG capsule Take 200 mg by mouth  3 (Three) Times a Day.   1/18/2018 at Unknown time   • HYDROcodone-acetaminophen (NORCO) 5-325 MG per tablet Take 1.5 tablets by mouth Every 6 (Six) Hours As Needed for Moderate Pain .   1/19/2018 at 1520   • temazepam (RESTORIL) 15 MG capsule Take 15 mg by mouth At Night As Needed for Sleep.   1/18/2018 at Unknown time   • vitamin D (ERGOCALCIFEROL) 61752 units capsule capsule Take 50,000 Units by mouth 1 (One) Time Per Week.      • predniSONE (DELTASONE) 10 MG tablet Take 5 mg by mouth Daily.      • Tofacitinib Citrate (XELJANZ) 5 MG tablet Take 1 tablet by mouth 2 (Two) Times a Day.          Inpatient Medications:  Scheduled Meds:  amLODIPine 5 mg Oral Q24H   cycloSPORINE 1 drop Both Eyes BID   estradiol 1 mg Oral Daily   Ferrex 150 forte plus 1 capsule Oral Daily With Breakfast   ferrous sulfate 325 mg Oral BID With Meals   gabapentin 200 mg Oral Q12H   methylPREDNISolone sodium succinate 1,000 mg Intravenous Q24H   pantoprazole 40 mg Intravenous BID AC   piperacillin-tazobactam 3.375 g Intravenous Once   piperacillin-tazobactam 3.375 g Intravenous Q8H   potassium chloride 40 mEq Oral Once   sodium chloride 10 mL Intracatheter Q12H     Continuous Infusions:  diltiaZEM 5-15 mg/hr Last Rate: 5 mg/hr (01/25/18 1433)   hold 1 each    sodium chloride 75 mL/hr Last Rate: 75 mL/hr (01/24/18 2230)     PRN Meds:.•  acetaminophen  •  acetaminophen **OR** acetaminophen  •  acetaminophen  •  dextrose  •  hold  •  hydrALAZINE  •  HYDROcodone-acetaminophen  •  metoprolol tartrate  •  ondansetron  •  potassium chloride **OR** potassium chloride **OR** potassium chloride  •  sodium chloride  •  sodium chloride  •  temazepam    Allergies:  Allergies   Allergen Reactions   • Buffered Aspirin Rash and Shortness Of Breath   • Ace Inhibitors      cough   • Aurothioglucose Diarrhea     Increased AST & ALT   • Latex Itching   • Keflex [Cephalexin] Hives and Rash     Tolerated cephalosporins 1/2018       Past Social History:  Social  History     Social History   • Marital status:      Spouse name: N/A   • Number of children: N/A   • Years of education: N/A     Social History Main Topics   • Smoking status: Never Smoker   • Smokeless tobacco: None   • Alcohol use No   • Drug use: Defer   • Sexual activity: Defer     Other Topics Concern   • None     Social History Narrative   • None       Past Family History:  History reviewed. No pertinent family history.        Objective:   Temp:  [97.9 °F (36.6 °C)-99.8 °F (37.7 °C)] 97.9 °F (36.6 °C)  Heart Rate:  [] 136  Resp:  [16-40] 40  BP: (134-171)/() 147/91  SpO2:  [84 %-100 %] 99 %  on    O2 Device: room air     Intake/Output Summary (Last 24 hours) at 01/25/18 1523  Last data filed at 01/24/18 2155   Gross per 24 hour   Intake              300 ml   Output                0 ml   Net              300 ml     Body mass index is 31.03 kg/(m^2).  Last 3 weights    01/23/18  0537 01/24/18  0500 01/25/18  0500   Weight: 65 kg (143 lb 4.8 oz) 67.6 kg (149 lb) 69.7 kg (153 lb 11.2 oz)     Weight change: 2.132 kg (4 lb 11.2 oz)    Physical Exam:   Physical Exam   General:    Restless, purposeless movement, moving all extremities, nonverbal except for some episodic screams, does not follow any commands, did not recognize family at the bedside                    Head:    Normocephalic, atraumatic.   Eyes:          Conjunctivae and sclerae normal, no icterus, PERRLA   Throat:   No oral lesions, no thrush, oral mucosa moist. She does have blisters on the lips  Mainly on the anteromedial aspect on the right   Neck:   Supple, trachea midline.   Lungs:     Coarse breath sounds, positive crackles posteriorly, positive respiratory distress with tachypnea.      Heart:   irregular and tachycardic with a pattern on the monitor suggestive of atrial flutter no murmurs, gallops, or rubs noted.   Abdomen:     Soft, non-tender, non-distended, positive bowel sounds.    Extremities:   No clubbing, Trace  edema.      Pulses:   Pulses palpable and equal bilaterally.    Skin:   No bleeding or rash.   Neuro:   Restless, moving all extremity, purposeless movement, severely encephalopathic.    unable to assess.      Lab Review:     Results from last 7 days  Lab Units 01/25/18  0551 01/24/18  0249 01/23/18  0542 01/22/18  0455 01/21/18  0541 01/20/18  0445 01/19/18  1457   SODIUM mmol/L 145 143 137 134* 135* 135* 127*   POTASSIUM mmol/L 3.1* 3.2* 3.4* 3.7 4.1 4.7 6.0*   CHLORIDE mmol/L 115* 113* 105 98 97* 101 92*   CO2 mmol/L 19.6* 18.3* 20.6* 21.7* 25.4 21.6* 22.8   BUN mg/dL 30* 28* 26* 32* 34* 48* 59*   CREATININE mg/dL 1.09* 1.04* 1.04* 1.13* 1.14* 1.24* 1.70*   GLUCOSE mg/dL 103* 80 73 78 80 69 92   CALCIUM mg/dL 7.3* 6.6* 6.8* 7.7* 7.7* 8.0* 9.0   AST (SGOT) U/L 55*  --  40* 56* 57*  --  78*   ALT (SGPT) U/L 14  --  14 19 20  --  26   ALBUMIN g/dL 1.70*  --  1.40* 1.90* 1.90* 2.00* 2.60*   Results for CATRACHO ABBOTT (MRN [id number]) as of 1/25/2018 15:34   Ref. Range 1/21/2018 05:41 1/22/2018 04:55 1/22/2018 16:33   Rheumatoid Factor Quantitative Latest Ref Range: 0.0 - 14.0 IU/mL  42.6 (H)    RPR Latest Ref Range: Non-Reactive  Non-Reactive     HIV-1/ HIV-2 Latest Ref Range: Non-Reactive  Non-Reactive     RNP Antibodies Latest Ref Range: 0.0 - 0.9 AI   >8.0 (H)   Tamez Antibodies Latest Ref Range: 0.0 - 0.9 AI   >8.0 (H)   Results for CATRACHO ABBOTT (MRN [id number]) as of 1/25/2018 15:34   Ref. Range 1/21/2018 14:53 1/25/2018 10:46   Appearance, CSF Latest Ref Range: Clear  Slightly Hazy (A) Cloudy (A)   Color, CSF Latest Ref Range: Colorless  Pink (A) Red (A)   Glucose, CSF Latest Ref Range: 40 - 70 mg/dL 45 79 (H)   Monocytes, CSF Latest Units: % 5 5   Mononuclear, CSF Latest Units: % 7    Neutrophils, CSF Latest Units: % 2 44   nRBC, CSF Latest Units: /100 WBCs  1   Nucleated Cells, CSF Latest Ref Range: 0 - 5 /mm3 11 (H) 123 (H)   Other Cells, CSF Latest Units: /100 WBCs 1    Protein, Total (CSF) Latest Ref Range: 15.0 -  45.0 mg/dL 48.0 (H) 463.0 (H)   RBC, CSF Latest Ref Range: 0 - 0 /mm3 1640 (H) 410825 (H)   Supernatant Color, CSF Latest Ref Range: Colorless   Red (A)   Tube Number, CSF Unknown 1 2   Results for CATRACHO ABBOTT (MRN [id number]) as of 1/25/2018 15:34   Ref. Range 1/20/2018 04:45   C-Reactive Protein Latest Ref Range: 0.00 - 0.50 mg/dL 10.72 (H)      BONE MARROW, ASPIRATE AND BIOPSY:  MILDLY HYPERCELLULAR BONE MARROW (60-70%) WITH TRILINEAGE HEMATOPOIESIS       AND MILD RETICULIN FIBROSIS (SEE COMMENT).  ADEQUATE IRON STORES.   CYTOGENETIC STUDIES ARE PENDING.     IMMUNOPHENOTYPING FAILS TO REVEAL A MONOCLONAL B CELL POPULATION, AN ABNORMAL  T-CELL PHENOTYPE, AN INCREASE IN NATURAL KILLER CELLS, A PLASMA CELL POPULATION  OR A BLAST POPULATION.    Results from last 7 days  Lab Units 01/25/18  0551   CK TOTAL U/L 152       Results from last 7 days  Lab Units 01/25/18  0551 01/24/18  0249 01/23/18  0542 01/22/18  0455 01/21/18  1937 01/21/18  0541 01/20/18  1725 01/20/18  0602 01/19/18  1457   WBC 10*3/mm3 3.31* 2.75* 3.39* 3.85*  --  5.23  --  4.88 6.07   HEMOGLOBIN g/dL 9.1* 7.3* 7.4* 8.0* 8.0* 6.6* 7.1* 7.0* 8.1*   HEMATOCRIT % 29.3* 24.3* 24.2* 26.1* 25.4* 21.9* 24.0* 22.9* 26.9*   PLATELETS 10*3/mm3 94* 69* 98* 120*  --  121*  --  126* 150   MCV fL 75.7* 72.5* 71.4* 72.1*  --  69.1*  --  69.2* 69.9*   MCH pg 23.5* 21.8* 21.8* 22.1*  --  20.8*  --  21.1* 21.0*   MCHC g/dL 31.1* 30.0* 30.6* 30.7*  --  30.1*  --  30.6* 30.1*   RDW % 23.4* 22.2* 21.4* 20.8*  --  19.6*  --  19.9* 19.9*   RDW-SD fl 63.1* 57.8* 54.6* 53.6  --  47.8  --  49.3 49.6   NEUTROPHIL % %  --   --   --   --   --   --   --  50.5  --    LYMPHOCYTE % %  --   --   --   --   --   --   --  42.4  --    MONOCYTES % %  --   --   --   --   --   --   --  5.7  --    EOSINOPHIL % %  --   --   --   --   --   --   --  0.4  --    BASOPHIL % %  --   --   --   --   --   --   --  0.2  --    IMM GRAN % %  --   --   --   --   --   --   --  0.8*  --    NEUTROS ABS  10*3/mm3  --   --   --   --   --   --   --  2.46 4.37   LYMPHS ABS 10*3/mm3  --   --   --   --   --   --   --  2.07  --    MONOS ABS 10*3/mm3  --   --   --   --   --   --   --  0.28  --    EOS ABS 10*3/mm3  --   --   --   --   --   --   --  0.02  --    BASOS ABS 10*3/mm3  --   --   --   --   --   --   --  0.01  --    IMMATURE GRANS (ABS) 10*3/mm3  --   --   --   --   --   --   --  0.04*  --            Results from last 7 days  Lab Units 01/25/18  0551 01/21/18  1937   MAGNESIUM mg/dL 1.9 1.9       Results from last 7 days  Lab Units 01/24/18  0249   CHOLESTEROL mg/dL 102   TRIGLYCERIDES mg/dL 347*   HDL CHOL mg/dL 9*   LDL CHOL mg/dL 24           Results from last 7 days  Lab Units 01/24/18  0249   TSH mIU/mL 0.295       Results from last 7 days  Lab Units 01/25/18  1405   PH, ARTERIAL pH units 7.431   PCO2, ARTERIAL mm Hg 28.7*   PO2 ART mm Hg 81.9   HCO3 ART mmol/L 19.1*       Results from last 7 days  Lab Units 01/22/18  0455 01/20/18  0445 01/20/18  0029 01/19/18  1934   PROCALCITONIN ng/mL 0.94* 1.01*  --   --    LACTATE mmol/L  --   --  1.7 2.1*       Results from last 7 days  Lab Units 01/20/18  0445   CRP mg/dL 10.72*           Results from last 7 days  Lab Units 01/21/18  2147 01/21/18  1453 01/19/18  2202 01/19/18  1934   BLOODCX   --   --  No growth at 5 days No growth at 5 days   RESPCX  Light growth (2+) Normal Respiratory Mariella  --   --   --    GRAM STAIN RESULT  Few (2+) WBCs seen  Few (2+) Epithelial cells per low power field  Mixed bacterial morphotypes seen on Gram Stain No WBCs or organisms seen  --   --        Results from last 7 days  Lab Units 01/20/18  0219   NITRITE UA  Negative       Results from last 7 days  Lab Units 01/20/18  1127   ADENOVIRUS DETECTION BY PCR  Not Detected   CORONAVIRUS 229E  Not Detected   CORONAVIRUS HKU1  Not Detected   CORONAVIRUS NL63  Not Detected   CORONAVIRUS OC43  Not Detected   HUMAN METAPNEUMOVIRUS  Not Detected   HUMAN RHINOVIRUS/ENTEROVIRUS  Not Detected    INFLUENZA B PCR  Not Detected   PARAINFLUENZA 1  Not Detected   PARAINFLUENZA VIRUS 2  Not Detected   PARAINFLUENZA VIRUS 3  Not Detected   PARAINFLUENZA VIRUS 4  Not Detected   BORDETELLA PERTUSSIS PCR  Not Detected   CHLAMYDOPHILA PNEUMONIAE PCR  Not Detected   MYCOPLAMA PNEUMO PCR  Not Detected   INFLUENZA A PCR  Not Detected   INFLUENZA A H3  Not Detected   INFLUENZA A H1  Not Detected   RSV, PCR  Not Detected       Results from last 7 days  Lab Units 01/20/18  0220 01/20/18  0219   SODIUM UR mmol/L 75  --    CREATININE UR mg/dL  --  40.1         Imaging:  Imaging Results (most recent)     Procedure Component Value Units Date/Time    CT Abdomen Pelvis With Contrast [[id number]] Collected:  01/20/18 1649     Updated:  01/20/18 1837    Narrative:       CT CHEST ABDOMEN AND PELVIS WITH CONTRAST     HISTORY: Acute renal failure, hyponatremia, malaise, weakness.     TECHNIQUE: CT chest, abdomen and pelvis with IV and oral contrast.     COMPARISON: There are no previous CTs for comparison.     FINDINGS  CHEST: There are small mediastinal nodes without evidence for  mediastinal or hilar ramya enlargement. Shotty bilateral axillary nodes  are present. There is a 1.1 cm right axillary node. There is also a  mildly enlarged left subpectoral node measuring 1.5 cm.     Small pleural effusions layer dependently and there is left and right  lower lobe atelectasis. Within the lingula and anterior/inferior lateral  left upper lobe there is peripheral airspace disease and ground glass  opacity that is consistent with infiltrate in the proper clinical  setting. Right lung appears clear with the exception of right basilar  atelectasis. Heart size is enlarged. There is advanced bilateral  shoulder osteoarthritis. Mild scoliotic curvature is present of the  thoracic spine.     ABDOMEN/PELVIS:  There is diffuse fat infiltration of the liver. Dense  material layers dependently within the gallbladder fundus due to sludge  or small  stones or vicarious excretion of contrast. Splenic size is  within normal limits. Adrenal glands, pancreas, and right kidney appear  within normal limits. There is a 9 mm left mid to lower pole low-density  renal lesion that is difficult to characterize due to its small size,  though it is most likely a cyst. There are multiple retroperitoneal  lymph nodes with borderline enlargement. A left periaortic node measures  1.2 cm. There is no bowel dilatation or evidence for bowel obstruction.  There is no evidence for appendicitis. Oral contrast extends to the  rectum.  There is no bowel dilatation or evidence for obstruction. There  are bilateral inguinal nodes which are borderline enlarged. A right  inguinal node measures 1.6 x 0.9 cm.       Impression:       1. Small bilateral pleural effusions with lower lobe atelectasis. Within  the lingula and anterior inferior lateral left upper lobe there is  peripheral area of consolidation and ground glass opacity consistent  with infiltrate in the proper clinical setting.  2. Cardiomegaly.  3. Borderline enlarged nodes within the right axilla, left subpectoral  region, retroperitoneum, right inguinal region. These may represent  reactive nodes though are indeterminate with this exam. There are no  previous studies for comparison.  4. Dense material within the gallbladder fundus due to sludge, vicarious  excretion of contrast, or small stones.  5. Sigmoid diverticulosis without evidence for diverticulitis.  6. Advanced bilateral shoulder osteoarthritis.      Radiation dose reduction techniques were utilized, including automated  exposure control and exposure modulation based on body size.     This report was finalized on 1/20/2018 6:34 PM by Dr. Blake Graham MD.       CT Chest With Contrast [[id number]] Collected:  01/20/18 1649     Updated:  01/20/18 1837    Narrative:       CT CHEST ABDOMEN AND PELVIS WITH CONTRAST     HISTORY: Acute renal failure, hyponatremia, malaise,  weakness.     TECHNIQUE: CT chest, abdomen and pelvis with IV and oral contrast.     COMPARISON: There are no previous CTs for comparison.     FINDINGS  CHEST: There are small mediastinal nodes without evidence for  mediastinal or hilar ramya enlargement. Shotty bilateral axillary nodes  are present. There is a 1.1 cm right axillary node. There is also a  mildly enlarged left subpectoral node measuring 1.5 cm.     Small pleural effusions layer dependently and there is left and right  lower lobe atelectasis. Within the lingula and anterior/inferior lateral  left upper lobe there is peripheral airspace disease and ground glass  opacity that is consistent with infiltrate in the proper clinical  setting. Right lung appears clear with the exception of right basilar  atelectasis. Heart size is enlarged. There is advanced bilateral  shoulder osteoarthritis. Mild scoliotic curvature is present of the  thoracic spine.     ABDOMEN/PELVIS:  There is diffuse fat infiltration of the liver. Dense  material layers dependently within the gallbladder fundus due to sludge  or small stones or vicarious excretion of contrast. Splenic size is  within normal limits. Adrenal glands, pancreas, and right kidney appear  within normal limits. There is a 9 mm left mid to lower pole low-density  renal lesion that is difficult to characterize due to its small size,  though it is most likely a cyst. There are multiple retroperitoneal  lymph nodes with borderline enlargement. A left periaortic node measures  1.2 cm. There is no bowel dilatation or evidence for bowel obstruction.  There is no evidence for appendicitis. Oral contrast extends to the  rectum.  There is no bowel dilatation or evidence for obstruction. There  are bilateral inguinal nodes which are borderline enlarged. A right  inguinal node measures 1.6 x 0.9 cm.       Impression:       1. Small bilateral pleural effusions with lower lobe atelectasis. Within  the lingula and anterior  inferior lateral left upper lobe there is  peripheral area of consolidation and ground glass opacity consistent  with infiltrate in the proper clinical setting.  2. Cardiomegaly.  3. Borderline enlarged nodes within the right axilla, left subpectoral  region, retroperitoneum, right inguinal region. These may represent  reactive nodes though are indeterminate with this exam. There are no  previous studies for comparison.  4. Dense material within the gallbladder fundus due to sludge, vicarious  excretion of contrast, or small stones.  5. Sigmoid diverticulosis without evidence for diverticulitis.  6. Advanced bilateral shoulder osteoarthritis.      Radiation dose reduction techniques were utilized, including automated  exposure control and exposure modulation based on body size.     This report was finalized on 1/20/2018 6:34 PM by Dr. Blake Graham MD.       MRI Brain With & Without Contrast [[id number]] Collected:  01/22/18 1942     Updated:  01/22/18 2055    Narrative:       MR SCAN OF THE BRAIN WITHOUT AND WITH INTRAVENOUS CONTRAST     HISTORY: Confusion. Generalized weakness.     TECHNIQUE: The MR scan was performed with sagittal, axial, and coronal  images and includes T1 images without and with intravenous contrast.      FINDINGS: There is mild diffuse atrophy and some minimal chronic small  vessel ischemic change. The diffusion sequence demonstrates a tiny focus  of increased signal in the right vertex in the posterior right frontal  region measuring 2 or 3 mm. This is suspicious for a tiny acute infarct.  There is no associated hemorrhage or abnormal enhancement or mass  effect. The remainder of the MR scan is unremarkable except for some  mild mucosal thickening scattered in the ethmoid air cells.     CONCLUSION: Mild diffuse atrophy and chronic small vessel ischemic  change. Probable tiny acute infarct in the right vertex in the posterior  right frontal region measuring 2 or 3 mm as seen on the  diffusion  sequence.     This report was finalized on 1/22/2018 8:52 PM by Dr. Willie Newton MD.       CT Angiogram Neck With & Without Contrast [[id number]] Collected:  01/24/18 0818     Updated:  01/24/18 1108    Narrative:       CONTRAST-ENHANCED CT ANGIOGRAM OF THE HEAD AND NECK -  01/24/2018     HISTORY: Subarachnoid hemorrhage, evaluate for cerebral aneurysm and  vasospasm.     TECHNIQUE: Spiral CT images were obtained from the base of the skull to  the vertex both pre and post intravenous contrast, and images were  reformatted and submitted in 3 mm thick axial CT section with brain  algorithm, and additional spiral CT images were obtained from the top of  the aortic arch up through the great vessels in the head and neck during  the arterial phase of contrast, and images were reformatted and  submitted in 1 mm thick axial CT section, 1 mm thick sagittal and  coronal reconstructions and 3-D reconstructions were performed to  complete the CT angiogram of the head and neck.     There are no prior CTs of the head for comparison. This is correlated to  prior MRI of the brain on 01/22/2018 as well as MRA of the head on last  evening 01/23/2018 at 9:50 PM.     FINDINGS: There is a questionable very subtle 2 cm area of low-density  in the inferior lateral right frontal cortex and juxtacortical white  matter.  This is an equivocal finding seen on precontrast axial CT  images 30 through 33.  The remainder of the brain parenchyma is normal  in attenuation. The ventricles are normal in size. I see no mass effect  and no midline shift and no extra-axial fluid collections are  identified. There is no evidence of acute intracranial hemorrhage. No  abnormal areas of enhancement are seen in the head.  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 The oral  pharynx, hypopharynx, true cords and subglottic airway are normal  in  appearance; there is heterogeneous enhancement in the thyroid gland. The  lung apices are clear, there is small bilateral posterior layering  effusions and there is discoid area of low density projecting over the  superior aspect of the left major fissure that could be loculated fluid  in left major fissure. The parotid, , parapharyngeal,  submandibular spaces are symmetric and are normal in appearance. There  is an abnormal appearance to the C1-2 level with marked widening of the  atlantodental interval measuring 7 mm, and there appears to be bony  bridging from the posterior aspect of the anterior ring of C1 and the  anterior cortex of the odontoid and also some bony fusion across the  lateral masses of C1 and C2 bilaterally, and I suspect that this is  related to old trauma and as a result there is moderate narrowing of the  anterior posterior diameter of the canal at the C1 ring level. There is  disc space narrowing, degenerative endplate changes at C5-6, posterior  endplate spurring, mild canal narrowing and left uncovertebral joint  hypertrophy, moderate left bony foraminal narrowing at C5-6. There are  prominent osteoarthritic changes in the left glenohumeral joint with  joint space narrowing, moderate marginal osteophytic spurring and there  is extensive fatty marrow replacement in the visualized proximal left  humerus and a coarse area of calcification in the proximal left humeral  metaphysis and humeral neck in the medullary cavity that measures 17 mm  in size, could be calcification, old bone infarct or an enchondroma.     CT angiogram images demonstrate common origin of left common carotid  artery and brachiocephalic artery off the aortic arch constituting a  bovine configuration to the aortic arch which is a normal anatomic  variation. The left subclavian artery origin is normal in appearance, no  stenosis is seen in left subclavian artery. The left vertebral artery  origin is normal in  appearance, no stenosis is seen in the left  vertebral artery.  The left common carotid origin is normal in  appearance.  No stenosis is seen in the left common carotid artery, its  bifurcation on the left,  There is coarsely calcified atherosclerotic  plaque involving the inferior wall of the left common carotid  bifurcation, but there is no stenosis in the left common carotid  bifurcation.  There is no stenosis in the left internal carotid artery  using the NASCET criteria. Brachycephalic artery origin is normal in  appearance. No stenosis is seen in the brachycephalic artery, its  bifurcation into the right subclavian and common carotid artery is  normal in appearance, no stenosis is seen in the right subclavian  artery. The right vertebral artery origin is normal in appearance, no  stenosis is seen in the right vertebral artery from its origin to the  vertebrobasilar junction. The right common carotid origin is normal in  appearance, no stenosis is seen in the right common carotid artery, its  bifurcation into the right internal and external carotid arteries is  within normal limits and no stenosis is seen in the right internal  carotid artery using the NASCET criteria.     CT angiogram images through the head demonstrate a normal appearance to  the visualized intracranial segments of the distal vertebral arteries  and normal appearance to the basilar artery and the basilar tip as well  as the posterior cerebral and superior cerebellar arteries bilaterally.  The upper cervical, petrous, cavernous and supracavernous segments of  the internal carotid arteries are within normal limits. There is an  atretic A1 segment of the right anterior cerebral artery which is a  normal anatomic variation.  The dominant left A1 segment plaque supplies  both A2 segments via a normal-appearing anterior communicating artery.  The M1 segments and middle cerebral arteries and the middle cerebral  artery trifurcations are within  normal limits.       Impression:       1. The findings in the cervical spine are similar to prior cervical  spine MRI from [facility] on 07/12/2013. There is  marked widening of the atlantodental interval up to 7 mm with bony  bridging between the posterior cortex in the anterior ring C1 and the  anterior aspect of the odontoid, and there is some bony bridging and  fusion across the right left lateral masses of C1 and C2 and due to the  anterior translation of C1 on C2 there is moderate narrowing of the  anterior posterior diameter of the canal at the C1 ring level. This is  likely an old posttraumatic deformity and correlate with clinical  history.  Cervical spondylosis is present with posterior spurring and  uncovertebral joint hypertrophy contributing to mild canal and moderate  left foraminal narrowing at C5-6 which could affect the exiting left C6  nerve root  2. There is questionable very subtle faint 2 cm area of low-density in  the inferior lateral right frontal cortex corresponding to an area of  FLAIR hyperintensity on the MRA of the head 5 1/2 hours ago, last night,  01/23/2018 at 9:51 PM. The FLAIR images on the MRA of the head also  demonstrated FLAIR high signal in the right frontal sulci and lateral  left temporal occipital sulci. The etiology of the FLAIR hyperintensity  on the MRA 5 1/2 hours ago is uncertain, and I recommend an MRI of the  brain with and without contrast to further evaluate and it can be  correlated to recent MRI of the head 01/22/2018.  I also recommend  correlation with recent lumbar puncture results.  3. There are small bilateral posterior layering effusions and some  loculated fluid in the superior aspect of the left major fissure, right  subclavian line is in place and its tip is not evaluated on this exam.  4. CT angiogram of the head and neck is otherwise within normal limits  with no stenosis seen in the great vessels in the head or neck and  no  intracranial aneurysm identified.     The results of this and recommendations from this study were  communicated to Dr. Endy Welch by telephone on 01/24/2018 at 7:30 AM.     Radiation dose reduction techniques were utilized, including automated  exposure control and exposure modulation based on body size.     This report was finalized on 1/24/2018 11:05 AM by Dr. Javier Rodrigues MD.       MRI Lumbar Spine With & Without Contrast [[id number]] Collected:  01/24/18 0827     Updated:  01/24/18 1408    Narrative:       MRI LUMBAR SPINE WITH AND WITHOUT CONTRAST     HISTORY:  Back pain. Confusion.     COMPARISON: No prior MRI of the lumbar spine is available for  comparison.     FINDINGS:     The study is hampered by patient motion. The alignment of the lumbar  spine is within normal limits. There is mild loss of disc height at  L4-L5 and L5-S1. The conus is at L1.     There is increased signal intensity involving the posterior paraspinal  musculature symmetrical extending from L1 to S1, nonspecific. There is a  small amount fluid present within the facet joints at L3-L4 and L4-L5  bilaterally.     There is heterogeneous appearance of the marrow diffusely with decreased  signal intensity involving the anterior aspect of T1 sequences without  any incident. This extends to and involves the upper thoracic spine. The  appearance of the upper thoracic spine is similar to the MRI examination  of the cervical spine performed on 07/12/2013.      L1-L2: Mild facet degenerative disease is present bilaterally.     L2-L3: There is no osseous bulge or herniation.     L3-L4: Mild facet degenerative disease is present bilaterally. There is  no evidence of disc bulge or herniation.     L4-L5:  Moderate facet degenerative disease is present bilaterally.  There is a broad-based disc osteophyte complex resulting in mild  flattening of ventral surface of the thecal sac. There is mild neural  foraminal compromise bilaterally secondary to  extension of a small disc  osteophyte complex into the neural foramen.     L5-S1: Moderate facet degenerative disease is present bilaterally. There  is a broad-based disc osteophyte complex resulting in mild flattening of  the ventral surface of the thecal sac, slightly more prominent to the  right.     The sagittal T1 postcontrast sequence demonstrates enhancement of the  cauda equina and a mildly increased signal intensity involving the CSF  within the inferior aspect of the thecal sac. There is also an epidural  fluid collection posterior to L5-S1. This measures approximately 11 mm  in AP dimension and 2 mm in AP dimension and is located at the midline.  There is enhancement of the posterior paraspinal musculature from L1-L2  to S1.     The axial T2 sequence demonstrates abnormal signal adjacent to the  kidneys bilaterally extending inferiorly, only partially visualized  suggesting perirenal fluid, not present on the CT examination of  01/20/2018.       Impression:       1.  The study is grossly abnormal with enhancement of cauda equina,  intermediate signal within the thecal sac and dural enhancement,  nonspecific. The intermediate signal may be secondary to blood products  although infection cannot be excluded. There is a thin plane of fluid  posterior to the thecal sac at L5-S1. The appearance is nonspecific.  This may be secondary to a small seroma or posttraumatic fluid  collection although infection or abscess cannot be excluded. There is  edema and enhancement involving the paraspinal musculature bilaterally.  There is no evidence of edema or enhancement involving the vertebral  bodies or disc spaces. Fluid is present within the facet joints at L3-L4  and L4-L5 bilaterally. This may be degenerative in nature although  infected facets cannot be excluded.  2.  Abnormal signal in the perirenal space bilaterally new versus the CT  examination 01/20/2018. This is only partially visualized but may be  secondary  to third spacing.     The above information was called to and discussed with Dr. Welch.     This report was finalized on 1/24/2018 2:04 PM by Dr. Deandre Van MD.       MRI Angiogram Head Without Contrast [[id number]] Collected:  01/24/18 0013     Updated:  01/24/18 2353    Narrative:       MRI ANGIOGRAM HEAD - WITHOUT GADOLINIUM INTRAVENOUS CONTRAST.     TECHNIQUE: Routine brain MRA was performed. 3-D time-of-flight technique  was used without intravenous contrast. Source and MIP images were  reviewed.      HISTORY: Neurological change, weakness.     COMPARISON: 01/22/2018.     FINDINGS:     Abnormal gyral high signal is seen on the diffusion sequence is in the  right frontal lobe, anterior right temporal lobe and left temporal lobe.  Findings of small vessel ischemic disease, a few old lacunar infarcts  and cortical atrophy.     Bilateral internal carotid arteries are symmetric and patent. Their  bifurcations are symmetric and patent.      Anterior cerebral arteries are patent. The middle cerebral arteries are  patent.      Distal vertebral arteries are patent. The posterior inferior cerebellar  arteries (PICAs) are symmetric and patent.      The basilar artery is continuously patent. The superior cerebellar  arteries are symmetric and patent.      The P1 segments are patent. The posterior cerebral arteries (PCAs) are  patent. The posterior communicating arteries are symmetric and patent.      1 cm on Tornwaldt cyst is suspected, further evaluation is recommended.       Impression:       Moderate amount of subarachnoid hemorrhage layering the right frontal  lobe and both temporal lobes. Differential diagnoses includes  encephalitis.  1 cm on Tornwaldt cyst is suspected, further evaluation with CT scan or  MRI of the head and neck may be performed.     Findings called to patient's nurse Ms. Cortez, 12:13 AM.     This report was finalized on 1/24/2018 11:50 PM by Dr. Mayelin Carter MD.       MRI Thoracic Spine  With & Without Contrast [[id number]] Collected:  01/24/18 0047     Updated:  01/24/18 2353    Narrative:       MRI OF THE THORACIC SPINE WITHOUT AND WITH CONTRAST.     TECHNIQUE: Multisequence multiplanar MRI images of the thoracic spine.     HISTORY: Mid back thoracic pain.     COMPARISON: No prior studies for comparison.     FINDINGS:  Vertebral body height and signal is normal, no acute fracture.   Intervertebral discs demonstrates mild desiccation changes in the  midthoracic spine. Small disc osteophyte is seen at T3-4 causing mild  spinal canal stenosis.     Thoracic spinal cord demonstrates normal caliber, no atrophy or  swelling.     Small left pleural effusion is incidentally noted.       Impression:       Mild spondylosis of the thoracic spine, small disc osteophyte complex at  T3-4. No fracture or dislocation.  Small left pleural effusion.     This report was finalized on 1/24/2018 11:50 PM by Dr. Mayelin Carter MD.       MRI Brain With & Without Contrast [[id number]] Collected:  01/24/18 1058     Updated:  01/25/18 0653    Narrative:       MRI EXAMINATION OF THE BRAIN WITH AND WITHOUT CONTRAST     HISTORY: Confusion, altered mental status.     COMPARISON: MRI of the brain 01/22/2018, MRA 01/23/2018 and CT angiogram  01/24/2018.     FINDINGS: There is no evidence of restricted diffusion to suggest acute  infarction.     The axial T2 FLAIR sequence demonstrates increased signal intensity  involving the sulci of the right frontal lobe laterally as well as  extending superiorly to the vertex, more prominent as compared to the T2  FLAIR examination performed on 01/22/2018. There is also increased  signal intensity involving the cortex of the right frontal lobe  laterally evident on the T2 and T2 FLAIR sequences. A similar process is  appreciated involving the left temporal lobe laterally, smaller in area.  There is increased signal intensity involving the sulci involving the  parietal lobes bilaterally, present  previously.     There is increased signal intensity appreciated involving the basal  ganglion on the right inferiorly on the T2 FLAIR sequence. This area  measures approximately 9 mm in transverse dimension and is more  prominent as compared to the prior examination. There are small foci of  increased signal intensity appreciated involving the white matter of the  cerebellar hemispheres bilaterally on the T2 FLAIR sequence which are  slightly more prominent as compared to prior examination.     The axial T2 FLAIR sequence demonstrates increased signal intensity  involving the sulci overlying the left frontal lobe laterally which is  new versus the MRI examination of 01/23/2018.     There is mild prominence of the lateral ventricles, similar in  appearance compared to prior examination.     The axial T1 precontrast sequence demonstrates a mild degree of  increased signal intensity involving the right frontal lobe laterally.  This may be related to the contrast which was administered on  01/22/2018. Petechial hemorrhage could also be considered but is less  likely. There is no evidence of signal loss on the susceptibility  weighted imaging at this location.     After contrast administration there was increased signal intensity  involving the cortex as well as sulci of the right frontal lobe  laterally and involving the left temporal lobe laterally to a lesser  extent. There is faint enhancement involving sulci in the left temporal  region laterally. There is enhancement involving the inferior aspect of  the basal ganglia on the right (approximately 6 mm) as well as  enhancement involving the left cerebellar hemisphere centrally (4-5 mm  in size). This cerebellar enhancement was not present previously. The  enhancement involving the basal ganglia on the right inferiorly was not  present previously.       Impression:       Complex and unusual presentation with multifocal areas of  sulcal T2 FLAIR hyperintensity as well  as areas of T2 and T2 FLAIR  hyperintensity involving the right frontal lobe laterally, left temporal  lobe laterally, basal ganglia inferiorly and left cerebellar hemisphere  centrally. These areas are more prominent as compared to the prior  examination and there is enhancement involving the left cerebral  hemisphere, basal ganglia on the right inferiorly and sulcal  hyperintensity evident on the postcontrast sequence as well. The  multivascular distribution suggests a multifocal process with breakdown  of the blood-brain barrier. This may be secondary to an inflammatory  infectious process such as meningitis or encephalitis. However no  convincing areas of T2 FLAIR hyperintensity are noted within the  dependent portions of the ventricular system or involving the basilar  cisterns. A vasculitis could also be considered. There is mild  prominence of the lateral ventricles, unchanged. There is no evidence of  restricted diffusion to suggest acute infarction.     The above information was called to and discussed with Dr. Welch.     This report was finalized on 1/25/2018 6:50 AM by Dr. Deandre Van MD.       IR Lumbar Puncture Diagnosis [[id number]] Collected:  01/25/18 1129     Updated:  01/25/18 1150    Narrative:       FLUOROSCOPIC GUIDED LUMBAR PUNCTURE  - 01/25/2018.     HISTORY: Altered mental status.     After signed informed consent was obtained, the patient was prepped and  draped in the prone position. Lidocaine was used for local anesthesia.     An 18-gauge needle was introduced into the thecal sac at a midlumbar  level by Dr. Tejeda. Only a very tiny amount of slightly bloody CSF  was visualized. The needle was repositioned into the thecal sac and  still no significant fluid could be returned. Two additional upper to  mid lumbar levels were attempted and only approximately 1 mL to 2 mL of  slightly bloody CSF could be sampled.     Opening pressure is less than 1 cm to 2 cm.       Impression:        Limited CSF could be sampled during the lumbar puncture as  discussed above. Findings were discussed with Dr. Welch.      Fluoroscopy time 4 minutes 39 seconds. 4 images.        XR Chest 1 View [[id number]] Collected:  01/25/18 1455     Updated:  01/25/18 1455    Narrative:       PORTABLE CHEST X-RAY     HISTORY: Shortness of breath with exertion.     Portable chest x-ray is provided and correlated with CT chest dated  01/20/2018 and chest x-ray 07/25/2013.     FINDINGS: There is a right PICC line with its tip in the mid to lower  portion of the superior vena cava. The cardiomediastinal silhouette  appears normal. Vascular volume is normal. There may be tiny pleural  effusions blunting the costophrenic angles. The lungs appear clear  except for some subtle increased density laterally in the left lung base  which corresponds to the infiltrate seen in the lingula on the recent  CT. There is no pneumothorax. Advanced degenerative change is observed  at each shoulder. There also appear to be chronic full-thickness rotator  cuff tears.       Impression:       PICC line has its tip in the superior vena cava. Some subtle  density in the lingula corresponds to infiltrate seen in that area on CT  5 days ago. The lungs are otherwise clear. There appear to be small  pleural effusions bilaterally.             I personally viewed and interpreted the patient's imaging studies:Has evidence of left-sided pleural effusion with some linear atelectasis on the right base based on the CAT scan from 1/20, the chest x-ray done today showed cardiomegaly, persistent and likely enlarging pleural effusion, and possible new onset trace effusion on the right side.    Assessment:   1. Progressive encephalopathy with no obvious etiology with finding on the MRI suggestive of possible encephalitis versus vasculitis  2. Fever of unknown origin and immunocompromised host on rheumatoid arthritis treatment  3. Progressive respiratory failure with  worsening pleural effusion and possible aspiration pneumonia with an inability to protect airway because of the encephalopathy  4. Acute kidney injury with improvement in creatinine  5. Lower extremity cellulitis on presentation that had improved  6. Abnormal liver imaging suggestive of possible hemangioma          Plan:   This is a pretty complicated case but at this point when he to protect the airways because patient is going into aspiration pneumonia because of the altered mental status.  Discussed with the family and they're agreeable with intubation and mechanical ventilation and when necessary sedation and we'll try to use short acting medications so that we can do repeated neurological reassessment.  Imaging records were reviewed and labs from [place] and from [facility] were reviewed and pertinent information summarized above but so far have any obvious source or possible infection to explain the abnormal finding.  The case was discussed with a neurologist from [facility] with Dr Sosa (rheumatologist)  Per our neurologist and the recommendation were to proceed with a high-dose steroid for possible vasculitis and there was no opposition from the other 2 doctors and I relayed the message is clearly to the family and despite that initial hesitation they were agreeable to allow the steroid as ordered by the neurologist.  As far as her tachycardia she has Cardizem orders, patient is able to oxygenate well and the decision to intubate is based mainly on the need to protect the airways, will cover with sliding scale insulin and possibly insulin drip if needed for any steroid induce hyperglycemia.  Patient may benefit from cerebral angiogram looking for vasculitis and we will follow-up on the vasculitis panel that were sent but results of which are still pending.  I will get an ESR and we'll try to process today.  We'll monitor in the CCU  Family are interested in transfer to a reference Center for further evaluation and  will start the process and will arrange for the transfer to the center of the preference.    Time: Critical care 56 min    Thank you for allowing me to participate in the care of Domitila Valera. Feel free to contact me directly with any further questions or concerns.    Claudette Villegas MD  [facility]   01/25/18  3:23 PM    EMR Dragon/Transcription:   Much of this encounter note is an electronic transcription/translation of spoken language to printed text. The electronic translation of spoken language may permit erroneous, or at times, nonsensical words or phrases to be inadvertently transcribed; Although I have reviewed the note for such errors, some may still exist.

## 2018-01-25 NOTE — PROCEDURES
Endotracheal Intubation Procedure Note    Indication for endotracheal intubation: airway compromise.  Sedation: midazolam and Propofol.  Equipment: A video Glidoscope was used and Dio 3 laryngoscope blade.  Number of attempts: 3.  ETT location confirmed by by auscultation, by CXR and ETCO2 monitor.    Claudette Villegas MD  1/25/2018

## 2018-01-25 NOTE — NURSING NOTE
Patient here in bed from floor, unresponsive. Sister at the bedside. Dr. Tejeda spoke with sister and consent signed by both.

## 2018-01-25 NOTE — SIGNIFICANT NOTE
01/25/18 6179   Rehab Treatment   Discipline occupational therapist   Rehab Evaluation   Evaluation Not Performed patient unavailable for evaluation;other (see comments)  (off floor for lumbar puncture. Will follow up tomorrow for )

## 2018-01-25 NOTE — PROGRESS NOTES
"Progress Note      PATIENT Domitila Valera    1955   MRN [id number]   ADMIT DATE 2018   LENGTH OF STAY 6 days   ATTENDING Elie Morales MD       CHIEF COMPLAINT: Fever (last tyl 1145) and Foot Pain (r/t arthritis)      DIAGNOSIS: FUO (fever of unknown origin) [R50.9]    HISTORY OF PRESENT ILLNESS: There has been no significant clinical change.  The patient still minimally responsive.  No seizure activity is been seen.     PHYSICAL EXAMINATION:  GENERAL: Reveals a man/woman who appears his/her stated age, in no acute distress.  VITAL SIGNS: /91  Pulse (!) 136  Temp 97.9 °F (36.6 °C) (Oral)   Resp (!) 40  Ht 149.9 cm (59\")  Wt 69.7 kg (153 lb 11.2 oz)  SpO2 99%  BMI 31.03 kg/m2  NECK: Carotids are equal without bruits.   HEART: Regular rate and rhythm with no significant murmur or gallop.   EXTREMITIES: Nonedematous. Pulses are intact. There is no rash. There is no hepatosplenomegaly.   NEUROLOGIC: The patient is lying in bed with her eyes closed.  She opens her eyes briefly intermittently during the exam.  Pupils are equal and reactive horizontal eye movements are full corneal reflexes are equal she moves all 4 extremities painful stimulation toes are downgoing neck is supple    DIAGNOSTIC DATA: To 3 mm of CSF were obtained it was bloody.  I talked to the radiologist who says he's quite confident he was in the thecal sac but there seems to be very little CSF there.  Routine studies showed 150,000 red cells with 120 white cells in was 460 glucose was normal    IMPRESSION AND PLAN: I discussed the case again with Dr. Gudino and radiology.  He believes that these high intensity on FLAIR images could be a postcontrast FLAIR (because of the previous MRI) and he feels a \"TIAs\" can show up like this on FLAIR imaging after contrast.  I have never seen this discussed and we have no abnormalities on DWI.  However we do have 2 small areas of restricted diffusion one the cerebellum and one in the frontal " lobe.  Therefore we could consider the possibility that this is vascular but I am not terribly excited about that given the degree of encephalopathy and the lack of any restricted diffusion.  If I take the big picture we do of woman with rheumatoid arthritis who has a pancytopenia, possible myositis in her paraspinal muscles (though CK is normal), small pleural effusion, and known rheumatoid arthritis.  Therefore the possibility of a cerebral vasculitis could be considered.    I agree with the infectious disease consultants that this is unlikely to be infectious though full PCR panel is now pending.    I also talked to the rheumatologist who felt this is unlikely to be related to the rheumatoid arthritis and I would agree with that.  However it's possible she has a primary CNS vasculitis as noted above.    We could consider an arteriogram versus.  Trial of Solu-Medrol.  We can also at some point consider brain biopsy.  I told the family that this was a very complicated case and it might be best to transfer her to [facility].  They would prefer [facility] and therefore we'll see if we can make those arrangements.  If not then I will see a rheumatologist to see her and asked Dr. West's to consider cerebral arteriogram and probably start an empiric trial of Solu-Medrol.  Rheumatologic studies are pending.    Could also consider marantic endocarditis with emboli and could consider DERIK at some point.          Enmanuel Welch MD  1/25/2018  2:50 PM     Add- Rapid called and going to ICU.  She went into rapid AF.  I still think transfer is best idea but will start empiric trial of solumedrol.

## 2018-01-25 NOTE — PROGRESS NOTES
LOS: 6 days     Chief Complaint:  Follow-up fever    History from pt and family.    Interval History:  Went to see patient and she was in a rapid response due to hypoxia. Spoke w/ RN Dia who says she was hypoxic not longer after coming back from her LP. Her O2 recovered on high flow nasal cannula. Her mental status remains very poor. Her HSV PCR returned as negative. Her repeat LP was very bloody with 241,000 RBCs and a high protein. The meningitis/encephalitis PCR panel was negative. I am told she is in Afib (did not get to see the EKG) and is definitely tachycardic reviewing the monitor.    ROS: Cannot obtain due to mental status.    Vital Signs  Temp:  [97.9 °F (36.6 °C)-99.8 °F (37.7 °C)] 97.9 °F (36.6 °C)  Heart Rate:  [] 105  Resp:  [16-24] 16  BP: (141-171)/() 158/78    Physical Exam:  General: lethargic, acutely and chronically ill appearing   Head: wearing cap, no trauma seen  Eyes:  EOMI, no scleral icterus  ENT: MM dry, cracked lips, OP clear, no thrush. Fair dentition.   Neck: Supple  Cardiovascular: tachycardic, irregular rhythm, no murmurs, rubs, or gallops; 1+ pitting LE edema  Respiratory: Lungs w/ scattered rales, increased work of breathing, on 8 L HFNC  GI: Abdomen is soft, non-tender, non-distended  : no Pineda catheter present  Musculoskeletal: B ankle tenderness and swelling  Skin: thin skin on shins, 1 blister noted, no significant erythema  Neurological: Alert and oriented x0, moves all 4 ext spontaneously  Psychiatric: lethargic, groans with movement  Vasc: no cyanosis; PICC w/o erythema    Meds:    Current Facility-Administered Medications:   •  acetaminophen (TYLENOL) suppository 650 mg, 650 mg, Rectal, Q4H PRN, Raad Adams MD, 650 mg at 01/23/18 2123  •  acetaminophen (TYLENOL) tablet 650 mg, 650 mg, Oral, Q4H PRN **OR** acetaminophen (TYLENOL) suppository 650 mg, 650 mg, Rectal, Q4H PRN, RAMANDEEP Pedraza, 650 mg at 01/24/18 1855  •  acetaminophen (TYLENOL) tablet  650 mg, 650 mg, Oral, Q6H PRN, Hugo Elam MD, 650 mg at 01/22/18 2051  •  acyclovir (ZOVIRAX) 620 mg in sodium chloride 0.9 % 100 mL IVPB, 10 mg/kg, Intravenous, Q12H, Dlyon Andrews MD, 620 mg at 01/25/18 1235  •  amLODIPine (NORVASC) tablet 5 mg, 5 mg, Oral, Q24H, Elie Morales MD, Stopped at 01/24/18 1030  •  ceFEPime (MAXIPIME) in SWFI 2g/10ml IV PUSH syringe, 2 g, Intravenous, Q12H, Dylon Andrews MD, 2 g at 01/25/18 1243  •  cycloSPORINE (RESTASIS) 0.05 % ophthalmic emulsion 1 drop, 1 drop, Both Eyes, BID, Hugo Elam MD, 1 drop at 01/25/18 1235  •  dextrose (D50W) solution 50 mL, 50 mL, Intravenous, Q1H PRN, Vinayak Feliciano MD, 50 mL at 01/20/18 0003  •  diltiaZEM (CARDIZEM) 100 mg/100 mL (1 mg/mL) 0.9% NS, 5-15 mg/hr, Intravenous, Continuous, Sarbjit Manzanares MD  •  estradiol (ESTRACE) tablet 1 mg, 1 mg, Oral, Daily, Hugo Elam MD, Stopped at 01/24/18 0900  •  Ferrex 150 forte plus capsule 1 capsule, 1 capsule, Oral, Daily With Breakfast, Hugo Elam MD, Stopped at 01/24/18 0800  •  ferrous sulfate tablet 325 mg, 325 mg, Oral, BID With Meals, Hugo Elam MD, Stopped at 01/23/18 1800  •  gabapentin (NEURONTIN) capsule 200 mg, 200 mg, Oral, Q12H, Hugo Elam MD, Stopped at 01/23/18 1801  •  Hold medication, 1 each, Does not apply, Continuous PRN, Enmanuel Welch MD  •  hydrALAZINE (APRESOLINE) injection 10 mg, 10 mg, Intravenous, Q6H PRN, Elie Morales MD  •  HYDROcodone-acetaminophen (NORCO) 5-325 MG per tablet 1 tablet, 1 tablet, Oral, Q6H PRN, Hugo Elam MD, 1 tablet at 01/22/18 0625  •  metoprolol tartrate (LOPRESSOR) injection 5 mg, 5 mg, Intravenous, Q6H PRN, Elie Morales MD, 5 mg at 01/25/18 1231  •  ondansetron (ZOFRAN) injection 4 mg, 4 mg, Intravenous, Q6H PRN, RAMANDEEP Pedraza  •  pantoprazole (PROTONIX) injection 40 mg, 40 mg, Intravenous, BID AC, Elie Morales MD, 40 mg at 01/23/18 0908  •   Pharmacy to Dose acyclovir (ZOVIRAX), , Does not apply, Continuous PRN, Dylon Andrews MD  •  potassium chloride (MICRO-K) CR capsule 40 mEq, 40 mEq, Oral, PRN **OR** potassium chloride (KLOR-CON) packet 40 mEq, 40 mEq, Oral, PRN **OR** potassium chloride 10 mEq in 100 mL IVPB, 10 mEq, Intravenous, Q1H PRN, Elie Morales MD, Last Rate: 100 mL/hr at 01/25/18 1415, 10 mEq at 01/25/18 1415  •  potassium chloride (MICRO-K) CR capsule 40 mEq, 40 mEq, Oral, Once, Kip Roberson MD  •  sodium chloride 0.9 % flush 1-10 mL, 1-10 mL, Intravenous, PRN, RAMANDEEP Pedraza  •  sodium chloride 0.9 % flush 10 mL, 10 mL, Intracatheter, Q12H, Raad Adams MD, 10 mL at 01/24/18 2225  •  sodium chloride 0.9 % flush 10 mL, 10 mL, Intracatheter, PRN, Raad Adams MD  •  sodium chloride 0.9 % infusion, 75 mL/hr, Intravenous, Continuous, Kip Roberson MD, Last Rate: 75 mL/hr at 01/24/18 2230, 75 mL/hr at 01/24/18 2230  •  temazepam (RESTORIL) capsule 15 mg, 15 mg, Oral, Nightly PRN, Hugo Elam MD, 15 mg at 01/22/18 2051    LABS:    Lab Results   Component Value Date    WBC 3.31 (L) 01/25/2018    HGB 9.1 (L) 01/25/2018    HCT 29.3 (L) 01/25/2018    MCV 75.7 (L) 01/25/2018    PLT 94 (L) 01/25/2018     Lab Results   Component Value Date    GLUCOSE 103 (H) 01/25/2018    BUN 30 (H) 01/25/2018    CREATININE 1.09 (H) 01/25/2018    EGFRIFAFRI 61 01/25/2018    BCR 27.5 (H) 01/25/2018    CO2 19.6 (L) 01/25/2018    CALCIUM 7.3 (L) 01/25/2018    ALBUMIN 1.70 (L) 01/25/2018    LABIL2 0.4 01/25/2018    AST 55 (H) 01/25/2018    ALT 14 01/25/2018    CRP 10.72 (H) 01/20/2018       Cortisol 7  Procal 1-->0.9  Urine Histo Ag negative  HIV negative  Hep C negative  RPR non-reactive  ABI pending  ANCA pending  RF 42  Ferritin 1812    LP Results:  11 WBCs (86% lymphs)  1640 RBCs  Pro 48  Glc 45  Gram stain and culture negative  HSV 1/2 PCR pending    LP #2 results:  241,000 RBCs  123 WBCs (44% PMNs, 51%  Lymphs)  Pro 436  Glc 79  Meningitis/Encephalitis panel negative    Microbiology:  1/19 BCx: negative  1/20 RVP: negative  1/21 SpCx: negative  1/21 CSF Cx: negative    Radiology (personally reviewed):   TTE report reviewed with borderline LVH; EF 65%; grade 1 diastolic dysfunction  CXR reviewed with L effusion and small R effusion; no dense infiltrate seen on my read    Assessment/Plan   1. Fever of unknown origin  2. Encephalitis  3. B ankle pain and swelling  4. Rheumatoid arthritis  5. History of ischemic colitis  6. Acute hypoxic respiratory failure  7. ? L lingular infiltrate  8. Pancytopenia    Fevers are resolved. Repeat LP with very high number of RBCs and protein. The high number of RBCs negates the WBCs and there does not appear to be evidence of infection. HSV PCR now negative x 2 spaced 3-4 days apart strongly argues against HSV encephalitis so will stop IV acyclovir. I added on some additional CNS tests (JOHN virus, VDRL, cytology, Histo Ag, Crypto)    Her hypoxia may be related to some aspiration but also not protecting her airway well due to lethargy. I'll change the cefepime to Zosyn since I'm less concerned about CNS penetration at this point.    There has been discussion of a cerebral angiogram and I think that is a very good next test. Since we have no clear evidence of infection, I am not opposed to a trial of steroids.    Her rheumatologist has been contacting and second-hand I have heard that there is very low suspicion for RA manifesting as CNS disease. ABI and ANCA are pending.    There was mild lymphadenopathy on her scan. LDH slightly elevated. Flow cytometry is pending. Pancytopenia is noted.    Noted plans to transfer to ICU with which I agree given poor ability to protect airway.    Thank you for this consult. ID will follow. I have discussed this case with Dr Welch and her family.

## 2018-01-25 NOTE — PROGRESS NOTES
"DAILY PROGRESS NOTE  [facility]    Patient Identification:  Name: Domitila Valera  Age: 62 y.o.  Sex: female  :  1955  MRN: [id number]         Primary Care Physician: Javier Soriano III, MD    Subjective:  Interval History:She complains of pain in feet.  She is more confused.    Objective:    Scheduled Meds:    amLODIPine 5 mg Oral Q24H   cycloSPORINE 1 drop Both Eyes BID   estradiol 1 mg Oral Daily   Ferrex 150 forte plus 1 capsule Oral Daily With Breakfast   ferrous sulfate 325 mg Oral BID With Meals   gabapentin 200 mg Oral Q12H   methylPREDNISolone sodium succinate 1,000 mg Intravenous Q24H   pantoprazole 40 mg Intravenous BID AC   piperacillin-tazobactam 3.375 g Intravenous Once   piperacillin-tazobactam 3.375 g Intravenous Q8H   potassium chloride 40 mEq Oral Once   sodium chloride 10 mL Intracatheter Q12H     Continuous Infusions:    diltiaZEM 5-15 mg/hr Last Rate: 5 mg/hr (18 1433)   hold 1 each    sodium chloride 75 mL/hr Last Rate: 75 mL/hr (18 2230)       Vital signs in last 24 hours:  Temp:  [97.9 °F (36.6 °C)-99.8 °F (37.7 °C)] 97.9 °F (36.6 °C)  Heart Rate:  [] 136  Resp:  [16-40] 40  BP: (134-171)/() 147/91    Intake/Output:    Intake/Output Summary (Last 24 hours) at 18 1530  Last data filed at 18 2155   Gross per 24 hour   Intake              300 ml   Output                0 ml   Net              300 ml       Exam:  /91  Pulse (!) 136  Temp 97.9 °F (36.6 °C) (Oral)   Resp (!) 40  Ht 149.9 cm (59\")  Wt 69.7 kg (153 lb 11.2 oz)  SpO2 99%  BMI 31.03 kg/m2    General Appearance:    confused, no distress   Head:    Normocephalic, without obvious abnormality, atraumatic   Eyes:       Throat:   Lips, tongue, gums normal   Neck:   Supple, symmetrical, trachea midline, no JVD   Lungs:     Clear to auscultation bilaterally, respirations unlabored   Chest Wall:    No tenderness or deformity    Heart:    irregular rate and rhythm, S1 and S2 " normal, no murmur,no  Rub or gallop   Abdomen:     Soft, non-tender, bowel sounds active, no masses, no organomegaly    Extremities:   Extremities normal, atraumatic, no cyanosis or edema, cellulitis in lower extremity.   Pulses:      Skin:   Skin is warm and dry,  no rashes or palpable lesions   Neurologic:   Confused.      [unfilled]  Data Review:    Results from last 7 days  Lab Units 01/25/18  0551 01/24/18  0249 01/23/18  0542   SODIUM mmol/L 145 143 137   POTASSIUM mmol/L 3.1* 3.2* 3.4*   CHLORIDE mmol/L 115* 113* 105   CO2 mmol/L 19.6* 18.3* 20.6*   BUN mg/dL 30* 28* 26*   CREATININE mg/dL 1.09* 1.04* 1.04*   GLUCOSE mg/dL 103* 80 73   CALCIUM mg/dL 7.3* 6.6* 6.8*       Results from last 7 days  Lab Units 01/25/18  0551 01/24/18  0249 01/23/18  0542   WBC 10*3/mm3 3.31* 2.75* 3.39*   HEMOGLOBIN g/dL 9.1* 7.3* 7.4*   HEMATOCRIT % 29.3* 24.3* 24.2*   PLATELETS 10*3/mm3 94* 69* 98*       Results from last 7 days  Lab Units 01/24/18  0249   TSH mIU/mL 0.295       Results from last 7 days  Lab Units 01/24/18  0249   HEMOGLOBIN A1C % 5.60     No results found for: TROPONINT    Results from last 7 days  Lab Units 01/24/18  0249   CHOLESTEROL mg/dL 102   TRIGLYCERIDES mg/dL 347*   HDL CHOL mg/dL 9*       Results from last 7 days  Lab Units 01/25/18  0551 01/23/18  0542 01/22/18  0455   ALK PHOS U/L 277* 268* 346*   BILIRUBIN mg/dL 0.4 0.4 0.4   ALT (SGPT) U/L 14 14 19   AST (SGOT) U/L 55* 40* 56*       Results from last 7 days  Lab Units 01/24/18  0249   TSH mIU/mL 0.295       Results from last 7 days  Lab Units 01/24/18  0249   HEMOGLOBIN A1C % 5.60     Glucose   Date/Time Value Ref Range Status   01/25/2018 1356 92 70 - 130 mg/dL Final           Patient Active Problem List   Diagnosis Code   • FUO (fever of unknown origin) R50.9   • Hyperkalemia E87.5   • Benign essential HTN I10   • Cellulitis of right upper arm L03.113   • Hyponatremia E87.1   • JEFFERY (acute kidney injury) N17.9   • Anemia D64.9   • GI bleed K92.2    • Cellulitis L03.90   • Thrombocytopenia D69.6   • Paroxysmal atrial fibrillation I48.0       Assessment:  Active Hospital Problems (** Indicates Principal Problem)    Diagnosis Date Noted   • **FUO (fever of unknown origin) [R50.9] 01/19/2018   • Paroxysmal atrial fibrillation [I48.0] 01/25/2018   • Thrombocytopenia [D69.6] 01/24/2018   • Cellulitis [L03.90] 01/22/2018   • GI bleed [K92.2] 01/21/2018   • Anemia [D64.9] 01/20/2018   • Hyperkalemia [E87.5] 01/19/2018   • Hyponatremia [E87.1] 01/19/2018   • JEFFERY (acute kidney injury) [N17.9] 01/19/2018      Resolved Hospital Problems    Diagnosis Date Noted Date Resolved   No resolved problems to display.       Plan:  ID consult noted.Neuro and cardiology consults noted  Work up in progress  plans and antibiotics per  ID.    Protonix IV    . Cardizem drip control rate. Move to CCU agree with trial of high dose steroids. Reviewed records from [facility] and discussed with Dr Bakari Morales MD  1/25/2018  3:30 PM

## 2018-01-25 NOTE — SIGNIFICANT NOTE
01/25/18 0916   Rehab Evaluation   Evaluation Not Performed other (see comments)  (Spoke with nursing (Dia). Pt. going for lumbar puncture this day. Will follow up tomorrow.)   Recommendation   PT - Next Appointment 01/26/18

## 2018-01-25 NOTE — PROGRESS NOTES
LOS: 6 days   Patient Care Team:  Javier Soriano III, MD as PCP - General (Internal Medicine)    Chief Complaint: Follow-up sinus tachycardia    Interval History: Obtunded this AM.  Still with sinus tachycardia.      Vital Signs:  Temp:  [97.9 °F (36.6 °C)-99.8 °F (37.7 °C)] 97.9 °F (36.6 °C)  Heart Rate:  [109-124] 120  Resp:  [16-24] 16  BP: (129-171)/() 152/80    Intake/Output Summary (Last 24 hours) at 01/25/18 0855  Last data filed at 01/24/18 2155   Gross per 24 hour   Intake              300 ml   Output                0 ml   Net              300 ml       Physical Exam:   General Appearance:    Somnolent, arouses to tactile response.  Does not follow commands.    Lungs:     Clear to auscultation bilaterally     Heart:    Regular rhythm and normal rate.  No murmurs, gallops, or       rubs.   Abdomen:     Soft, non-tender, non-distended.    Extremities:   Trace to 1+ edema.     Results Review:      Results from last 7 days  Lab Units 01/25/18  0551   SODIUM mmol/L 145   POTASSIUM mmol/L 3.1*   CHLORIDE mmol/L 115*   CO2 mmol/L 19.6*   BUN mg/dL 30*   CREATININE mg/dL 1.09*   GLUCOSE mg/dL 103*   CALCIUM mg/dL 7.3*       Results from last 7 days  Lab Units 01/25/18  0551   CK TOTAL U/L 152       Results from last 7 days  Lab Units 01/25/18  0551   WBC 10*3/mm3 3.31*   HEMOGLOBIN g/dL 9.1*   HEMATOCRIT % 29.3*   PLATELETS 10*3/mm3 94*           Results from last 7 days  Lab Units 01/24/18  0249   CHOLESTEROL mg/dL 102       Results from last 7 days  Lab Units 01/21/18  1937   MAGNESIUM mg/dL 1.9       Results from last 7 days  Lab Units 01/24/18  0249   CHOLESTEROL mg/dL 102   TRIGLYCERIDES mg/dL 347*   HDL CHOL mg/dL 9*       I reviewed the patient's new clinical results.        Assessment:  1. Fever of unknown origin  2. Possible lingular and ADDISON pneumonia  3. Possible SAH hemorrhage  4. Altered mental status (TME)  5. Melena (GI bleed)  6. Acute kidney injury  7. Pancytopenia   8. Rheumatoid arthritis    9. Recent ischemic colitis   10. Sinus tachycardia     Plan:  -Neuro does not feel that this is a true SAH.  -Patient still obtunded with AMS.  -Echocardiogram with normal EF and no embolic source noted.  -Sinus tachycardia is physiological and expected in this clinical scenario.    Will sign-off for now.  Please call if needed. Thanks.    Sarbjit Manzanares MD  01/25/18  8:55 AM

## 2018-01-25 NOTE — PROGRESS NOTES
"   LOS: 6 days   Patient Care Team:  Javier Soriano III, MD as PCP - General (Internal Medicine)    Chief Complaint/ Reason for encounter: Acute renal failure, hypokalemia        Subjective     Fever    Pertinent negatives include no chest pain or nausea.   Foot Pain   Associated symptoms include a fever and weakness. Pertinent negatives include no chest pain or nausea.       Subjective:  Symptoms:  Worsening.  She reports weakness.  No shortness of breath or chest pain.  (Remains confused, low-grade fevers occasionally  Worsening tachycardia, hypotension, persistent encephalopathy, noted plans for transfer to the CCU).    Diet:  Poor intake.  No nausea.    Activity level: Impaired due to weakness.    Pain:  She reports no pain.          History taken from: Patient and chart    Objective     Vital Signs  Temp:  [97.9 °F (36.6 °C)-99.8 °F (37.7 °C)] 97.9 °F (36.6 °C)  Heart Rate:  [] 136  Resp:  [16-40] 40  BP: (134-171)/() 147/91       Wt Readings from Last 1 Encounters:   01/25/18 0500 69.7 kg (153 lb 11.2 oz)   01/24/18 0500 67.6 kg (149 lb)   01/23/18 0537 65 kg (143 lb 4.8 oz)   01/22/18 0500 61.7 kg (136 lb)   01/21/18 0737 63.3 kg (139 lb 8 oz)   01/20/18 0546 63.1 kg (139 lb 1.6 oz)   01/19/18 2243 61.6 kg (135 lb 14.4 oz)   01/19/18 1351 57.6 kg (127 lb)       Objective:  General Appearance:  Comfortable, ill-appearing, in no acute distress and not in pain (Thin, mildly cachectic, confused).    Vital signs: (most recent): Blood pressure 147/91, pulse (!) 136, temperature 97.9 °F (36.6 °C), temperature source Oral, resp. rate (!) 40, height 149.9 cm (59\"), weight 69.7 kg (153 lb 11.2 oz), SpO2 99 %.  Vital signs are normal.  Fever.    Output: Producing urine.    HEENT: Normal HEENT exam.    Lungs:  Normal respiratory rate and normal effort.  She is not in respiratory distress.  Breath sounds clear to auscultation.  There are decreased breath sounds.  No wheezes or rales.    Heart: Tachycardia.  " Irregular rhythm.    Abdomen: Abdomen is soft and non-distended.  Bowel sounds are normal.   There is no abdominal tenderness.  There is no epigastric area or no suprapubic area tenderness.  There is no rebound tenderness.   There is no mass.   Extremities: Normal range of motion.  There is no deformity or dependent edema.    Pulses: Distal pulses are intact.    Skin:  Warm and dry.  There is a rash.  No ecchymosis or ulceration.             Results Review:    Past Medical History: reviewed and updated  Past Medical History:   Diagnosis Date   • Hypertension    • Ischemic colitis    • Rheumatoid arthritis    • Thyroid nodule          Allergies:  Allergies   Allergen Reactions   • Buffered Aspirin Rash and Shortness Of Breath   • Ace Inhibitors      cough   • Aurothioglucose Diarrhea     Increased AST & ALT   • Latex Itching   • Keflex [Cephalexin] Hives and Rash     Tolerated cephalosporins 1/2018       Intake/Output:     Intake/Output Summary (Last 24 hours) at 01/25/18 1545  Last data filed at 01/24/18 2155   Gross per 24 hour   Intake              300 ml   Output                0 ml   Net              300 ml         DATA:  Interval chart, labs and notes reviewed.  The following labs personally reviewed by me.  Renal ultrasound personally reviewed, no obstruction  I personally reviewed her old records from [facility], baseline creatinine 1.4  Discussed with her son at bedside, interval history obtained from him as well regarding recent health and complaints    Labs:   Recent Results (from the past 24 hour(s))   Prepare RBC, 1 Units    Collection Time: 01/24/18  6:45 PM   Result Value Ref Range    Product Code L2730F52     Unit Number [id number]     UNIT  ABO O     UNIT  RH POS     Dispense Status IS     Blood Type [facility]     Blood Expiration Date 944415627833     Blood Type Barcode 5100    CK    Collection Time: 01/25/18  5:51 AM   Result Value Ref Range    Creatine Kinase 152 20 - 180 U/L   CBC (No Diff)     Collection Time: 01/25/18  5:51 AM   Result Value Ref Range    WBC 3.31 (L) 4.50 - 10.70 10*3/mm3    RBC 3.87 (L) 3.90 - 5.20 10*6/mm3    Hemoglobin 9.1 (L) 11.9 - 15.5 g/dL    Hematocrit 29.3 (L) 35.6 - 45.5 %    MCV 75.7 (L) 80.5 - 98.2 fL    MCH 23.5 (L) 26.9 - 32.0 pg    MCHC 31.1 (L) 32.4 - 36.3 g/dL    RDW 23.4 (H) 11.7 - 13.0 %    RDW-SD 63.1 (H) 37.0 - 54.0 fl    Platelets 94 (L) 140 - 500 10*3/mm3   Comprehensive Metabolic Panel    Collection Time: 01/25/18  5:51 AM   Result Value Ref Range    Glucose 103 (H) 65 - 99 mg/dL    BUN 30 (H) 8 - 23 mg/dL    Creatinine 1.09 (H) 0.57 - 1.00 mg/dL    Sodium 145 136 - 145 mmol/L    Potassium 3.1 (L) 3.5 - 5.2 mmol/L    Chloride 115 (H) 98 - 107 mmol/L    CO2 19.6 (L) 22.0 - 29.0 mmol/L    Calcium 7.3 (L) 8.6 - 10.5 mg/dL    Total Protein 6.2 6.0 - 8.5 g/dL    Albumin 1.70 (L) 3.50 - 5.20 g/dL    ALT (SGPT) 14 1 - 33 U/L    AST (SGOT) 55 (H) 1 - 32 U/L    Alkaline Phosphatase 277 (H) 39 - 117 U/L    Total Bilirubin 0.4 0.1 - 1.2 mg/dL    eGFR  African Amer 61 >60 mL/min/1.73    Globulin 4.5 gm/dL    A/G Ratio 0.4 g/dL    BUN/Creatinine Ratio 27.5 (H) 7.0 - 25.0    Anion Gap 10.4 mmol/L   Magnesium    Collection Time: 01/25/18  5:51 AM   Result Value Ref Range    Magnesium 1.9 1.6 - 2.4 mg/dL   Prepare Platelet Pheresis, 1 Units    Collection Time: 01/25/18  6:00 AM   Result Value Ref Range    Product Code Q6549K56     Unit Number [id number]     UNIT  ABO AB     UNIT  RH POS     Dispense Status PT     Blood Type ABPOS     Blood Expiration Date [id number]     Blood Type Barcode 8400    Glucose, CSF - Cerebrospinal Fluid, Lumbar Puncture    Collection Time: 01/25/18 10:46 AM   Result Value Ref Range    Glucose, CSF 79 (H) 40 - 70 mg/dL   Protein, CSF - Cerebrospinal Fluid, Lumbar Puncture    Collection Time: 01/25/18 10:46 AM   Result Value Ref Range    Protein, Total (CSF) 463.0 (H) 15.0 - 45.0 mg/dL   Cell Count, CSF - Cerebrospinal Fluid, Lumbar Puncture     Collection Time: 01/25/18 10:46 AM   Result Value Ref Range    Color, CSF Red (A) Colorless    Supernatant Color, CSF Red (A) Colorless    Appearance, CSF Cloudy (A) Clear    RBC, CSF 499148 (H) 0 - 0 /mm3    Nucleated Cells,  (H) 0 - 5 /mm3    Tube Number, CSF 2     Method: Automated XE 5000 Method    Spinal fluid differential - Cerebrospinal Fluid, Lumbar Puncture    Collection Time: 01/25/18 10:46 AM   Result Value Ref Range    Neutrophils, CSF 44 %    Lymphocytes, CSF 51 %    Monocytes, CSF 5 %    nRBC, CSF 1 /100 WBCs   Meningitis / Encephalitis Panel, PCR - Cerebrospinal Fluid, Lumbar Puncture    Collection Time: 01/25/18 10:46 AM   Result Value Ref Range    ESCHERICHIA COLI K1, PCR Not Detected Not Detected    HAEMOPHILUS INFLUENZAE, PCR Not Detected Not Detected    LISTERIA MONOCYTOGENES, PCR Not Detected Not Detected    NEISSERIA MENINGITIDIS, PCR Not Detected Not Detected    STREPTOCOCCUS AGALACTIAE, PCR Not Detected Not Detected    STREPTOCOCCUS PNEUMONIAE, PCR Not Detected Not Detected    CYTOMEGALOVIRUS (CMV), PCR Not Detected Not Detected    ENTEROVIRUS, PCR Not Detected Not Detected    HERPES SIMPLEX VIRUS 1 (HSV-1), PCR Not Detected Not Detected    HERPES SIMPLEX VIRUS 2 (HSV-2), PCR Not Detected Not Detected    HUMAN PARECHOVIRUS, PCR Not Detected Not Detected    VARICELLA ZOSTER VIRUS (VZV), PCR Not Detected Not Detected    CRYPTOCOCCUS NEOFORMANS / GATTII, PCR Not Detected Not Detected    HUMAN HERPES VIRUS 6 PCR Not Detected Not Detected   POC Glucose Once    Collection Time: 01/25/18  1:56 PM   Result Value Ref Range    Glucose 92 70 - 130 mg/dL   Blood Gas, Arterial    Collection Time: 01/25/18  2:05 PM   Result Value Ref Range    Site Arterial: right radial     Musa's Test Positive     pH, Arterial 7.431 7.350 - 7.450 pH units    pCO2, Arterial 28.7 (L) 35.0 - 45.0 mm Hg    pO2, Arterial 81.9 80.0 - 100.0 mm Hg    HCO3, Arterial 19.1 (L) 22.0 - 28.0 mmol/L    Base Excess, Arterial -4.3  (L) 0.0 - 2.0 mmol/L    O2 Saturation Calculated 96.5 92.0 - 99.0 %    Barometric Pressure for Blood Gas 759.6 mmHg    Modality HFNC     Flow Rate 15 lpm    Set [facility] Resp Rate 32     Rate 32 Breaths/minute       Radiology:  Imaging Results (last 24 hours)     Procedure Component Value Units Date/Time    MRI Angiogram Head Without Contrast [[id number]] Collected:  01/24/18 0013     Updated:  01/24/18 3553    Narrative:       MRI ANGIOGRAM HEAD - WITHOUT GADOLINIUM INTRAVENOUS CONTRAST.     TECHNIQUE: Routine brain MRA was performed. 3-D time-of-flight technique  was used without intravenous contrast. Source and MIP images were  reviewed.      HISTORY: Neurological change, weakness.     COMPARISON: 01/22/2018.     FINDINGS:     Abnormal gyral high signal is seen on the diffusion sequence is in the  right frontal lobe, anterior right temporal lobe and left temporal lobe.  Findings of small vessel ischemic disease, a few old lacunar infarcts  and cortical atrophy.     Bilateral internal carotid arteries are symmetric and patent. Their  bifurcations are symmetric and patent.      Anterior cerebral arteries are patent. The middle cerebral arteries are  patent.      Distal vertebral arteries are patent. The posterior inferior cerebellar  arteries (PICAs) are symmetric and patent.      The basilar artery is continuously patent. The superior cerebellar  arteries are symmetric and patent.      The P1 segments are patent. The posterior cerebral arteries (PCAs) are  patent. The posterior communicating arteries are symmetric and patent.      1 cm on Tornwaldt cyst is suspected, further evaluation is recommended.       Impression:       Moderate amount of subarachnoid hemorrhage layering the right frontal  lobe and both temporal lobes. Differential diagnoses includes  encephalitis.  1 cm on Tornwaldt cyst is suspected, further evaluation with CT scan or  MRI of the head and neck may be performed.     Findings called to patient's  nurse Ms. Cortez, 12:13 AM.     This report was finalized on 1/24/2018 11:50 PM by Dr. Mayelin Carter MD.       MRI Thoracic Spine With & Without Contrast [[id number]] Collected:  01/24/18 0047     Updated:  01/24/18 2353    Narrative:       MRI OF THE THORACIC SPINE WITHOUT AND WITH CONTRAST.     TECHNIQUE: Multisequence multiplanar MRI images of the thoracic spine.     HISTORY: Mid back thoracic pain.     COMPARISON: No prior studies for comparison.     FINDINGS:  Vertebral body height and signal is normal, no acute fracture.   Intervertebral discs demonstrates mild desiccation changes in the  midthoracic spine. Small disc osteophyte is seen at T3-4 causing mild  spinal canal stenosis.     Thoracic spinal cord demonstrates normal caliber, no atrophy or  swelling.     Small left pleural effusion is incidentally noted.       Impression:       Mild spondylosis of the thoracic spine, small disc osteophyte complex at  T3-4. No fracture or dislocation.  Small left pleural effusion.     This report was finalized on 1/24/2018 11:50 PM by Dr. Mayelin Carter MD.       MRI Brain With & Without Contrast [[id number]] Collected:  01/24/18 1058     Updated:  01/25/18 0653    Narrative:       MRI EXAMINATION OF THE BRAIN WITH AND WITHOUT CONTRAST     HISTORY: Confusion, altered mental status.     COMPARISON: MRI of the brain 01/22/2018, MRA 01/23/2018 and CT angiogram  01/24/2018.     FINDINGS: There is no evidence of restricted diffusion to suggest acute  infarction.     The axial T2 FLAIR sequence demonstrates increased signal intensity  involving the sulci of the right frontal lobe laterally as well as  extending superiorly to the vertex, more prominent as compared to the T2  FLAIR examination performed on 01/22/2018. There is also increased  signal intensity involving the cortex of the right frontal lobe  laterally evident on the T2 and T2 FLAIR sequences. A similar process is  appreciated involving the left temporal lobe  laterally, smaller in area.  There is increased signal intensity involving the sulci involving the  parietal lobes bilaterally, present previously.     There is increased signal intensity appreciated involving the basal  ganglion on the right inferiorly on the T2 FLAIR sequence. This area  measures approximately 9 mm in transverse dimension and is more  prominent as compared to the prior examination. There are small foci of  increased signal intensity appreciated involving the white matter of the  cerebellar hemispheres bilaterally on the T2 FLAIR sequence which are  slightly more prominent as compared to prior examination.     The axial T2 FLAIR sequence demonstrates increased signal intensity  involving the sulci overlying the left frontal lobe laterally which is  new versus the MRI examination of 01/23/2018.     There is mild prominence of the lateral ventricles, similar in  appearance compared to prior examination.     The axial T1 precontrast sequence demonstrates a mild degree of  increased signal intensity involving the right frontal lobe laterally.  This may be related to the contrast which was administered on  01/22/2018. Petechial hemorrhage could also be considered but is less  likely. There is no evidence of signal loss on the susceptibility  weighted imaging at this location.     After contrast administration there was increased signal intensity  involving the cortex as well as sulci of the right frontal lobe  laterally and involving the left temporal lobe laterally to a lesser  extent. There is faint enhancement involving sulci in the left temporal  region laterally. There is enhancement involving the inferior aspect of  the basal ganglia on the right (approximately 6 mm) as well as  enhancement involving the left cerebellar hemisphere centrally (4-5 mm  in size). This cerebellar enhancement was not present previously. The  enhancement involving the basal ganglia on the right inferiorly was  not  present previously.       Impression:       Complex and unusual presentation with multifocal areas of  sulcal T2 FLAIR hyperintensity as well as areas of T2 and T2 FLAIR  hyperintensity involving the right frontal lobe laterally, left temporal  lobe laterally, basal ganglia inferiorly and left cerebellar hemisphere  centrally. These areas are more prominent as compared to the prior  examination and there is enhancement involving the left cerebral  hemisphere, basal ganglia on the right inferiorly and sulcal  hyperintensity evident on the postcontrast sequence as well. The  multivascular distribution suggests a multifocal process with breakdown  of the blood-brain barrier. This may be secondary to an inflammatory  infectious process such as meningitis or encephalitis. However no  convincing areas of T2 FLAIR hyperintensity are noted within the  dependent portions of the ventricular system or involving the basilar  cisterns. A vasculitis could also be considered. There is mild  prominence of the lateral ventricles, unchanged. There is no evidence of  restricted diffusion to suggest acute infarction.     The above information was called to and discussed with Dr. Welch.     This report was finalized on 1/25/2018 6:50 AM by Dr. Deandre Van MD.       IR Lumbar Puncture Diagnosis [[id number]] Collected:  01/25/18 1129     Updated:  01/25/18 1150    Narrative:       FLUOROSCOPIC GUIDED LUMBAR PUNCTURE  - 01/25/2018.     HISTORY: Altered mental status.     After signed informed consent was obtained, the patient was prepped and  draped in the prone position. Lidocaine was used for local anesthesia.     An 18-gauge needle was introduced into the thecal sac at a midlumbar  level by Dr. Tejeda. Only a very tiny amount of slightly bloody CSF  was visualized. The needle was repositioned into the thecal sac and  still no significant fluid could be returned. Two additional upper to  mid lumbar levels were attempted and  only approximately 1 mL to 2 mL of  slightly bloody CSF could be sampled.     Opening pressure is less than 1 cm to 2 cm.       Impression:       Limited CSF could be sampled during the lumbar puncture as  discussed above. Findings were discussed with Dr. Welch.      Fluoroscopy time 4 minutes 39 seconds. 4 images.        XR Chest 1 View [[id number]] Collected:  01/25/18 1455     Updated:  01/25/18 1455    Narrative:       PORTABLE CHEST X-RAY     HISTORY: Shortness of breath with exertion.     Portable chest x-ray is provided and correlated with CT chest dated  01/20/2018 and chest x-ray 07/25/2013.     FINDINGS: There is a right PICC line with its tip in the mid to lower  portion of the superior vena cava. The cardiomediastinal silhouette  appears normal. Vascular volume is normal. There may be tiny pleural  effusions blunting the costophrenic angles. The lungs appear clear  except for some subtle increased density laterally in the left lung base  which corresponds to the infiltrate seen in the lingula on the recent  CT. There is no pneumothorax. Advanced degenerative change is observed  at each shoulder. There also appear to be chronic full-thickness rotator  cuff tears.       Impression:       PICC line has its tip in the superior vena cava. Some subtle  density in the lingula corresponds to infiltrate seen in that area on CT  5 days ago. The lungs are otherwise clear. There appear to be small  pleural effusions bilaterally.                Medications have been reviewed:  Current Facility-Administered Medications   Medication Dose Route Frequency Provider Last Rate Last Dose   • acetaminophen (TYLENOL) suppository 650 mg  650 mg Rectal Q4H PRN Raad Adams MD   650 mg at 01/23/18 2123   • acetaminophen (TYLENOL) tablet 650 mg  650 mg Oral Q4H PRN RAMANDEEP Pedraza        Or   • acetaminophen (TYLENOL) suppository 650 mg  650 mg Rectal Q4H PRN RAMANDEEP Pedraza   650 mg at 01/24/18 1857   • acetaminophen  (TYLENOL) tablet 650 mg  650 mg Oral Q6H PRN Hugo Elam MD   650 mg at 01/22/18 2051   • amLODIPine (NORVASC) tablet 5 mg  5 mg Oral Q24H Elie Morales MD   Stopped at 01/24/18 1030   • cycloSPORINE (RESTASIS) 0.05 % ophthalmic emulsion 1 drop  1 drop Both Eyes BID Hugo Elam MD   1 drop at 01/25/18 1235   • dextrose (D50W) solution 50 mL  50 mL Intravenous Q1H PRN Vinayak Feliciano MD   50 mL at 01/20/18 0003   • diltiaZEM (CARDIZEM) 100 mg/100 mL (1 mg/mL) 0.9% NS  5-15 mg/hr Intravenous Continuous Sarbjit Manzanares MD 5 mL/hr at 01/25/18 1433 5 mg/hr at 01/25/18 1433   • estradiol (ESTRACE) tablet 1 mg  1 mg Oral Daily Huog Elam MD   Stopped at 01/24/18 0900   • Ferrex 150 forte plus capsule 1 capsule  1 capsule Oral Daily With Breakfast Hugo Elam MD   Stopped at 01/24/18 0800   • ferrous sulfate tablet 325 mg  325 mg Oral BID With Meals Hugo Elam MD   Stopped at 01/23/18 1800   • gabapentin (NEURONTIN) capsule 200 mg  200 mg Oral Q12H Hugo Elam MD   Stopped at 01/23/18 1801   • Hold medication  1 each Does not apply Continuous PRN Enmanuel Welch MD       • hydrALAZINE (APRESOLINE) injection 10 mg  10 mg Intravenous Q6H PRN Elie Morales MD       • HYDROcodone-acetaminophen (NORCO) 5-325 MG per tablet 1 tablet  1 tablet Oral Q6H PRN Hugo Elam MD   1 tablet at 01/22/18 0625   • methylPREDNISolone sod succ 1 g/100 mL 0.9% NS VTB (mbp)  1,000 mg Intravenous Q24H Enmanuel Welch MD       • metoprolol tartrate (LOPRESSOR) injection 5 mg  5 mg Intravenous Q6H PRN Elie Morales MD   5 mg at 01/25/18 1231   • ondansetron (ZOFRAN) injection 4 mg  4 mg Intravenous Q6H PRN RAMANDEEP Pedraza       • pantoprazole (PROTONIX) injection 40 mg  40 mg Intravenous BID AC Elie Morales MD   40 mg at 01/23/18 0908   • piperacillin-tazobactam (ZOSYN) 3.375 g in iso-osmotic dextrose 50 ml (premix)  3.375 g Intravenous Once Dylon Lomax  MD Juliana       • piperacillin-tazobactam (ZOSYN) 3.375 g in iso-osmotic dextrose 50 ml (premix)  3.375 g Intravenous Q8H Dylon Andrews MD       • potassium chloride (MICRO-K) CR capsule 40 mEq  40 mEq Oral PRN Elie Morales MD        Or   • potassium chloride (KLOR-CON) packet 40 mEq  40 mEq Oral PRN Elie Morales MD        Or   • potassium chloride 10 mEq in 100 mL IVPB  10 mEq Intravenous Q1H PRN Elie Morales  mL/hr at 01/25/18 1415 10 mEq at 01/25/18 1415   • potassium chloride (MICRO-K) CR capsule 40 mEq  40 mEq Oral Once Kip Roberson MD       • sodium chloride 0.9 % flush 1-10 mL  1-10 mL Intravenous PRN RAMANDEEP Pedraza       • sodium chloride 0.9 % flush 10 mL  10 mL Intracatheter Q12H Raad Adams MD   10 mL at 01/25/18 0900   • sodium chloride 0.9 % flush 10 mL  10 mL Intracatheter PRN Raad Adams MD       • sodium chloride 0.9 % infusion  75 mL/hr Intravenous Continuous Kip Roberson MD 75 mL/hr at 01/24/18 2230 75 mL/hr at 01/24/18 2230   • temazepam (RESTORIL) capsule 15 mg  15 mg Oral Nightly PRN Hugo Elam MD   15 mg at 01/22/18 2051       Assessment/Plan     Principal Problem:    FUO (fever of unknown origin)  Active Problems:    Hyperkalemia    Hyponatremia    JEFFERY (acute kidney injury)    Anemia    GI bleed    Cellulitis    Thrombocytopenia    Paroxysmal atrial fibrillation      Assessment:  (Assesment  JEFFERY secondary to decreased intravascular volume and sespsis secondary to cellulitis , stable, creat 1.1    Chronic kidney disease, recent baseline creatinine around 1.4, below prior baseline now     HYperkalemia , improved, now low     Hyponatremia , hypovolemic, better today with fluids     LE cellulitis     Metabolic acidosis, improved after bicarbonate drip     RA     HTN         Plan:  Renal function remains stable, slowly improved  Volume and electrolytes stable today  Potassium is being replaced IV per protocol  Noted plans for transfer  to the ICU, trial of IV steroids).             Continue to monitor renal function, electroytes and volume closely   Please call me with any questions or concerns      Kip Roberson MD   [facility]   [phone]    01/25/18  3:45 PM      Dictation performed using Dragon dictation software

## 2018-01-25 NOTE — CODE DOCUMENTATION
ABG being done on 9liter high flow sat went up to 100%, high flow oxygen set up reconnected bubbling better.  Stat Chest xray ordered.  /93 sat 93   Call out to Dr Luis Meeks talking to nazia.  Breayth sounds clear in 30's decreased in bases stephanie.  ABG obtained and can start decreasing oxygen down to 6l high flow.

## 2018-01-25 NOTE — CODE DOCUMENTATION
Order for medication from Dr Carroll and also primary RN spoke with Dr Morales.  RR 40 sat 97%.  /114.  Infectious disease MD here to see pt and spoke with family

## 2018-01-26 PROBLEM — R76.8 POSITIVE ANA (ANTINUCLEAR ANTIBODY): Status: ACTIVE | Noted: 2018-01-26

## 2018-01-26 PROBLEM — I77.82 ANCA-POSITIVE VASCULITIS (HCC): Status: ACTIVE | Noted: 2018-01-26

## 2018-01-26 PROBLEM — G93.40 ENCEPHALOPATHY: Status: ACTIVE | Noted: 2018-01-26

## 2018-01-26 PROBLEM — M06.9 RHEUMATOID ARTHRITIS: Status: ACTIVE | Noted: 2018-01-26

## 2018-01-26 PROBLEM — J18.9 PNA (PNEUMONIA): Status: ACTIVE | Noted: 2018-01-26

## 2018-01-26 LAB
ANION GAP SERPL CALCULATED.3IONS-SCNC: 15.5 MMOL/L
BUN BLD-MCNC: 43 MG/DL (ref 8–23)
BUN/CREAT SERPL: 28.1 (ref 7–25)
C-ANCA TITR SER IF: ABNORMAL TITER
CALCIUM SPEC-SCNC: 8.4 MG/DL (ref 8.6–10.5)
CHLORIDE SERPL-SCNC: 111 MMOL/L (ref 98–107)
CO2 SERPL-SCNC: 17.5 MMOL/L (ref 22–29)
CREAT BLD-MCNC: 1.53 MG/DL (ref 0.57–1)
CRYPTOC AG CSF QL: NEGATIVE
DEPRECATED RDW RBC AUTO: 65.4 FL (ref 37–54)
ERYTHROCYTE [DISTWIDTH] IN BLOOD BY AUTOMATED COUNT: 24.1 % (ref 11.7–13)
GFR SERPL CREATININE-BSD FRML MDRD: 42 ML/MIN/1.73
GLUCOSE BLD-MCNC: 152 MG/DL (ref 65–99)
GLUCOSE BLDC GLUCOMTR-MCNC: 163 MG/DL (ref 70–130)
GLUCOSE BLDC GLUCOMTR-MCNC: 168 MG/DL (ref 70–130)
GLUCOSE BLDC GLUCOMTR-MCNC: 175 MG/DL (ref 70–130)
GLUCOSE BLDC GLUCOMTR-MCNC: 185 MG/DL (ref 70–130)
GLUCOSE BLDC GLUCOMTR-MCNC: 202 MG/DL (ref 70–130)
GLUCOSE BLDC GLUCOMTR-MCNC: 213 MG/DL (ref 70–130)
HCT VFR BLD AUTO: 31.3 % (ref 35.6–45.5)
HGB BLD-MCNC: 9.7 G/DL (ref 11.9–15.5)
MCH RBC QN AUTO: 23.7 PG (ref 26.9–32)
MCHC RBC AUTO-ENTMCNC: 31 G/DL (ref 32.4–36.3)
MCV RBC AUTO: 76.3 FL (ref 80.5–98.2)
MYELOPEROXIDASE AB SER-ACNC: <9 U/ML (ref 0–9)
P-ANCA ATYPICAL TITR SER IF: ABNORMAL TITER
P-ANCA TITR SER IF: ABNORMAL TITER
PLATELET # BLD AUTO: 62 10*3/MM3 (ref 140–500)
PLATELET # BLD AUTO: 66 10*3/MM3 (ref 140–500)
PLATELETS.RETICULATED NFR BLD AUTO: 6.2 % (ref 0.9–6.5)
POTASSIUM BLD-SCNC: 4.1 MMOL/L (ref 3.5–5.2)
PROTEINASE3 AB SER IA-ACNC: <3.5 U/ML (ref 0–3.5)
RBC # BLD AUTO: 4.1 10*6/MM3 (ref 3.9–5.2)
REF LAB TEST METHOD: NORMAL
SODIUM BLD-SCNC: 144 MMOL/L (ref 136–145)
WBC NRBC COR # BLD: 4.11 10*3/MM3 (ref 4.5–10.7)

## 2018-01-26 PROCEDURE — 85027 COMPLETE CBC AUTOMATED: CPT | Performed by: HOSPITALIST

## 2018-01-26 PROCEDURE — 97110 THERAPEUTIC EXERCISES: CPT

## 2018-01-26 PROCEDURE — 94002 VENT MGMT INPAT INIT DAY: CPT

## 2018-01-26 PROCEDURE — 99233 SBSQ HOSP IP/OBS HIGH 50: CPT | Performed by: INTERNAL MEDICINE

## 2018-01-26 PROCEDURE — 82962 GLUCOSE BLOOD TEST: CPT

## 2018-01-26 PROCEDURE — 97162 PT EVAL MOD COMPLEX 30 MIN: CPT

## 2018-01-26 PROCEDURE — 85055 RETICULATED PLATELET ASSAY: CPT | Performed by: INTERNAL MEDICINE

## 2018-01-26 PROCEDURE — 25010000002 METHYLPREDNISOLONE: Performed by: PSYCHIATRY & NEUROLOGY

## 2018-01-26 PROCEDURE — 94799 UNLISTED PULMONARY SVC/PX: CPT

## 2018-01-26 PROCEDURE — 94003 VENT MGMT INPAT SUBQ DAY: CPT

## 2018-01-26 PROCEDURE — 99232 SBSQ HOSP IP/OBS MODERATE 35: CPT | Performed by: INTERNAL MEDICINE

## 2018-01-26 PROCEDURE — 25010000002 PIPERACILLIN SOD-TAZOBACTAM PER 1 G: Performed by: INTERNAL MEDICINE

## 2018-01-26 PROCEDURE — 80048 BASIC METABOLIC PNL TOTAL CA: CPT | Performed by: HOSPITALIST

## 2018-01-26 PROCEDURE — 25010000002 PROPOFOL 1000 MG/ML EMULSION: Performed by: INTERNAL MEDICINE

## 2018-01-26 PROCEDURE — 99223 1ST HOSP IP/OBS HIGH 75: CPT | Performed by: INTERNAL MEDICINE

## 2018-01-26 RX ORDER — LANSOPRAZOLE
30 KIT EVERY 12 HOURS SCHEDULED
Status: DISCONTINUED | OUTPATIENT
Start: 2018-01-26 | End: 2018-01-27 | Stop reason: HOSPADM

## 2018-01-26 RX ORDER — CASTOR OIL AND BALSAM, PERU 788; 87 MG/G; MG/G
OINTMENT TOPICAL EVERY 12 HOURS SCHEDULED
Status: DISCONTINUED | OUTPATIENT
Start: 2018-01-26 | End: 2018-01-27 | Stop reason: HOSPADM

## 2018-01-26 RX ADMIN — CYCLOSPORINE 1 DROP: 0.5 EMULSION OPHTHALMIC at 20:30

## 2018-01-26 RX ADMIN — PROPOFOL 45 MCG/KG/MIN: 10 INJECTION, EMULSION INTRAVENOUS at 20:07

## 2018-01-26 RX ADMIN — GABAPENTIN 200 MG: 250 SOLUTION ORAL at 00:26

## 2018-01-26 RX ADMIN — SODIUM CHLORIDE 100 ML/HR: 9 INJECTION, SOLUTION INTRAVENOUS at 22:26

## 2018-01-26 RX ADMIN — Medication 10 ML: at 08:27

## 2018-01-26 RX ADMIN — TAZOBACTAM SODIUM AND PIPERACILLIN SODIUM 3.38 G: 375; 3 INJECTION, SOLUTION INTRAVENOUS at 13:27

## 2018-01-26 RX ADMIN — AMLODIPINE BESYLATE 5 MG: 5 TABLET ORAL at 08:25

## 2018-01-26 RX ADMIN — PANTOPRAZOLE SODIUM 40 MG: 40 INJECTION, POWDER, FOR SOLUTION INTRAVENOUS at 06:32

## 2018-01-26 RX ADMIN — PROPOFOL 40 MCG/KG/MIN: 10 INJECTION, EMULSION INTRAVENOUS at 02:56

## 2018-01-26 RX ADMIN — LANSOPRAZOLE 30 MG: KIT at 20:29

## 2018-01-26 RX ADMIN — GABAPENTIN 200 MG: 250 SOLUTION ORAL at 08:25

## 2018-01-26 RX ADMIN — TAZOBACTAM SODIUM AND PIPERACILLIN SODIUM 3.38 G: 375; 3 INJECTION, SOLUTION INTRAVENOUS at 04:46

## 2018-01-26 RX ADMIN — PROPOFOL 45 MCG/KG/MIN: 10 INJECTION, EMULSION INTRAVENOUS at 09:55

## 2018-01-26 RX ADMIN — TAZOBACTAM SODIUM AND PIPERACILLIN SODIUM 3.38 G: 375; 3 INJECTION, SOLUTION INTRAVENOUS at 20:30

## 2018-01-26 RX ADMIN — CASTOR OIL AND BALSAM, PERU: 788; 87 OINTMENT TOPICAL at 20:30

## 2018-01-26 RX ADMIN — METHYLPREDNISOLONE SODIUM SUCCINATE 1 G: 1 INJECTION, POWDER, FOR SOLUTION INTRAMUSCULAR; INTRAVENOUS at 18:09

## 2018-01-26 RX ADMIN — CYCLOSPORINE 1 DROP: 0.5 EMULSION OPHTHALMIC at 08:26

## 2018-01-26 RX ADMIN — GABAPENTIN 200 MG: 250 SOLUTION ORAL at 20:28

## 2018-01-26 RX ADMIN — PROPOFOL 45 MCG/KG/MIN: 10 INJECTION, EMULSION INTRAVENOUS at 14:19

## 2018-01-26 RX ADMIN — CASTOR OIL AND BALSAM, PERU: 788; 87 OINTMENT TOPICAL at 14:53

## 2018-01-26 NOTE — DISCHARGE PLACEMENT REQUEST
"Catracho Abbott (62 y.o. Female)     Date of Birth Social Security Number Address Home Phone MRN    1955  [address] [id number] [phone] [id number]    Baptism Marital Status          Anabaptist        Admission Date Admission Type Admitting Provider Attending Provider Department, Room/Bed    1/19/18 Emergency Hugo Elam MD Beard, Lyle E, MD [facility] CORONARY CARE, 335/1    Discharge Date Discharge Disposition Discharge Destination                      Attending Provider: Elie Morales MD     Allergies:  Buffered Aspirin, Ace Inhibitors, Aurothioglucose, Latex, Keflex [Cephalexin]    Isolation:  None   Infection:  None   Code Status:  FULL    Ht:  149.9 cm (59\")   Wt:  69.7 kg (153 lb 11.2 oz)    Admission Cmt:  None   Principal Problem:  FUO (fever of unknown origin) [R50.9]                 Active Insurance as of 1/19/2018     Primary Coverage     Payor Plan Insurance Group Employer/Plan Group    MEDICARE MEDICARE A & B      Payor Plan Address Payor Plan Phone Number Effective From Effective To    [address] [phone] 10/1/1998     [place] [address]       Subscriber Name Subscriber Birth Date Member ID       CATRACHO ABBOTT 1955 [id number]                 Emergency Contacts      (Rel.) Home Phone Work Phone Mobile Phone    Prashant Abbott (Son) [phone] -- [phone]              "

## 2018-01-26 NOTE — PLAN OF CARE
Problem: Patient Care Overview (Adult)  Goal: Plan of Care Review  Outcome: Ongoing (interventions implemented as appropriate)   01/26/18 1554   Coping/Psychosocial Response Interventions   Plan Of Care Reviewed With patient   Outcome Evaluation   Outcome Summary/Follow up Plan pt was sedated on vent at time of visit, PROM performed, will follow for progression of activity as tolerated, pt may be transferring to [facility]       Problem: Inpatient Physical Therapy  Goal: Bed Mobility Goal LTG- PT  Outcome: Ongoing (interventions implemented as appropriate)   01/26/18 1554   Bed Mobility PT LTG   Bed Mobility PT LTG, Date Established 01/26/18   Bed Mobility PT LTG, Time to Achieve 1 wk   Bed Mobility PT LTG, Activity Type supine to sit/sit to supine   Bed Mobility PT LTG, Hartley Level moderate assist (50% patient effort);2 person assist required     Goal: Static Sitting Balance Goal LTG- PT  Outcome: Ongoing (interventions implemented as appropriate)   01/26/18 1554   Static Sitting Balance PT LTG   Static Sitting Balance PT LTG, Date Established 01/26/18   Static Sitting Balance PT LTG, Time to Achieve 1 wk   Static Sitting Balance PT LTG, Hartley Level minimum assist (75% patient effort)   Static Sitting Balance PT LTG, Assist Device UE Support   Static Sitting Balance PT LTG, Additional Goal 5 minutes

## 2018-01-26 NOTE — PROGRESS NOTES
Continued Stay Note  [facility]     Patient Name: Domitila Valera  MRN: [id number]  Today's Date: 1/26/2018    Admit Date: 1/19/2018          Discharge Plan       01/26/18 1215    Case Management/Social Work Plan    Additional Comments Dr. Villegas has now spoken to and has an accepting MD at [facility].  Call placed to the Transfer Center to check the status of the transfer.  Sienna stated that they are waiting on a medical ICU bed to open.  After the bed is assigned, they will arrange the critical care transport team.  Await bed assignment.      01/26/18 1006    Case Management/Social Work Plan    Plan [facility]    Additional Comments Received request from Dr. Villegas to begin transfer to [facility].  Call to the Transfer Center at [phone] and spoke with Sienna.  She took demographics and contact information for Dr. Villgeas.  The intensivist will call Dr. Villegas to discuss.  Face sheet efaxed to Sienna at [phone].              Discharge Codes     None            Emely Franks RN

## 2018-01-26 NOTE — CONSULTS
[facility] GROUP INITIAL INPATIENT CONSULTATION NOTE    REASON FOR CONSULTATION:  Thrombocytopenia     RECORDS OBTAINED: Records of the patients history including those obtained from the referring provider were reviewed and summarized in detail.    HISTORY OF PRESENT ILLNESS:  Domitila Valera is a 62 y.o. female who we are asked to see today in consultation for thrombocytopenia.     Patient follows with Dr Ho at [facility] for anemia of both chronic disease and iron deficiency. He's been prescribed oral iron bid. She was admitted to [facility] in December with colitis - either ischemic or ulcerative per Dr Ho's note.  She also has a diagnosis of RA treated by Dr Lewis with Xeljanz which was recently stopped due to infection. She was readmitted to [facility] in early January with fatigue, malaise, edema.  She also had fever of unknown origin and was ultimately treated with Diflucan. On 1/9/18 she had a bone marrow biopsy that showed mildly hypercellular bone marrow with trilineage hematopoiesis and mild reticulin fibrosis with adequate iron stores. Chromosome analysis and flow were normal.    Patient presented to [facility] on 1/19/18 with fever, fatigue and right ankle pain. In the ED she was found to have JEFFERY, hyponatremia and hyperkalemia. Ultimately ID was consulted for fever of unknown origin. Patient became increasingly confused and then hypoxic. She's been on antibiotic therapy but infectious workup has been unrevealing. An MRI brain showed findings concerning for encephalitis. She had a lumbar puncture that was unrevealing.    On 1/25/18 she had progressive encephalopathy with concern for compromised airway and was intubated. She was started on high dose Solumedrol on 1/25. She has multiple significantly elevated inflammatory markers.  She has been accepted at [facility] however they are awaiting a bed.    We've been consulted for her thrombocytopenia. Again, upon reviewing her records, in  January 2018 patient had leuokopenia and anemia with white blood cell count as low as 1.36 and hemoglobin down to 7.1. Her platelets were normal during that admission. Bone marrow biopsy details as above. Her cbc on admission to [facility] showed normal wbc and platelets, but her platelets slowly declined and were 69 on 1/24/18. She was transfused for LP and then platelets have declined again down to 66.     Most of the history is obtained from the review of the chart, the patient's sister and the patient's son.  Patient does not have a history of thrombocytopenia but does have abnormality on account as detailed above. Patient is intubated without obvious bleeding.      Past Medical   Past Medical History:   Diagnosis Date   • Hypertension    • Ischemic colitis    • Rheumatoid arthritis    • Thyroid nodule      Social History   Social History     Social History   • Marital status:      Spouse name: N/A   • Number of children: N/A   • Years of education: N/A     Occupational History   • Not on file.     Social History Main Topics   • Smoking status: Never Smoker   • Smokeless tobacco: Not on file   • Alcohol use No   • Drug use: Defer   • Sexual activity: Defer     Other Topics Concern   • Not on file     Social History Narrative   • No narrative on file     Family History  History reviewed. No pertinent family history.  Medications    Current Facility-Administered Medications:   •  acetaminophen (TYLENOL) suppository 650 mg, 650 mg, Rectal, Q4H PRN, Raad Adams MD, 650 mg at 01/23/18 2123  •  acetaminophen (TYLENOL) tablet 650 mg, 650 mg, Oral, Q4H PRN **OR** acetaminophen (TYLENOL) suppository 650 mg, 650 mg, Rectal, Q4H PRN, RAMANDEEP Pedraza, 650 mg at 01/24/18 1857  •  acetaminophen (TYLENOL) tablet 650 mg, 650 mg, Oral, Q6H PRN, Hugo Elam MD, 650 mg at 01/22/18 2051  •  amLODIPine (NORVASC) tablet 5 mg, 5 mg, Oral, Q24H, Elie Morales MD, 5 mg at 01/26/18 0825  •  cycloSPORINE (RESTASIS)  0.05 % ophthalmic emulsion 1 drop, 1 drop, Both Eyes, BID, Hugo Elam MD, 1 drop at 01/26/18 0826  •  dextrose (D50W) solution 50 mL, 50 mL, Intravenous, Q1H PRN, Vinayak Feliciano MD, 50 mL at 01/20/18 0003  •  diltiaZEM (CARDIZEM) 100 mg/100 mL (1 mg/mL) 0.9% NS, 5-15 mg/hr, Intravenous, Continuous, Sarbjit Manzanares MD, Last Rate: 5 mL/hr at 01/25/18 1433, 5 mg/hr at 01/25/18 1433  •  fentaNYL citrate (PF) (SUBLIMAZE) injection 25 mcg, 25 mcg, Intravenous, Q1H PRN, Claudette Villegas MD, 25 mcg at 01/25/18 2349  •  gabapentin (NEURONTIN) 250 MG/5ML solution 200 mg, 200 mg, Oral, Q12H, Rusty Toussaint MD, 200 mg at 01/26/18 0825  •  Hold medication, 1 each, Does not apply, Continuous PRN, Enmanuel Welch MD  •  hydrALAZINE (APRESOLINE) injection 10 mg, 10 mg, Intravenous, Q6H PRN, Elie Morales MD  •  HYDROcodone-acetaminophen (NORCO) 5-325 MG per tablet 1 tablet, 1 tablet, Oral, Q6H PRN, Hugo Elam MD, 1 tablet at 01/22/18 0625  •  methylPREDNISolone sod succ 1 g/100 mL 0.9% NS VTB (mbp), 1,000 mg, Intravenous, Q24H, Enmanuel Welch MD, 1 g at 01/25/18 1824  •  metoprolol tartrate (LOPRESSOR) injection 5 mg, 5 mg, Intravenous, Q6H PRN, Elie Morales MD, 5 mg at 01/25/18 1231  •  ondansetron (ZOFRAN) injection 4 mg, 4 mg, Intravenous, Q6H PRN, Tricia Albarran APRN  •  pantoprazole (PROTONIX) injection 40 mg, 40 mg, Intravenous, BID AC, Elie Morales MD, 40 mg at 01/26/18 0632  •  piperacillin-tazobactam (ZOSYN) 3.375 g in iso-osmotic dextrose 50 ml (premix), 3.375 g, Intravenous, Q8H, Dylon Andrews MD, 3.375 g at 01/26/18 0446  •  potassium chloride (MICRO-K) CR capsule 40 mEq, 40 mEq, Oral, PRN **OR** potassium chloride (KLOR-CON) packet 40 mEq, 40 mEq, Oral, PRN **OR** potassium chloride 10 mEq in 100 mL IVPB, 10 mEq, Intravenous, Q1H PRN, Elie Morales MD, Last Rate: 100 mL/hr at 01/25/18 1415, 10 mEq at 01/25/18 1415  •  potassium chloride (MICRO-K) CR capsule 40 mEq, 40 mEq,  Oral, Once, Kip Roberson MD  •  propofol (DIPRIVAN) infusion 10 mg/mL 100 mL, 5-50 mcg/kg/min, Intravenous, Titrated, Claudette Villegas MD, Last Rate: 14.64 mL/hr at 01/26/18 0523, 35 mcg/kg/min at 01/26/18 0523  •  sodium chloride 0.9 % flush 1-10 mL, 1-10 mL, Intravenous, PRN, RAMANDEEP Pedraza  •  sodium chloride 0.9 % flush 10 mL, 10 mL, Intracatheter, Q12H, Raad Adams MD, 10 mL at 01/26/18 0827  •  sodium chloride 0.9 % flush 10 mL, 10 mL, Intracatheter, PRN, Raad Adams MD  •  sodium chloride 0.9 % infusion, 100 mL/hr, Intravenous, Continuous, Kip Roberson MD, Last Rate: 75 mL/hr at 01/24/18 2230, 75 mL/hr at 01/24/18 2230  •  temazepam (RESTORIL) capsule 15 mg, 15 mg, Oral, Nightly PRN, Hugo Elam MD, 15 mg at 01/22/18 2051  Allergies  Allergies   Allergen Reactions   • Buffered Aspirin Rash and Shortness Of Breath   • Ace Inhibitors      cough   • Aurothioglucose Diarrhea     Increased AST & ALT   • Latex Itching   • Keflex [Cephalexin] Hives and Rash     Tolerated cephalosporins 1/2018     Review of Systems  Fourteen point review of systems performed.  Positive pertinents identified above in history of presenting illness; all remaining systems negative.       Objective:     Vitals:    01/26/18 0835   BP: 145/91   Pulse: 80   Resp:    Temp:    SpO2: 100%     GENERAL:  Female, sedated on vent, in no acute distress.   SKIN:  Warm, dry without rashes, purpura or petechiae.  HEAD:  Normocephalic.  EYES:  Pupils equal, round and reactive to light.  EOMs intact.  Conjunctivae normal.  EARS:  Hearing intact.  NOSE:  Septum midline.  No excoriations or nasal discharge.  MOUTH:  ETT in place; Lips normal.  THROAT:  ETT in place  NECK:  Supple with good range of motion; no thyromegaly or masses  LYMPHATICS:  No cervical, supraclavicular adenopathy.  CHEST:  Lungs clear to percussion and auscultation. Good airflow.  CARDIAC:  Regular rate and rhythm without murmurs, rubs or gallops.  Normal S1,S2.  ABDOMEN:  Soft, nontender, no hepatosplenomegaly, normal bowel sounds  EXTREMITIES:  No clubbing, cyanosis or edema.  NEUROLOGICAL:  Sedated on vent  PSYCHIATRIC: sedated        DIAGNOSTIC DATA:    Results from last 7 days  Lab Units 01/26/18  0507 01/25/18  0551 01/24/18  0249  01/19/18  1457   WBC 10*3/mm3 4.11* 3.31* 2.75*  < > 6.07   HEMOGLOBIN g/dL 9.7* 9.1* 7.3*  < > 8.1*   HEMATOCRIT % 31.3* 29.3* 24.3*  < > 26.9*   PLATELETS 10*3/mm3 66* 94* 69*  < > 150   MONOCYTES % %  --   --   --   --  12.0   < > = values in this interval not displayed.    Results from last 7 days  Lab Units 01/26/18  0507 01/25/18  0551 01/24/18  0249 01/23/18  0542 01/22/18  0455   SODIUM mmol/L 144 145 143 137 134*   POTASSIUM mmol/L 4.1 3.1* 3.2* 3.4* 3.7   CHLORIDE mmol/L 111* 115* 113* 105 98   CO2 mmol/L 17.5* 19.6* 18.3* 20.6* 21.7*   BUN mg/dL 43* 30* 28* 26* 32*   CREATININE mg/dL 1.53* 1.09* 1.04* 1.04* 1.13*   CALCIUM mg/dL 8.4* 7.3* 6.6* 6.8* 7.7*   BILIRUBIN mg/dL  --  0.4  --  0.4 0.4   ALK PHOS U/L  --  277*  --  268* 346*   ALT (SGPT) U/L  --  14  --  14 19   AST (SGOT) U/L  --  55*  --  40* 56*   GLUCOSE mg/dL 152* 103* 80 73 78       Cortisol 7  Procal 1-->0.9  Urine Histo Ag negative  HIV negative  Hep C negative  RPR non-reactive  ABI pending  ANCA pending  RF 42  Ferritin 1812     LP Results:  11 WBCs (86% lymphs)  1640 RBCs  Pro 48  Glc 45  Gram stain and culture negative  HSV 1/2 PCR pending     Microbiology:  1/19 BCx: negative  1/20 RVP: negative  1/21 SpCx: negative  1/21 CSF Cx: negative    IMAGING:  Multiple imaging studies reviewed.       Assessment/Plan   Assessment/Plan:   This is a 62 y.o. female with:    1. Pancytopenia    - BM 1/9/18 at [facility] that showed mildly hypercellular bone marrow with trilineage hematopoiesis and mild reticulin fibrosis with adequate iron stores. Chromosome analysis and flow were normal. Could be related to underlying RA/treatment.    - Suspect acute  thrombocytopenia  related to significant inflammation and infection. She's already on steroids for her encephalitis.    - Monitor and transfuse as needed for platelets <10,000 or <20,000 with bleeding or fever.     2. Lymphadenopathy - right axilla, left subpectoral  region, retroperitoneum, right inguinal region.    - Unclear etiology. Could be reactive. Monitor for now but may consider additional evaluation once more stable. Flow cyto from outside hospital was negative.     3. Multiple liver lesions. Reviewed MRI of abdomen. Needs repeat in 3 months    4. Encephalitis.  Unclear etiology.  She is being transferred to [facility] when a bed is available.  She's on high-dose steroids for possible vasculitis.   - Intubated for airway protection.    5. DVT ppx - hold for procedures or platelets <31309. Off now due to recent LP.     Case discussed with patient's sister and son. Expect transfer to [facility] once bed available.            Michelle Piper MD

## 2018-01-26 NOTE — DISCHARGE SUMMARY
PHYSICIAN DISCHARGE SUMMARY                                                                        [facility]    Patient Identification:  Name: Domitila Valera  Age: 62 y.o.  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      Patient Active Problem List   Diagnosis Code   • FUO (fever of unknown origin) R50.9   • Hyperkalemia E87.5   • Benign essential HTN I10   • Cellulitis of right upper arm L03.113   • Hyponatremia E87.1   • EJFFERY (acute kidney injury) N17.9   • Anemia D64.9   • GI bleed K92.2   • Cellulitis L03.90   • Thrombocytopenia D69.6   • Paroxysmal atrial fibrillation I48.0   • PNA (pneumonia) J18.9   • 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   • Encephalopathy G93.40   • Positive ABI (antinuclear antibody) R76.8   • Rheumatoid arthritis M06.9       PMHX:   Past Medical History:   Diagnosis Date   • Hypertension    • Ischemic colitis    • Rheumatoid arthritis    • Thyroid nodule      PSHX:   Past  Surgical History:   Procedure Laterality Date   • ANKLE SURGERY Right    •  SECTION     • HYSTERECTOMY     • JOINT REPLACEMENT Bilateral     knee       Hospital Course: Domitila Valera  is a 62 y.o. female has a history of Rheumatoid arthritis will normally sees Dr. Bro Lewis  was on immunosuppressive therapy with Xeljanz, recently stopped after infection.  She has been to [facility] multiple times and around [place] she tells me that she was diagnosed with ischemic colitis and some liver nodules.  The MRI showed these nodules could be hemangiomas.  She did had bloody stools had developed anemia.  Now those symptoms have resolved.  The patient had been discharged from [facility] on  and plan was to treat with Diflucan for 30 days for possible candidal infection but reviewing cultures there only Candida grew in urine only but not blood.   Today she presented with a history of having fever fatigue and pain in the right ankle.  The patient had been having pain in both ankles and there was some redness in both lower extremities with initial concern for some cellulitis.   She denies any nausea vomiting cough sputum production.  She did had fever up to 101.2 this morning.  She was not happy at [facility] and since she presented to [facility] ER.        Patient was admitted hospital and seen by infectious disease and treated with broad-spectrum antibiotics and with her encephalopathy also initially acyclovir.  Her mental status worsened and neurology saw the patient and evaluation with LP which was negative for meningitis.  There was concern the patient may have a vasculitis aggravating her encephalopathy.  She did have positive c-ANCA and was started on high-dose steroids.  The patient had deterioration with episode of rapid A. fib and hypoxia requiring intubation and transfer to ICU.  The patient did convert back to sinus rhythm with Cardizem drip and was stabilized in the ICU on ventilator with  40% O2 5 of PEEP.  Family was requesting transfer to a Medical Center with higher level of care.  Arrangements are underway for patient to be transferred to [facility] via air ambulance for further evaluation treatment of her condition.  The concern is she may have vasculitis and may require cerebral angiogram and/or possibly brain biopsy to evaluate further with her deteriorating mental status.  The patient also had some degree of aspiration pneumonia and after herpes PCR was negative and no other cultures growing anything in meningitis PCR being negative antibiotics were tapered back to Zosyn and acyclovir was discontinued.  Hopefully bed will become available clinic [facility] later today and patient will transfer by air ambulance to ICU for continuing care and further evaluation.      Consults:     Consults     Date and Time Order Name Status Description    1/26/2018 0819 Inpatient Consult to Hematology & Oncology Completed     1/24/2018 0026 Inpatient Consult to Neurosurgery      1/23/2018 1113 Inpatient Consult to Rheumatology      1/23/2018 1041 Inpatient Consult to Neurology Completed     1/22/2018 2251 Inpatient Consult to Cardiology Completed     1/21/2018 1316 Inpatient Consult to Gastroenterology Completed     1/19/2018 2212 Inpatient Consult to Infectious Diseases Completed     1/19/2018 2212 Inpatient Consult to Nephrology      1/19/2018 1947 [facility] (on-call MD unless specified) Completed           Results from last 7 days  Lab Units 01/26/18  1119 01/26/18  0507   WBC 10*3/mm3  --  4.11*   HEMOGLOBIN g/dL  --  9.7*   HEMATOCRIT %  --  31.3*   PLATELETS 10*3/mm3 62* 66*       Results from last 7 days  Lab Units 01/26/18  0507   SODIUM mmol/L 144   POTASSIUM mmol/L 4.1   CHLORIDE mmol/L 111*   CO2 mmol/L 17.5*   BUN mg/dL 43*   CREATININE mg/dL 1.53*   GLUCOSE mg/dL 152*   CALCIUM mg/dL 8.4*     Significant Diagnostic Studies:   Lab Results   Component Value Date    WBC 4.11 (L)  01/26/2018    HGB 9.7 (L) 01/26/2018    HCT 31.3 (L) 01/26/2018    PLT 62 (L) 01/26/2018     Lab Results   Component Value Date     01/26/2018    K 4.1 01/26/2018     (H) 01/26/2018    CO2 17.5 (L) 01/26/2018    BUN 43 (H) 01/26/2018    CREATININE 1.53 (H) 01/26/2018    GLUCOSE 152 (H) 01/26/2018     Lab Results   Component Value Date    CALCIUM 8.4 (L) 01/26/2018    MG 1.9 01/25/2018     Lab Results   Component Value Date    AST 55 (H) 01/25/2018    ALT 14 01/25/2018    ALKPHOS 277 (H) 01/25/2018     No results found for: APTT, INR  No results found for: COLORU, CLARITYU, SPECGRAV, PHUR, PROTEINUR, GLUCOSEU, KETONESU, BLOODU, NITRITE, LEUKOCYTESUR, BILIRUBINUR, UROBILINOGEN, RBCUA, WBCUA, BACTERIA  No results found for: TROPONINT, TROPONINI, BNP  No components found for: HGBA1C;2  No components found for: TSH;2  Imaging Results (all)     Procedure Component Value Units Date/Time    US Renal Bilateral [[id number]] Collected:  01/20/18 0823     Updated:  01/20/18 0827    Narrative:       US RENAL BILATERAL-     INDICATIONS: Acute kidney injury     TECHNIQUE: ULTRASOUND OF THE KIDNEYS AND URINARY BLADDER.     COMPARISON: None available     FINDINGS:     The right kidney measures 10.0 centimeters, the left kidney measures 9.6  centimeters.     No renal lesion is identified. A few echogenic foci are apparent in the  right kidney, as large as 3 mm, suggesting small nonobstructive stones.  No hydronephrosis or echogenic left nephrolithiasis.     No ureteral jets were observed during the exam. The urinary bladder  otherwise appears unremarkable.       Impression:       No hydronephrosis.     This report was finalized on 1/20/2018 8:24 AM by Dr. Mt Torres MD.       CT Abdomen Pelvis With Contrast [[id number]] Collected:  01/20/18 1649     Updated:  01/20/18 1837    Narrative:       CT CHEST ABDOMEN AND PELVIS WITH CONTRAST     HISTORY: Acute renal failure, hyponatremia, malaise, weakness.     TECHNIQUE:  CT chest, abdomen and pelvis with IV and oral contrast.     COMPARISON: There are no previous CTs for comparison.     FINDINGS  CHEST: There are small mediastinal nodes without evidence for  mediastinal or hilar ramya enlargement. Shotty bilateral axillary nodes  are present. There is a 1.1 cm right axillary node. There is also a  mildly enlarged left subpectoral node measuring 1.5 cm.     Small pleural effusions layer dependently and there is left and right  lower lobe atelectasis. Within the lingula and anterior/inferior lateral  left upper lobe there is peripheral airspace disease and ground glass  opacity that is consistent with infiltrate in the proper clinical  setting. Right lung appears clear with the exception of right basilar  atelectasis. Heart size is enlarged. There is advanced bilateral  shoulder osteoarthritis. Mild scoliotic curvature is present of the  thoracic spine.     ABDOMEN/PELVIS:  There is diffuse fat infiltration of the liver. Dense  material layers dependently within the gallbladder fundus due to sludge  or small stones or vicarious excretion of contrast. Splenic size is  within normal limits. Adrenal glands, pancreas, and right kidney appear  within normal limits. There is a 9 mm left mid to lower pole low-density  renal lesion that is difficult to characterize due to its small size,  though it is most likely a cyst. There are multiple retroperitoneal  lymph nodes with borderline enlargement. A left periaortic node measures  1.2 cm. There is no bowel dilatation or evidence for bowel obstruction.  There is no evidence for appendicitis. Oral contrast extends to the  rectum.  There is no bowel dilatation or evidence for obstruction. There  are bilateral inguinal nodes which are borderline enlarged. A right  inguinal node measures 1.6 x 0.9 cm.       Impression:       1. Small bilateral pleural effusions with lower lobe atelectasis. Within  the lingula and anterior inferior lateral left upper  lobe there is  peripheral area of consolidation and ground glass opacity consistent  with infiltrate in the proper clinical setting.  2. Cardiomegaly.  3. Borderline enlarged nodes within the right axilla, left subpectoral  region, retroperitoneum, right inguinal region. These may represent  reactive nodes though are indeterminate with this exam. There are no  previous studies for comparison.  4. Dense material within the gallbladder fundus due to sludge, vicarious  excretion of contrast, or small stones.  5. Sigmoid diverticulosis without evidence for diverticulitis.  6. Advanced bilateral shoulder osteoarthritis.      Radiation dose reduction techniques were utilized, including automated  exposure control and exposure modulation based on body size.     This report was finalized on 1/20/2018 6:34 PM by Dr. Blake Graham MD.       CT Chest With Contrast [[id number]] Collected:  01/20/18 1649     Updated:  01/20/18 1837    Narrative:       CT CHEST ABDOMEN AND PELVIS WITH CONTRAST     HISTORY: Acute renal failure, hyponatremia, malaise, weakness.     TECHNIQUE: CT chest, abdomen and pelvis with IV and oral contrast.     COMPARISON: There are no previous CTs for comparison.     FINDINGS  CHEST: There are small mediastinal nodes without evidence for  mediastinal or hilar ramya enlargement. Shotty bilateral axillary nodes  are present. There is a 1.1 cm right axillary node. There is also a  mildly enlarged left subpectoral node measuring 1.5 cm.     Small pleural effusions layer dependently and there is left and right  lower lobe atelectasis. Within the lingula and anterior/inferior lateral  left upper lobe there is peripheral airspace disease and ground glass  opacity that is consistent with infiltrate in the proper clinical  setting. Right lung appears clear with the exception of right basilar  atelectasis. Heart size is enlarged. There is advanced bilateral  shoulder osteoarthritis. Mild scoliotic curvature is  present of the  thoracic spine.     ABDOMEN/PELVIS:  There is diffuse fat infiltration of the liver. Dense  material layers dependently within the gallbladder fundus due to sludge  or small stones or vicarious excretion of contrast. Splenic size is  within normal limits. Adrenal glands, pancreas, and right kidney appear  within normal limits. There is a 9 mm left mid to lower pole low-density  renal lesion that is difficult to characterize due to its small size,  though it is most likely a cyst. There are multiple retroperitoneal  lymph nodes with borderline enlargement. A left periaortic node measures  1.2 cm. There is no bowel dilatation or evidence for bowel obstruction.  There is no evidence for appendicitis. Oral contrast extends to the  rectum.  There is no bowel dilatation or evidence for obstruction. There  are bilateral inguinal nodes which are borderline enlarged. A right  inguinal node measures 1.6 x 0.9 cm.       Impression:       1. Small bilateral pleural effusions with lower lobe atelectasis. Within  the lingula and anterior inferior lateral left upper lobe there is  peripheral area of consolidation and ground glass opacity consistent  with infiltrate in the proper clinical setting.  2. Cardiomegaly.  3. Borderline enlarged nodes within the right axilla, left subpectoral  region, retroperitoneum, right inguinal region. These may represent  reactive nodes though are indeterminate with this exam. There are no  previous studies for comparison.  4. Dense material within the gallbladder fundus due to sludge, vicarious  excretion of contrast, or small stones.  5. Sigmoid diverticulosis without evidence for diverticulitis.  6. Advanced bilateral shoulder osteoarthritis.      Radiation dose reduction techniques were utilized, including automated  exposure control and exposure modulation based on body size.     This report was finalized on 1/20/2018 6:34 PM by Dr. Blake Graham MD.       XR Ankle 2 View  Bilateral [[id number]] Collected:  01/20/18 1514     Updated:  01/20/18 1837    Narrative:       BOTH ANKLES: AP AND LATERAL VIEWS OF EACH     HISTORY: Pain and swelling for 2 weeks in both ankles.     COMPARISON: None.     RIGHT ANKLE: There is advanced tibiotalar and subtalar arthritis with  joint space loss and exuberant marginal osteophyte formation. Chronic  osteochondral bodies project anterior to the tibiotalar joint and extend  above the talar head where there is spur formation. Generalized soft  tissue swelling is present about the ankle and there are vascular  calcifications. Moderate-to-severe osteoarthritis is present of the  talonavicular joint. There is no evidence for fracture.     LEFT ANKLE: Tibiotalar joint space appears within normal limits. There  are arthritic changes of the subtalar joints and at the talonavicular  navicular cuneiform joints, where there is joint space narrowing and  spur formation. Generalized soft tissue swelling is present about the  ankle. There is no evidence for fracture or acute osseous abnormality.  Atherosclerotic calcifications are evident.     This report was finalized on 1/20/2018 6:34 PM by Dr. Blake Graham MD.       IR Lumbar Puncture Diagnosis [[id number]] Collected:  01/21/18 1625     Updated:  01/21/18 1631    Narrative:       IR LUMBAR PUNCTURE DIAGNOSIS-     INDICATIONS: Fever     FINDINGS:     Following detailed discussion with the patient of the risks, benefits,  alternatives of the procedure, verbal and written informed consent was  obtained from the patient's son, this patient was unable to provide  consent.     Patient was placed in a prone position on the fluoroscopy table. A final  timeout was performed verifying patient identity and procedure.     A location was chosen at the L3/4 level, the overlying skin was cleaned  and anesthetized, and a 20-gauge needle was advanced into the thecal  sac, revealing clear CSF. The fluid was extremely  slow-flowing despite  repositioning the needle and the patient, and only approximately 2 cc  could be obtained. Opening pressure could not be assessed. The fluid was  sent to the laboratory for further evaluation. Needle was removed  intact.     Fluoroscopy time: 20 seconds, 4 images       Impression:          Lumbar puncture, as described. Collected fluid was sent to the  laboratory for further evaluation.      This report was finalized on 1/21/2018 4:28 PM by Dr. Mt Torres MD.       MRI Brain With & Without Contrast [[id number]] Collected:  01/22/18 1942     Updated:  01/22/18 2055    Narrative:       MR SCAN OF THE BRAIN WITHOUT AND WITH INTRAVENOUS CONTRAST     HISTORY: Confusion. Generalized weakness.     TECHNIQUE: The MR scan was performed with sagittal, axial, and coronal  images and includes T1 images without and with intravenous contrast.      FINDINGS: There is mild diffuse atrophy and some minimal chronic small  vessel ischemic change. The diffusion sequence demonstrates a tiny focus  of increased signal in the right vertex in the posterior right frontal  region measuring 2 or 3 mm. This is suspicious for a tiny acute infarct.  There is no associated hemorrhage or abnormal enhancement or mass  effect. The remainder of the MR scan is unremarkable except for some  mild mucosal thickening scattered in the ethmoid air cells.     CONCLUSION: Mild diffuse atrophy and chronic small vessel ischemic  change. Probable tiny acute infarct in the right vertex in the posterior  right frontal region measuring 2 or 3 mm as seen on the diffusion  sequence.     This report was finalized on 1/22/2018 8:52 PM by Dr. Willie Newton MD.       CT Angiogram Head With & Without Contrast [[id number]] Collected:  01/24/18 0818     Updated:  01/24/18 1108    Narrative:       CONTRAST-ENHANCED CT ANGIOGRAM OF THE HEAD AND NECK -  01/24/2018     HISTORY: Subarachnoid hemorrhage, evaluate for cerebral aneurysm  and  vasospasm.     TECHNIQUE: Spiral CT images were obtained from the base of the skull to  the vertex both pre and post intravenous contrast, and images were  reformatted and submitted in 3 mm thick axial CT section with brain  algorithm, and additional spiral CT images were obtained from the top of  the aortic arch up through the great vessels in the head and neck during  the arterial phase of contrast, and images were reformatted and  submitted in 1 mm thick axial CT section, 1 mm thick sagittal and  coronal reconstructions and 3-D reconstructions were performed to  complete the CT angiogram of the head and neck.     There are no prior CTs of the head for comparison. This is correlated to  prior MRI of the brain on 01/22/2018 as well as MRA of the head on last  evening 01/23/2018 at 9:50 PM.     FINDINGS: There is a questionable very subtle 2 cm area of low-density  in the inferior lateral right frontal cortex and juxtacortical white  matter.  This is an equivocal finding seen on precontrast axial CT  images 30 through 33.  The remainder of the brain parenchyma is normal  in attenuation. The ventricles are normal in size. I see no mass effect  and no midline shift and no extra-axial fluid collections are  identified. There is no evidence of acute intracranial hemorrhage. No  abnormal areas of enhancement are seen in the head.  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 The oral  pharynx, hypopharynx, true cords and subglottic airway are normal in  appearance; there is heterogeneous enhancement in the thyroid gland. The  lung apices are clear, there is small bilateral posterior layering  effusions and there is discoid area of low density projecting over the  superior aspect of the left major fissure that could be loculated fluid  in left major fissure. The parotid, ,  parapharyngeal,  submandibular spaces are symmetric and are normal in appearance. There  is an abnormal appearance to the C1-2 level with marked widening of the  atlantodental interval measuring 7 mm, and there appears to be bony  bridging from the posterior aspect of the anterior ring of C1 and the  anterior cortex of the odontoid and also some bony fusion across the  lateral masses of C1 and C2 bilaterally, and I suspect that this is  related to old trauma and as a result there is moderate narrowing of the  anterior posterior diameter of the canal at the C1 ring level. There is  disc space narrowing, degenerative endplate changes at C5-6, posterior  endplate spurring, mild canal narrowing and left uncovertebral joint  hypertrophy, moderate left bony foraminal narrowing at C5-6. There are  prominent osteoarthritic changes in the left glenohumeral joint with  joint space narrowing, moderate marginal osteophytic spurring and there  is extensive fatty marrow replacement in the visualized proximal left  humerus and a coarse area of calcification in the proximal left humeral  metaphysis and humeral neck in the medullary cavity that measures 17 mm  in size, could be calcification, old bone infarct or an enchondroma.     CT angiogram images demonstrate common origin of left common carotid  artery and brachiocephalic artery off the aortic arch constituting a  bovine configuration to the aortic arch which is a normal anatomic  variation. The left subclavian artery origin is normal in appearance, no  stenosis is seen in left subclavian artery. The left vertebral artery  origin is normal in appearance, no stenosis is seen in the left  vertebral artery.  The left common carotid origin is normal in  appearance.  No stenosis is seen in the left common carotid artery, its  bifurcation on the left,  There is coarsely calcified atherosclerotic  plaque involving the inferior wall of the left common carotid  bifurcation, but there is  no stenosis in the left common carotid  bifurcation.  There is no stenosis in the left internal carotid artery  using the NASCET criteria. Brachycephalic artery origin is normal in  appearance. No stenosis is seen in the brachycephalic artery, its  bifurcation into the right subclavian and common carotid artery is  normal in appearance, no stenosis is seen in the right subclavian  artery. The right vertebral artery origin is normal in appearance, no  stenosis is seen in the right vertebral artery from its origin to the  vertebrobasilar junction. The right common carotid origin is normal in  appearance, no stenosis is seen in the right common carotid artery, its  bifurcation into the right internal and external carotid arteries is  within normal limits and no stenosis is seen in the right internal  carotid artery using the NASCET criteria.     CT angiogram images through the head demonstrate a normal appearance to  the visualized intracranial segments of the distal vertebral arteries  and normal appearance to the basilar artery and the basilar tip as well  as the posterior cerebral and superior cerebellar arteries bilaterally.  The upper cervical, petrous, cavernous and supracavernous segments of  the internal carotid arteries are within normal limits. There is an  atretic A1 segment of the right anterior cerebral artery which is a  normal anatomic variation.  The dominant left A1 segment plaque supplies  both A2 segments via a normal-appearing anterior communicating artery.  The M1 segments and middle cerebral arteries and the middle cerebral  artery trifurcations are within normal limits.       Impression:       1. The findings in the cervical spine are similar to prior cervical  spine MRI [facility] on 07/12/2013. There is  marked widening of the atlantodental interval up to 7 mm with bony  bridging between the posterior cortex in the anterior ring C1 and the  anterior aspect of the  odontoid, and there is some bony bridging and  fusion across the right left lateral masses of C1 and C2 and due to the  anterior translation of C1 on C2 there is moderate narrowing of the  anterior posterior diameter of the canal at the C1 ring level. This is  likely an old posttraumatic deformity and correlate with clinical  history.  Cervical spondylosis is present with posterior spurring and  uncovertebral joint hypertrophy contributing to mild canal and moderate  left foraminal narrowing at C5-6 which could affect the exiting left C6  nerve root  2. There is questionable very subtle faint 2 cm area of low-density in  the inferior lateral right frontal cortex corresponding to an area of  FLAIR hyperintensity on the MRA of the head 5 1/2 hours ago, last night,  01/23/2018 at 9:51 PM. The FLAIR images on the MRA of the head also  demonstrated FLAIR high signal in the right frontal sulci and lateral  left temporal occipital sulci. The etiology of the FLAIR hyperintensity  on the MRA 5 1/2 hours ago is uncertain, and I recommend an MRI of the  brain with and without contrast to further evaluate and it can be  correlated to recent MRI of the head 01/22/2018.  I also recommend  correlation with recent lumbar puncture results.  3. There are small bilateral posterior layering effusions and some  loculated fluid in the superior aspect of the left major fissure, right  subclavian line is in place and its tip is not evaluated on this exam.  4. CT angiogram of the head and neck is otherwise within normal limits  with no stenosis seen in the great vessels in the head or neck and no  intracranial aneurysm identified.     The results of this and recommendations from this study were  communicated to Dr. Endy Welch by telephone on 01/24/2018 at 7:30 AM.     Radiation dose reduction techniques were utilized, including automated  exposure control and exposure modulation based on body size.     This report was finalized on 1/24/2018  11:05 AM by Dr. Javier Rodrigues MD.       CT Angiogram Neck With & Without Contrast [[id number]] Collected:  01/24/18 0818     Updated:  01/24/18 1108    Narrative:       CONTRAST-ENHANCED CT ANGIOGRAM OF THE HEAD AND NECK -  01/24/2018     HISTORY: Subarachnoid hemorrhage, evaluate for cerebral aneurysm and  vasospasm.     TECHNIQUE: Spiral CT images were obtained from the base of the skull to  the vertex both pre and post intravenous contrast, and images were  reformatted and submitted in 3 mm thick axial CT section with brain  algorithm, and additional spiral CT images were obtained from the top of  the aortic arch up through the great vessels in the head and neck during  the arterial phase of contrast, and images were reformatted and  submitted in 1 mm thick axial CT section, 1 mm thick sagittal and  coronal reconstructions and 3-D reconstructions were performed to  complete the CT angiogram of the head and neck.     There are no prior CTs of the head for comparison. This is correlated to  prior MRI of the brain on 01/22/2018 as well as MRA of the head on last  evening 01/23/2018 at 9:50 PM.     FINDINGS: There is a questionable very subtle 2 cm area of low-density  in the inferior lateral right frontal cortex and juxtacortical white  matter.  This is an equivocal finding seen on precontrast axial CT  images 30 through 33.  The remainder of the brain parenchyma is normal  in attenuation. The ventricles are normal in size. I see no mass effect  and no midline shift and no extra-axial fluid collections are  identified. There is no evidence of acute intracranial hemorrhage. No  abnormal areas of enhancement are seen in the head.  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 The oral  pharynx, hypopharynx, true cords and subglottic airway are normal in  appearance; there is heterogeneous enhancement in the thyroid  gland. The  lung apices are clear, there is small bilateral posterior layering  effusions and there is discoid area of low density projecting over the  superior aspect of the left major fissure that could be loculated fluid  in left major fissure. The parotid, , parapharyngeal,  submandibular spaces are symmetric and are normal in appearance. There  is an abnormal appearance to the C1-2 level with marked widening of the  atlantodental interval measuring 7 mm, and there appears to be bony  bridging from the posterior aspect of the anterior ring of C1 and the  anterior cortex of the odontoid and also some bony fusion across the  lateral masses of C1 and C2 bilaterally, and I suspect that this is  related to old trauma and as a result there is moderate narrowing of the  anterior posterior diameter of the canal at the C1 ring level. There is  disc space narrowing, degenerative endplate changes at C5-6, posterior  endplate spurring, mild canal narrowing and left uncovertebral joint  hypertrophy, moderate left bony foraminal narrowing at C5-6. There are  prominent osteoarthritic changes in the left glenohumeral joint with  joint space narrowing, moderate marginal osteophytic spurring and there  is extensive fatty marrow replacement in the visualized proximal left  humerus and a coarse area of calcification in the proximal left humeral  metaphysis and humeral neck in the medullary cavity that measures 17 mm  in size, could be calcification, old bone infarct or an enchondroma.     CT angiogram images demonstrate common origin of left common carotid  artery and brachiocephalic artery off the aortic arch constituting a  bovine configuration to the aortic arch which is a normal anatomic  variation. The left subclavian artery origin is normal in appearance, no  stenosis is seen in left subclavian artery. The left vertebral artery  origin is normal in appearance, no stenosis is seen in the left  vertebral artery.  The  left common carotid origin is normal in  appearance.  No stenosis is seen in the left common carotid artery, its  bifurcation on the left,  There is coarsely calcified atherosclerotic  plaque involving the inferior wall of the left common carotid  bifurcation, but there is no stenosis in the left common carotid  bifurcation.  There is no stenosis in the left internal carotid artery  using the NASCET criteria. Brachycephalic artery origin is normal in  appearance. No stenosis is seen in the brachycephalic artery, its  bifurcation into the right subclavian and common carotid artery is  normal in appearance, no stenosis is seen in the right subclavian  artery. The right vertebral artery origin is normal in appearance, no  stenosis is seen in the right vertebral artery from its origin to the  vertebrobasilar junction. The right common carotid origin is normal in  appearance, no stenosis is seen in the right common carotid artery, its  bifurcation into the right internal and external carotid arteries is  within normal limits and no stenosis is seen in the right internal  carotid artery using the NASCET criteria.     CT angiogram images through the head demonstrate a normal appearance to  the visualized intracranial segments of the distal vertebral arteries  and normal appearance to the basilar artery and the basilar tip as well  as the posterior cerebral and superior cerebellar arteries bilaterally.  The upper cervical, petrous, cavernous and supracavernous segments of  the internal carotid arteries are within normal limits. There is an  atretic A1 segment of the right anterior cerebral artery which is a  normal anatomic variation.  The dominant left A1 segment plaque supplies  both A2 segments via a normal-appearing anterior communicating artery.  The M1 segments and middle cerebral arteries and the middle cerebral  artery trifurcations are within normal limits.       Impression:       1. The findings in the cervical  spine are similar to prior cervical  spine MRI from [facility] on 07/12/2013. There is  marked widening of the atlantodental interval up to 7 mm with bony  bridging between the posterior cortex in the anterior ring C1 and the  anterior aspect of the odontoid, and there is some bony bridging and  fusion across the right left lateral masses of C1 and C2 and due to the  anterior translation of C1 on C2 there is moderate narrowing of the  anterior posterior diameter of the canal at the C1 ring level. This is  likely an old posttraumatic deformity and correlate with clinical  history.  Cervical spondylosis is present with posterior spurring and  uncovertebral joint hypertrophy contributing to mild canal and moderate  left foraminal narrowing at C5-6 which could affect the exiting left C6  nerve root  2. There is questionable very subtle faint 2 cm area of low-density in  the inferior lateral right frontal cortex corresponding to an area of  FLAIR hyperintensity on the MRA of the head 5 1/2 hours ago, last night,  01/23/2018 at 9:51 PM. The FLAIR images on the MRA of the head also  demonstrated FLAIR high signal in the right frontal sulci and lateral  left temporal occipital sulci. The etiology of the FLAIR hyperintensity  on the MRA 5 1/2 hours ago is uncertain, and I recommend an MRI of the  brain with and without contrast to further evaluate and it can be  correlated to recent MRI of the head 01/22/2018.  I also recommend  correlation with recent lumbar puncture results.  3. There are small bilateral posterior layering effusions and some  loculated fluid in the superior aspect of the left major fissure, right  subclavian line is in place and its tip is not evaluated on this exam.  4. CT angiogram of the head and neck is otherwise within normal limits  with no stenosis seen in the great vessels in the head or neck and no  intracranial aneurysm identified.     The results of this and recommendations  from this study were  communicated to Dr. Endy Welch by telephone on 01/24/2018 at 7:30 AM.     Radiation dose reduction techniques were utilized, including automated  exposure control and exposure modulation based on body size.     This report was finalized on 1/24/2018 11:05 AM by Dr. Javier Rodrigues MD.       MRI Lumbar Spine With & Without Contrast [[id number]] Collected:  01/24/18 0827     Updated:  01/24/18 1408    Narrative:       MRI LUMBAR SPINE WITH AND WITHOUT CONTRAST     HISTORY:  Back pain. Confusion.     COMPARISON: No prior MRI of the lumbar spine is available for  comparison.     FINDINGS:     The study is hampered by patient motion. The alignment of the lumbar  spine is within normal limits. There is mild loss of disc height at  L4-L5 and L5-S1. The conus is at L1.     There is increased signal intensity involving the posterior paraspinal  musculature symmetrical extending from L1 to S1, nonspecific. There is a  small amount fluid present within the facet joints at L3-L4 and L4-L5  bilaterally.     There is heterogeneous appearance of the marrow diffusely with decreased  signal intensity involving the anterior aspect of T1 sequences without  any incident. This extends to and involves the upper thoracic spine. The  appearance of the upper thoracic spine is similar to the MRI examination  of the cervical spine performed on 07/12/2013.      L1-L2: Mild facet degenerative disease is present bilaterally.     L2-L3: There is no osseous bulge or herniation.     L3-L4: Mild facet degenerative disease is present bilaterally. There is  no evidence of disc bulge or herniation.     L4-L5:  Moderate facet degenerative disease is present bilaterally.  There is a broad-based disc osteophyte complex resulting in mild  flattening of ventral surface of the thecal sac. There is mild neural  foraminal compromise bilaterally secondary to extension of a small disc  osteophyte complex into the neural foramen.     L5-S1:  Moderate facet degenerative disease is present bilaterally. There  is a broad-based disc osteophyte complex resulting in mild flattening of  the ventral surface of the thecal sac, slightly more prominent to the  right.     The sagittal T1 postcontrast sequence demonstrates enhancement of the  cauda equina and a mildly increased signal intensity involving the CSF  within the inferior aspect of the thecal sac. There is also an epidural  fluid collection posterior to L5-S1. This measures approximately 11 mm  in AP dimension and 2 mm in AP dimension and is located at the midline.  There is enhancement of the posterior paraspinal musculature from L1-L2  to S1.     The axial T2 sequence demonstrates abnormal signal adjacent to the  kidneys bilaterally extending inferiorly, only partially visualized  suggesting perirenal fluid, not present on the CT examination of  01/20/2018.       Impression:       1.  The study is grossly abnormal with enhancement of cauda equina,  intermediate signal within the thecal sac and dural enhancement,  nonspecific. The intermediate signal may be secondary to blood products  although infection cannot be excluded. There is a thin plane of fluid  posterior to the thecal sac at L5-S1. The appearance is nonspecific.  This may be secondary to a small seroma or posttraumatic fluid  collection although infection or abscess cannot be excluded. There is  edema and enhancement involving the paraspinal musculature bilaterally.  There is no evidence of edema or enhancement involving the vertebral  bodies or disc spaces. Fluid is present within the facet joints at L3-L4  and L4-L5 bilaterally. This may be degenerative in nature although  infected facets cannot be excluded.  2.  Abnormal signal in the perirenal space bilaterally new versus the CT  examination 01/20/2018. This is only partially visualized but may be  secondary to third spacing.     The above information was called to and discussed with   Yuri.     This report was finalized on 1/24/2018 2:04 PM by Dr. Deandre Van MD.       MRI Angiogram Head Without Contrast [[id number]] Collected:  01/24/18 0013     Updated:  01/24/18 3273    Narrative:       MRI ANGIOGRAM HEAD - WITHOUT GADOLINIUM INTRAVENOUS CONTRAST.     TECHNIQUE: Routine brain MRA was performed. 3-D time-of-flight technique  was used without intravenous contrast. Source and MIP images were  reviewed.      HISTORY: Neurological change, weakness.     COMPARISON: 01/22/2018.     FINDINGS:     Abnormal gyral high signal is seen on the diffusion sequence is in the  right frontal lobe, anterior right temporal lobe and left temporal lobe.  Findings of small vessel ischemic disease, a few old lacunar infarcts  and cortical atrophy.     Bilateral internal carotid arteries are symmetric and patent. Their  bifurcations are symmetric and patent.      Anterior cerebral arteries are patent. The middle cerebral arteries are  patent.      Distal vertebral arteries are patent. The posterior inferior cerebellar  arteries (PICAs) are symmetric and patent.      The basilar artery is continuously patent. The superior cerebellar  arteries are symmetric and patent.      The P1 segments are patent. The posterior cerebral arteries (PCAs) are  patent. The posterior communicating arteries are symmetric and patent.      1 cm on Tornwaldt cyst is suspected, further evaluation is recommended.       Impression:       Moderate amount of subarachnoid hemorrhage layering the right frontal  lobe and both temporal lobes. Differential diagnoses includes  encephalitis.  1 cm on Tornwaldt cyst is suspected, further evaluation with CT scan or  MRI of the head and neck may be performed.     Findings called to patient's nurse Ms. Cortez, 12:13 AM.     This report was finalized on 1/24/2018 11:50 PM by Dr. Mayelin Carter MD.       MRI Thoracic Spine With & Without Contrast [[id number]] Collected:  01/24/18 0047     Updated:   01/24/18 2353    Narrative:       MRI OF THE THORACIC SPINE WITHOUT AND WITH CONTRAST.     TECHNIQUE: Multisequence multiplanar MRI images of the thoracic spine.     HISTORY: Mid back thoracic pain.     COMPARISON: No prior studies for comparison.     FINDINGS:  Vertebral body height and signal is normal, no acute fracture.   Intervertebral discs demonstrates mild desiccation changes in the  midthoracic spine. Small disc osteophyte is seen at T3-4 causing mild  spinal canal stenosis.     Thoracic spinal cord demonstrates normal caliber, no atrophy or  swelling.     Small left pleural effusion is incidentally noted.       Impression:       Mild spondylosis of the thoracic spine, small disc osteophyte complex at  T3-4. No fracture or dislocation.  Small left pleural effusion.     This report was finalized on 1/24/2018 11:50 PM by Dr. Mayelin Carter MD.       MRI Brain With & Without Contrast [[id number]] Collected:  01/24/18 1058     Updated:  01/25/18 0653    Narrative:       MRI EXAMINATION OF THE BRAIN WITH AND WITHOUT CONTRAST     HISTORY: Confusion, altered mental status.     COMPARISON: MRI of the brain 01/22/2018, MRA 01/23/2018 and CT angiogram  01/24/2018.     FINDINGS: There is no evidence of restricted diffusion to suggest acute  infarction.     The axial T2 FLAIR sequence demonstrates increased signal intensity  involving the sulci of the right frontal lobe laterally as well as  extending superiorly to the vertex, more prominent as compared to the T2  FLAIR examination performed on 01/22/2018. There is also increased  signal intensity involving the cortex of the right frontal lobe  laterally evident on the T2 and T2 FLAIR sequences. A similar process is  appreciated involving the left temporal lobe laterally, smaller in area.  There is increased signal intensity involving the sulci involving the  parietal lobes bilaterally, present previously.     There is increased signal intensity appreciated involving  the basal  ganglion on the right inferiorly on the T2 FLAIR sequence. This area  measures approximately 9 mm in transverse dimension and is more  prominent as compared to the prior examination. There are small foci of  increased signal intensity appreciated involving the white matter of the  cerebellar hemispheres bilaterally on the T2 FLAIR sequence which are  slightly more prominent as compared to prior examination.     The axial T2 FLAIR sequence demonstrates increased signal intensity  involving the sulci overlying the left frontal lobe laterally which is  new versus the MRI examination of 01/23/2018.     There is mild prominence of the lateral ventricles, similar in  appearance compared to prior examination.     The axial T1 precontrast sequence demonstrates a mild degree of  increased signal intensity involving the right frontal lobe laterally.  This may be related to the contrast which was administered on  01/22/2018. Petechial hemorrhage could also be considered but is less  likely. There is no evidence of signal loss on the susceptibility  weighted imaging at this location.     After contrast administration there was increased signal intensity  involving the cortex as well as sulci of the right frontal lobe  laterally and involving the left temporal lobe laterally to a lesser  extent. There is faint enhancement involving sulci in the left temporal  region laterally. There is enhancement involving the inferior aspect of  the basal ganglia on the right (approximately 6 mm) as well as  enhancement involving the left cerebellar hemisphere centrally (4-5 mm  in size). This cerebellar enhancement was not present previously. The  enhancement involving the basal ganglia on the right inferiorly was not  present previously.       Impression:       Complex and unusual presentation with multifocal areas of  sulcal T2 FLAIR hyperintensity as well as areas of T2 and T2 FLAIR  hyperintensity involving the right frontal  lobe laterally, left temporal  lobe laterally, basal ganglia inferiorly and left cerebellar hemisphere  centrally. These areas are more prominent as compared to the prior  examination and there is enhancement involving the left cerebral  hemisphere, basal ganglia on the right inferiorly and sulcal  hyperintensity evident on the postcontrast sequence as well. The  multivascular distribution suggests a multifocal process with breakdown  of the blood-brain barrier. This may be secondary to an inflammatory  infectious process such as meningitis or encephalitis. However no  convincing areas of T2 FLAIR hyperintensity are noted within the  dependent portions of the ventricular system or involving the basilar  cisterns. A vasculitis could also be considered. There is mild  prominence of the lateral ventricles, unchanged. There is no evidence of  restricted diffusion to suggest acute infarction.     The above information was called to and discussed with Dr. Welch.     This report was finalized on 1/25/2018 6:50 AM by Dr. Deandre Van MD.       IR Lumbar Puncture Diagnosis [[id number]] Collected:  01/25/18 1129     Updated:  01/25/18 1150    Narrative:       FLUOROSCOPIC GUIDED LUMBAR PUNCTURE  - 01/25/2018.     HISTORY: Altered mental status.     After signed informed consent was obtained, the patient was prepped and  draped in the prone position. Lidocaine was used for local anesthesia.     An 18-gauge needle was introduced into the thecal sac at a midlumbar  level by Dr. Tejeda. Only a very tiny amount of slightly bloody CSF  was visualized. The needle was repositioned into the thecal sac and  still no significant fluid could be returned. Two additional upper to  mid lumbar levels were attempted and only approximately 1 mL to 2 mL of  slightly bloody CSF could be sampled.     Opening pressure is less than 1 cm to 2 cm.       Impression:       Limited CSF could be sampled during the lumbar puncture as  discussed above.  Findings were discussed with Dr. Welch.      Fluoroscopy time 4 minutes 39 seconds. 4 images.        XR Chest 1 View [[id number]] Collected:  01/25/18 1832     Updated:  01/25/18 1837    Narrative:       PORTABLE CHEST 1/25/2018 AT 1820 HOURS.     CLINICAL HISTORY: Evaluate ET tube placement.     Compared to the chest x-ray performed earlier today 1415 hours.     In the interval, an endotracheal tube has been inserted and appears in  satisfactory position with its tip couple centimeters above the mary.  A PICC line in the right upper extremity remains in satisfactory  position without change. The lungs are better inflated. There is minimal  increased density in the lingula of the left upper lobe producing  partial obscuration of the left heart border that appears unchanged. The  right lung remains clear. The heart is normal in size.     This report was finalized on 1/25/2018 6:34 PM by Dr. Tim Christie MD.       XR Chest 1 View [[id number]] Collected:  01/25/18 1455     Updated:  01/25/18 2118    Narrative:       PORTABLE CHEST X-RAY     HISTORY: Shortness of breath with exertion.     Portable chest x-ray is provided and correlated with CT chest dated  01/20/2018 and chest x-ray 07/25/2013.     FINDINGS: There is a right PICC line with its tip in the mid to lower  portion of the superior vena cava. The cardiomediastinal silhouette  appears normal. Vascular volume is normal. There may be tiny pleural  effusions blunting the costophrenic angles. The lungs appear clear  except for some subtle increased density laterally in the left lung base  which corresponds to the infiltrate seen in the lingula on the recent  CT. There is no pneumothorax. Advanced degenerative change is observed  at each shoulder. There also appear to be chronic full-thickness rotator  cuff tears.       Impression:       PICC line has its tip in the superior vena cava. Some subtle  density in the lingula corresponds to infiltrate seen in that  area on CT  5 days ago. The lungs are otherwise clear. There appear to be small  pleural effusions bilaterally.     This report was finalized on 1/25/2018 9:15 PM by Dr. Raad Toth MD.       XR Abdomen KUB [[id number]] Collected:  01/25/18 2322     Updated:  01/25/18 2322    Narrative:       X-RAY ABDOMEN ONE VIEW KUB.     HISTORY:  Cortrak placement.     COMPARISON:  No prior studies for comparison.     FINDINGS:   Bowel gas pattern is unremarkable. No abnormal calcifications are seen.     No free air in the abdomen. Tip of the Cortrak tube is in the stomach.       Impression:       No acute findings in the abdomen. Tip of the Cortrak tube is in the  stomach.                 Lab Results (last 7 days)     Procedure Component Value Units Date/Time    Lactic Acid, Plasma [[id number]]  (Abnormal) Collected:  01/19/18 1934    Specimen:  Blood Updated:  01/19/18 2028     Lactate 2.1 (C) mmol/L     Lactic Acid, Reflex Timer (This will reflex a repeat order 3-3:15 hours after ordered.) [[id number]] Collected:  01/19/18 1934    Specimen:  Blood Updated:  01/19/18 2331     Extra Tube Hold for add-ons.      Auto resulted.       Lactic Acid, Reflex [[id number]]  (Normal) Collected:  01/20/18 0029    Specimen:  Blood Updated:  01/20/18 0051     Lactate 1.7 mmol/L     Urinalysis - Urine, Clean Catch [[id number]]  (Abnormal) Collected:  01/20/18 0219    Specimen:  Urine from Urine, Clean Catch Updated:  01/20/18 0240     Color, UA Yellow     Appearance, UA Clear     pH, UA 6.5     Specific Gravity, UA 1.011     Glucose, UA Negative     Ketones, UA Negative     Bilirubin, UA Negative     Blood, UA Trace (A)     Protein,  mg/dL (2+) (A)     Leuk Esterase, UA Negative     Nitrite, UA Negative     Urobilinogen, UA 1.0 E.U./dL    Sodium, Urine, Random - [[id number]] Collected:  01/20/18 0220    Specimen:  Urine Updated:  01/20/18 0246     Sodium, Urine 75 mmol/L     Narrative:       Reference intervals for random urine have not  been established.  Clinical usage is dependent upon physician's interpretation in combination with other laboratory tests.     Creatinine, Urine, Random - Urine, Clean Catch [[id number]] Collected:  01/20/18 0219    Specimen:  Urine from Urine, Clean Catch Updated:  01/20/18 0256     Creatinine, Urine 40.1 mg/dL     Narrative:       Reference intervals for random urine have not been established.  Clinical usage is dependent upon physician's interpretation in combination with other laboratory tests.     Renal Function Panel [[id number]]  (Abnormal) Collected:  01/20/18 0445    Specimen:  Blood Updated:  01/20/18 0540     Glucose 69 mg/dL      BUN 48 (H) mg/dL      Creatinine 1.24 (H) mg/dL      Sodium 135 (L) mmol/L      Potassium 4.7 mmol/L      Chloride 101 mmol/L      CO2 21.6 (L) mmol/L      Calcium 8.0 (L) mg/dL      Albumin 2.00 (L) g/dL      Phosphorus 4.3 mg/dL      Anion Gap 12.4 mmol/L      BUN/Creatinine Ratio 38.7 (H)     eGFR   Amer 53 (L) mL/min/1.73     CBC & Differential [[id number]] Collected:  01/20/18 0602    Specimen:  Blood Updated:  01/20/18 0716    Narrative:       The following orders were created for panel order CBC & Differential.  Procedure                               Abnormality         Status                     ---------                               -----------         ------                     Scan Slide[[id number]]                                       Final result               CBC Auto Differential[[id number]]        Abnormal            Final result                 Please view results for these tests on the individual orders.    CBC Auto Differential [[id number]]  (Abnormal) Collected:  01/20/18 0602    Specimen:  Blood Updated:  01/20/18 0716     WBC 4.88 10*3/mm3      RBC 3.31 (L) 10*6/mm3      Hemoglobin 7.0 (L) g/dL      Hematocrit 22.9 (L) %      MCV 69.2 (L) fL      MCH 21.1 (L) pg      MCHC 30.6 (L) g/dL      RDW 19.9 (H) %      RDW-SD 49.3 fl      Platelets 126 (L)  "10*3/mm3      Neutrophil % 50.5 %      Lymphocyte % 42.4 %      Monocyte % 5.7 %      Eosinophil % 0.4 %      Basophil % 0.2 %      Immature Grans % 0.8 (H) %      Neutrophils, Absolute 2.46 10*3/mm3      Lymphocytes, Absolute 2.07 10*3/mm3      Monocytes, Absolute 0.28 10*3/mm3      Eosinophils, Absolute 0.02 10*3/mm3      Basophils, Absolute 0.01 10*3/mm3      Immature Grans, Absolute 0.04 (H) 10*3/mm3     Scan Slide [[id number]] Collected:  01/20/18 0602    Specimen:  Blood Updated:  01/20/18 0716     Anisocytosis Mod/2+     Hypochromia Mod/2+     Macrocytes Mod/2+     RBC Fragments Slight/1+     Target Cells Slight/1+     WBC Morphology Normal     Platelet Morphology Normal    C-reactive Protein [[id number]]  (Abnormal) Collected:  01/20/18 0445    Specimen:  Blood Updated:  01/20/18 0941     C-Reactive Protein 10.72 (H) mg/dL     Cortisol [[id number]]  (Normal) Collected:  01/20/18 0445    Specimen:  Blood Updated:  01/20/18 0946     Cortisol 7.97 mcg/dL     Narrative:       Cortisol Reference Ranges:    Cortisol 6AM - 10AM Range: 6.02-18.40 mcg/dl  Cortisol 4PM - 8PM Range: 2.68-10.50 mcg/dl  Critical AM/PM:    Less than 2 mcg/dl    Procalcitonin [[id number]]  (Abnormal) Collected:  01/20/18 0445    Specimen:  Blood Updated:  01/20/18 0958     Procalcitonin 1.01 (C) ng/mL     Narrative:       As a Marker for Sepsis (Non-Neonates):   1. <0.5 ng/mL represents a low risk of severe sepsis and/or septic shock.  1. >2 ng/mL represents a high risk of severe sepsis and/or septic shock.    As a Marker for Lower Respiratory Tract Infections that require antibiotic therapy:  PCT on Admission     Antibiotic Therapy             6-12 Hrs later  > 0.5                Strongly Recommended            >0.25 - <0.5         Recommended  0.1 - 0.25           Discouraged                   Remeasure/reassess PCT  <0.1                 Strongly Discouraged          Remeasure/reassess PCT      As 28 day mortality risk marker: \"Change in " "Procalcitonin Result\" (> 80 % or <=80 %) if Day 0 (or Day 1) and Day 4 values are available. Refer to [email or web address]   Change in PCT <=80 %   A decrease of PCT levels below or equal to 80 % defines a positive change in PCT test result representing a higher risk for 28-day all-cause mortality of patients diagnosed with severe sepsis or septic shock.  Change in PCT > 80 %   A decrease of PCT levels of more than 80 % defines a negative change in PCT result representing a lower risk for 28-day all-cause mortality of patients diagnosed with severe sepsis or septic shock.                Respiratory Panel, PCR - Swab, Nasopharynx [[id number]]  (Normal) Collected:  01/20/18 1127    Specimen:  Swab from Nasopharynx Updated:  01/20/18 1324     ADENOVIRUS, PCR Not Detected     Coronavirus 229E Not Detected     Coronavirus HKU1 Not Detected     Coronavirus NL63 Not Detected     Coronavirus OC43 Not Detected     Human Metapneumovirus Not Detected     Human Rhinovirus/Enterovirus Not Detected     Influenza B PCR Not Detected     Parainfluenza Virus 1 Not Detected     Parainfluenza Virus 2 Not Detected     Parainfluenza Virus 3 Not Detected     Parainfluenza Virus 4 Not Detected     Bordetella pertussis pcr Not Detected     Influenza A H1N1 2009 PCR Not Detected     Chlamydophila pneumoniae PCR Not Detected     Mycoplasma pneumo by PCR Not Detected     Influenza A PCR Not Detected     Influenza A H3 Not Detected     Influenza A H1 Not Detected     RSV, PCR Not Detected    Hemoglobin & Hematocrit, Blood [[id number]]  (Abnormal) Collected:  01/20/18 1725    Specimen:  Blood Updated:  01/20/18 1743     Hemoglobin 7.1 (L) g/dL      Hematocrit 24.0 (L) %     Comprehensive Metabolic Panel [[id number]]  (Abnormal) Collected:  01/21/18 0541    Specimen:  Blood Updated:  01/21/18 0648     Glucose 80 mg/dL      BUN 34 (H) mg/dL      Creatinine 1.14 (H) mg/dL      Sodium 135 (L) mmol/L      Potassium 4.1 mmol/L      " Chloride 97 (L) mmol/L      CO2 25.4 mmol/L      Calcium 7.7 (L) mg/dL      Total Protein 6.7 g/dL      Albumin 1.90 (L) g/dL      ALT (SGPT) 20 U/L      AST (SGOT) 57 (H) U/L      Alkaline Phosphatase 392 (H) U/L      Total Bilirubin 0.4 mg/dL      eGFR   Amer 58 (L) mL/min/1.73      Globulin 4.8 gm/dL      A/G Ratio 0.4 g/dL      BUN/Creatinine Ratio 29.8 (H)     Anion Gap 12.6 mmol/L     CBC (No Diff) [[id number]]  (Abnormal) Collected:  01/21/18 0541    Specimen:  Blood Updated:  01/21/18 0721     WBC 5.23 10*3/mm3      RBC 3.17 (L) 10*6/mm3      Hemoglobin 6.6 (C) g/dL      Hematocrit 21.9 (L) %      MCV 69.1 (L) fL      MCH 20.8 (L) pg      MCHC 30.1 (L) g/dL      RDW 19.6 (H) %      RDW-SD 47.8 fl      Platelets 121 (L) 10*3/mm3     HIV-1 & HIV-2 Antibodies [[id number]] Collected:  01/21/18 0541    Specimen:  Blood Updated:  01/21/18 0724    Narrative:       The following orders were created for panel order HIV-1 & HIV-2 Antibodies.  Procedure                               Abnormality         Status                     ---------                               -----------         ------                     HIV-1 / O / 2 Ag / Antib...[[id number]]  Normal              Final result                 Please view results for these tests on the individual orders.    HIV-1 / O / 2 Ag / Antibody 4th Generation [[id number]]  (Normal) Collected:  01/21/18 0541    Specimen:  Blood Updated:  01/21/18 0724     HIV-1/ HIV-2 Non-Reactive     HIV-1 P24 Ag Screen Non-Reactive    Hepatitis C Antibody [[id number]]  (Normal) Collected:  01/21/18 0541    Specimen:  Blood Updated:  01/21/18 0922     Hepatitis C Ab Non-Reactive    Lactate Dehydrogenase [[id number]]  (Abnormal) Collected:  01/21/18 0541    Specimen:  Blood Updated:  01/21/18 0955      (H) U/L     Glucose, CSF - Cerebrospinal Fluid, Lumbar Puncture [[id number]]  (Normal) Collected:  01/21/18 1453    Specimen:  Cerebrospinal Fluid from Lumbar Puncture Updated:   01/21/18 1727     Glucose, CSF 45 mg/dL     Protein, CSF - Cerebrospinal Fluid, Lumbar Puncture [[id number]]  (Abnormal) Collected:  01/21/18 1453    Specimen:  Cerebrospinal Fluid from Lumbar Puncture Updated:  01/21/18 1727     Protein, Total (CSF) 48.0 (H) mg/dL     Cell Count, CSF - Cerebrospinal Fluid, Lumbar Puncture [[id number]]  (Abnormal) Collected:  01/21/18 1453    Specimen:  Cerebrospinal Fluid from Lumbar Puncture Updated:  01/21/18 1751     Color, CSF Pink (A)     Appearance, CSF Slightly Hazy (A)     RBC, CSF 1640 (H) /mm3      Nucleated Cells, CSF 11 (H) /mm3      Tube Number, CSF 1    Cell Count With Differential, CSF Use tube: 1 [[id number]] Collected:  01/21/18 1453    Specimen:  Cerebrospinal Fluid from Lumbar Puncture Updated:  01/21/18 1751    Narrative:       The following orders were created for panel order Cell Count With Differential, CSF Use tube: 1.  Procedure                               Abnormality         Status                     ---------                               -----------         ------                     Cell Count, CSF - Cerebr...[[id number]]  Abnormal            Final result               Spinal fluid differentia...[[id number]]                      Final result                 Please view results for these tests on the individual orders.    Spinal fluid differential - Cerebrospinal Fluid, Lumbar Puncture [[id number]] Collected:  01/21/18 1453    Specimen:  Cerebrospinal Fluid from Lumbar Puncture Updated:  01/21/18 1751     Neutrophils, CSF 2 %      Lymphocytes, CSF 86 %      Monocytes, CSF 5 %      Mononuclear, CSF 7 %      Other Cells, CSF 1 /100 WBCs     Hemoglobin & Hematocrit, Blood [[id number]]  (Abnormal) Collected:  01/21/18 1937    Specimen:  Blood Updated:  01/21/18 2008     Hemoglobin 8.0 (L) g/dL      Hematocrit 25.4 (L) %     Magnesium [[id number]]  (Normal) Collected:  01/21/18 1937    Specimen:  Blood Updated:  01/21/18 2023     Magnesium 1.9 mg/dL      Comprehensive Metabolic Panel [[id number]]  (Abnormal) Collected:  01/22/18 0455    Specimen:  Blood Updated:  01/22/18 0645     Glucose 78 mg/dL      BUN 32 (H) mg/dL      Creatinine 1.13 (H) mg/dL      Sodium 134 (L) mmol/L      Potassium 3.7 mmol/L      Chloride 98 mmol/L      CO2 21.7 (L) mmol/L      Calcium 7.7 (L) mg/dL      Total Protein 6.7 g/dL      Albumin 1.90 (L) g/dL      ALT (SGPT) 19 U/L      AST (SGOT) 56 (H) U/L      Alkaline Phosphatase 346 (H) U/L      Total Bilirubin 0.4 mg/dL      eGFR   Amer 59 (L) mL/min/1.73      Globulin 4.8 gm/dL      A/G Ratio 0.4 g/dL      BUN/Creatinine Ratio 28.3 (H)     Anion Gap 14.3 mmol/L     CBC (No Diff) [[id number]]  (Abnormal) Collected:  01/22/18 0455    Specimen:  Blood Updated:  01/22/18 0649     WBC 3.85 (L) 10*3/mm3      RBC 3.62 (L) 10*6/mm3      Hemoglobin 8.0 (L) g/dL      Hematocrit 26.1 (L) %      MCV 72.1 (L) fL      MCH 22.1 (L) pg      MCHC 30.7 (L) g/dL      RDW 20.8 (H) %      RDW-SD 53.6 fl      MPV -- fL       .        Platelets 120 (L) 10*3/mm3     RPR [[id number]]  (Normal) Collected:  01/21/18 0541    Specimen:  Blood Updated:  01/22/18 0724     RPR Non-Reactive    Ferritin [[id number]]  (Abnormal) Collected:  01/22/18 0455    Specimen:  Blood Updated:  01/22/18 1342     Ferritin 1812.00 (H) ng/mL     Procalcitonin [[id number]]  (Abnormal) Collected:  01/22/18 0455    Specimen:  Blood Updated:  01/22/18 1345     Procalcitonin 0.94 (C) ng/mL     Narrative:       As a Marker for Sepsis (Non-Neonates):   1. <0.5 ng/mL represents a low risk of severe sepsis and/or septic shock.  1. >2 ng/mL represents a high risk of severe sepsis and/or septic shock.    As a Marker for Lower Respiratory Tract Infections that require antibiotic therapy:  PCT on Admission     Antibiotic Therapy             6-12 Hrs later  > 0.5                Strongly Recommended            >0.25 - <0.5         Recommended  0.1 - 0.25           Discouraged                    "Remeasure/reassess PCT  <0.1                 Strongly Discouraged          Remeasure/reassess PCT      As 28 day mortality risk marker: \"Change in Procalcitonin Result\" (> 80 % or <=80 %) if Day 0 (or Day 1) and Day 4 values are available. Refer to [email or web address]-[email or web address]/   Change in PCT <=80 %   A decrease of PCT levels below or equal to 80 % defines a positive change in PCT test result representing a higher risk for 28-day all-cause mortality of patients diagnosed with severe sepsis or septic shock.  Change in PCT > 80 %   A decrease of PCT levels of more than 80 % defines a negative change in PCT result representing a lower risk for 28-day all-cause mortality of patients diagnosed with severe sepsis or septic shock.                Rheumatoid Factor, Quant [[id number]]  (Abnormal) Collected:  01/22/18 0455    Specimen:  Blood Updated:  01/22/18 1534     Rheumatoid Factor Quantitative 42.6 (H) IU/mL     Comprehensive Metabolic Panel [[id number]]  (Abnormal) Collected:  01/23/18 0542    Specimen:  Blood Updated:  01/23/18 0657     Glucose 73 mg/dL      BUN 26 (H) mg/dL      Creatinine 1.04 (H) mg/dL      Sodium 137 mmol/L      Potassium 3.4 (L) mmol/L      Chloride 105 mmol/L      CO2 20.6 (L) mmol/L      Calcium 6.8 (L) mg/dL      Total Protein 5.7 (L) g/dL      Albumin 1.40 (L) g/dL      ALT (SGPT) 14 U/L      AST (SGOT) 40 (H) U/L      Alkaline Phosphatase 268 (H) U/L      Total Bilirubin 0.4 mg/dL      eGFR  African Amer 65 mL/min/1.73      Globulin 4.3 gm/dL      A/G Ratio 0.3 g/dL      BUN/Creatinine Ratio 25.0     Anion Gap 11.4 mmol/L     CBC (No Diff) [[id number]]  (Abnormal) Collected:  01/23/18 0542    Specimen:  Blood Updated:  01/23/18 0722     WBC 3.39 (L) 10*3/mm3      RBC 3.39 (L) 10*6/mm3      Hemoglobin 7.4 (L) g/dL      Hematocrit 24.2 (L) %      MCV 71.4 (L) fL      MCH 21.8 (L) pg      MCHC 30.6 (L) g/dL      RDW 21.4 (H) %      RDW-SD 54.6 (H) fl      MPV -- fL       .        " Platelets 98 (L) 10*3/mm3     Lipid Panel [[id number]]  (Abnormal) Collected:  01/24/18 0249    Specimen:  Blood Updated:  01/24/18 0345     Total Cholesterol 102 mg/dL      Triglycerides 347 (H) mg/dL      HDL Cholesterol 9 (L) mg/dL      LDL Cholesterol  24 mg/dL      VLDL Cholesterol 69.4 (H) mg/dL      LDL/HDL Ratio 2.62    Narrative:       Cholesterol Reference Ranges  (U.S. Department of Health and Human Services ATP III Classifications)    Desirable          <200 mg/dL  Borderline High    200-239 mg/dL  High Risk          >240 mg/dL      Triglyceride Reference Ranges  (U.S. Department of Health and Human Services ATP III Classifications)    Normal           <150 mg/dL  Borderline High  150-199 mg/dL  High             200-499 mg/dL  Very High        >500 mg/dL    HDL Reference Ranges  (U.S. Department of Health and Human Services ATP III Classifcations)    Low     <40 mg/dl (major risk factor for CHD)  High    >60 mg/dl ('negative' risk factor for CHD)        LDL Reference Ranges  (U.S. Department of Health and Human Services ATP III Classifcations)    Optimal          <100 mg/dL  Near Optimal     100-129 mg/dL  Borderline High  130-159 mg/dL  High             160-189 mg/dL  Very High        >189 mg/dL    Basic Metabolic Panel [[id number]]  (Abnormal) Collected:  01/24/18 0249    Specimen:  Blood Updated:  01/24/18 0345     Glucose 80 mg/dL      BUN 28 (H) mg/dL      Creatinine 1.04 (H) mg/dL      Sodium 143 mmol/L      Potassium 3.2 (L) mmol/L      Chloride 113 (H) mmol/L      CO2 18.3 (L) mmol/L      Calcium 6.6 (L) mg/dL      eGFR  African Amer 65 mL/min/1.73      BUN/Creatinine Ratio 26.9 (H)     Anion Gap 11.7 mmol/L     Narrative:       GFR Normal >60  Chronic Kidney Disease <60  Kidney Failure <15    TSH [[id number]]  (Normal) Collected:  01/24/18 0249    Specimen:  Blood Updated:  01/24/18 0351     TSH 0.295 mIU/mL     CBC (No Diff) [[id number]]  (Abnormal) Collected:  01/24/18 0249    Specimen:  Blood  Updated:  01/24/18 0354     WBC 2.75 (L) 10*3/mm3      RBC 3.35 (L) 10*6/mm3      Hemoglobin 7.3 (L) g/dL      Hematocrit 24.3 (L) %      MCV 72.5 (L) fL      MCH 21.8 (L) pg      MCHC 30.0 (L) g/dL      RDW 22.2 (H) %      RDW-SD 57.8 (H) fl      MPV -- fL       .        Platelets 69 (L) 10*3/mm3     Hemoglobin A1c [[id number]]  (Normal) Collected:  01/24/18 0249    Specimen:  Blood Updated:  01/24/18 0426     Hemoglobin A1C 5.60 %     Narrative:       Hemoglobin A1C Ranges:    Increased Risk for Diabetes  5.7% to 6.4%  Diabetes                     >= 6.5%  Diabetic Goal                < 7.0%    Histoplasma Ag Ur - Urine, Clean Catch [[id number]] Collected:  01/20/18 1343    Specimen:  Urine from Urine, Clean Catch Updated:  01/24/18 0512     Reference Lab Report See attached report    Narrative:       See original report from [employer].    Culture, CSF - Cerebrospinal Fluid, Lumbar Puncture [[id number]]  (Normal) Collected:  01/21/18 1453    Specimen:  Cerebrospinal Fluid from Lumbar Puncture Updated:  01/24/18 0925     CSF Culture No growth at 3 days     Gram Stain Result No WBCs or organisms seen    Respiratory Culture - Sputum, Cough [[id number]] Collected:  01/21/18 2147    Specimen:  Sputum from Cough Updated:  01/24/18 1101     Respiratory Culture --      Light growth (2+) Normal Respiratory Mariella     Gram Stain Result Few (2+) WBCs seen      Few (2+) Epithelial cells per low power field      Mixed bacterial morphotypes seen on Gram Stain    Blood Culture - Blood, Blood, Venous Line [[id number]]  (Normal) Collected:  01/19/18 1934    Specimen:  Blood from Blood, Venous Line Updated:  01/24/18 2001     Blood Culture No growth at 5 days    Blood Culture - Blood, Blood, Venous Line [[id number]]  (Normal) Collected:  01/19/18 2202    Specimen:  Blood from Blood, Venous Line Updated:  01/24/18 2216     Blood Culture No growth at 5 days    HSV 1/2, PCR - Cerebrospinal Fluid, Lumbar Puncture [[id number]]  Collected:  01/21/18 1453    Specimen:  Cerebrospinal Fluid from Lumbar Puncture Updated:  01/25/18 0618     HSV-1 DNA Negative     HSV-2 DNA Negative      This test was developed and its performance characteristics determined  by [employer]. It has not been cleared or approved by the  U.S. Food and Drug Administration. The FDA has determined that such  clearance or approval is not necessary. This test is used for clinical  purposes. It should not be regarded as investigational or research.       Narrative:       Performed at:  [address] [place] [address]  [id number]  : Vikram Gann MD, Phone:  [phone]    CK [[id number]]  (Normal) Collected:  01/25/18 0551    Specimen:  Blood Updated:  01/25/18 0636     Creatine Kinase 152 U/L     Comprehensive Metabolic Panel [[id number]]  (Abnormal) Collected:  01/25/18 0551    Specimen:  Blood Updated:  01/25/18 0636     Glucose 103 (H) mg/dL      BUN 30 (H) mg/dL      Creatinine 1.09 (H) mg/dL      Sodium 145 mmol/L      Potassium 3.1 (L) mmol/L      Chloride 115 (H) mmol/L      CO2 19.6 (L) mmol/L      Calcium 7.3 (L) mg/dL      Total Protein 6.2 g/dL      Albumin 1.70 (L) g/dL      ALT (SGPT) 14 U/L      AST (SGOT) 55 (H) U/L      Alkaline Phosphatase 277 (H) U/L      Total Bilirubin 0.4 mg/dL      eGFR   Amer 61 mL/min/1.73      Globulin 4.5 gm/dL      A/G Ratio 0.4 g/dL      BUN/Creatinine Ratio 27.5 (H)     Anion Gap 10.4 mmol/L     CBC (No Diff) [[id number]]  (Abnormal) Collected:  01/25/18 0551    Specimen:  Blood Updated:  01/25/18 0806     WBC 3.31 (L) 10*3/mm3      RBC 3.87 (L) 10*6/mm3      Hemoglobin 9.1 (L) g/dL      Hematocrit 29.3 (L) %      MCV 75.7 (L) fL      MCH 23.5 (L) pg      MCHC 31.1 (L) g/dL      RDW 23.4 (H) %      RDW-SD 63.1 (H) fl      Platelets 94 (L) 10*3/mm3     Cell Count, CSF - Cerebrospinal Fluid, Lumbar Puncture [[id number]]  (Abnormal) Collected:  01/25/18 1046    Specimen:   Cerebrospinal Fluid from Lumbar Puncture Updated:  01/25/18 1147     Color, CSF Red (A)     Supernatant Color, CSF Red (A)     Appearance, CSF Cloudy (A)     RBC, CSF 150643 (H) /mm3       Estimated count due to clots present in specimen.        Nucleated Cells,  (H) /mm3       Estimated count due to clots present in specimen.        Tube Number, CSF 2      Only 1 tube received in lab and it's marked tube 2        Method: Automated XE 5000 Method    Glucose, CSF - Cerebrospinal Fluid, Lumbar Puncture [[id number]]  (Abnormal) Collected:  01/25/18 1046    Specimen:  Cerebrospinal Fluid from Lumbar Puncture Updated:  01/25/18 1157     Glucose, CSF 79 (H) mg/dL     Cell Count With Differential, CSF Use tube: 1 [[id number]] Collected:  01/25/18 1046    Specimen:  Cerebrospinal Fluid from Lumbar Puncture Updated:  01/25/18 1202    Narrative:       The following orders were created for panel order Cell Count With Differential, CSF Use tube: 1.  Procedure                               Abnormality         Status                     ---------                               -----------         ------                     Cell Count, CSF - Cerebr...[[id number]]  Abnormal            Final result               Spinal fluid differentia...[[id number]]                      Final result                 Please view results for these tests on the individual orders.    Spinal fluid differential - Cerebrospinal Fluid, Lumbar Puncture [[id number]] Collected:  01/25/18 1046    Specimen:  Cerebrospinal Fluid from Lumbar Puncture Updated:  01/25/18 1202     Neutrophils, CSF 44 %      Lymphocytes, CSF 51 %      Monocytes, CSF 5 %      nRBC, CSF 1 /100 WBCs     Protein, CSF - Cerebrospinal Fluid, Lumbar Puncture [[id number]]  (Abnormal) Collected:  01/25/18 1046    Specimen:  Cerebrospinal Fluid from Lumbar Puncture Updated:  01/25/18 1209     Protein, Total (CSF) 463.0 (H) mg/dL     Meningitis / Encephalitis Panel, PCR - Cerebrospinal Fluid,  Lumbar Puncture [[id number]]  (Normal) Collected:  01/25/18 1046    Specimen:  Cerebrospinal Fluid from Lumbar Puncture Updated:  01/25/18 1348     ESCHERICHIA COLI K1, PCR Not Detected     HAEMOPHILUS INFLUENZAE, PCR Not Detected     LISTERIA MONOCYTOGENES, PCR Not Detected     NEISSERIA MENINGITIDIS, PCR Not Detected     STREPTOCOCCUS AGALACTIAE, PCR Not Detected     STREPTOCOCCUS PNEUMONIAE, PCR Not Detected     CYTOMEGALOVIRUS (CMV), PCR Not Detected     ENTEROVIRUS, PCR Not Detected     HERPES SIMPLEX VIRUS 1 (HSV-1), PCR Not Detected     HERPES SIMPLEX VIRUS 2 (HSV-2), PCR Not Detected     HUMAN PARECHOVIRUS, PCR Not Detected     VARICELLA ZOSTER VIRUS (VZV), PCR Not Detected     CRYPTOCOCCUS NEOFORMANS / GATTII, PCR Not Detected     HUMAN HERPES VIRUS 6 PCR Not Detected    Magnesium [[id number]]  (Normal) Collected:  01/25/18 0551    Specimen:  Blood Updated:  01/25/18 1355     Magnesium 1.9 mg/dL     POC Glucose Once [[id number]]  (Normal) Collected:  01/25/18 1356    Specimen:  Blood Updated:  01/25/18 1357     Glucose 92 mg/dL     Narrative:       Meter: [id number] : [id number] Greg Doeanda    Blood Gas, Arterial [[id number]]  (Abnormal) Collected:  01/25/18 1405    Specimen:  Arterial Blood Updated:  01/25/18 1407     Site Arterial: right radial     Musa's Test Positive     pH, Arterial 7.431 pH units      pCO2, Arterial 28.7 (L) mm Hg      pO2, Arterial 81.9 mm Hg      HCO3, Arterial 19.1 (L) mmol/L      Base Excess, Arterial -4.3 (L) mmol/L      O2 Saturation Calculated 96.5 %      Barometric Pressure for Blood Gas 759.6 mmHg      Modality HFNC     Flow Rate 15 lpm      Set [facility] Resp Rate 32     Rate 32 Breaths/minute     Narrative:       o2 sat 97 Meter: [id number] : [id number] Jasonberenice Hernandez    ABI Comprehensive Panel [[id number]]  (Abnormal) Collected:  01/22/18 1633    Specimen:  Blood Updated:  01/25/18 1522     Anti-DNA (DS) Ab Qn >300 (H) IU/mL                                           Negative      <5                                     Equivocal  5 - 9                                     Positive      >9        RNP Antibodies >8.0 (H) AI      Tamez Antibodies >8.0 (H) AI      Antiscleroderma-70 Antibodies <0.2 AI      TORI SSA (RO) Ab 0.7 AI      TORI SSB (LA) Ab <0.2 AI      Antichromatin Antibodies >8.0 (H) AI      ANALI-1 IgG <0.2 AI      Anti-Centromere B Antibodies -- AI       Repeatedly unable to obtain valid results. This may be due to inter-  ference from immune complexing or other immunoglobulin interactions.  Suggest repeat on a fresh specimen in 2 to 4 weeks.        See below: Comment      Autoantibody                       Disease Association  ------------------------------------------------------------                          Condition                  Frequency  ---------------------   ------------------------   ---------  Antinuclear Antibody,    SLE, mixed connective  Direct (ABI-D)           tissue diseases  ---------------------   ------------------------   ---------  dsDNA                    SLE                        40 - 60%  ---------------------   ------------------------   ---------  Chromatin                Drug induced SLE                90%                           SLE                        48 - 97%  ---------------------   ------------------------   ---------  SSA (Ro)                 SLE                        25 - 35%                           Sjogren's Syndrome         40 - 70%                            Lupus                 100%  ---------------------   ------------------------   ---------  SSB (La)                 SLE                             10%                           Sjogren's Syndrome              30%  ---------------------   -----------------------    ---------  Sm (anti-Smith)          SLE                        15 - 30%  ---------------------   -----------------------    ---------  RNP                      Mixed Connective Tissue                            Disease                         95%  (U1 nRNP,                SLE                        30 - 50%  anti-ribonucleoprotein)  Polymyositis and/or                           Dermatomyositis                 20%  ---------------------   ------------------------   ---------  Scl-70 (antiDNA          Scleroderma (diffuse)      20 - 35%  topoisomerase)           Crest                           13%  ---------------------   ------------------------   ---------  Debbi-1                     Polymyositis and/or                           Dermatomyositis            20 - 40%  ---------------------   ------------------------   ---------  Centromere B             Scleroderma - Crest                           variant                         80%       Narrative:       Performed at:  [address] [place], [place] [id number]  : Jair Saldivar PhD, Phone:  [phone]    POC Glucose Once [[id number]]  (Normal) Collected:  01/25/18 1549    Specimen:  Blood Updated:  01/25/18 1609     Glucose 111 mg/dL     Narrative:       Meter: [id number] : [id number] Kinza Borjas RN    C-reactive Protein [[id number]]  (Abnormal) Collected:  01/25/18 1734    Specimen:  Blood Updated:  01/25/18 1835     C-Reactive Protein 7.78 (H) mg/dL     Sedimentation Rate [[id number]]  (Abnormal) Collected:  01/25/18 1734    Specimen:  Blood Updated:  01/25/18 1843     Sed Rate 78 (H) mm/hr     Blood Gas, Arterial [[id number]]  (Abnormal) Collected:  01/25/18 1931    Specimen:  Arterial Blood Updated:  01/25/18 1935     Site Arterial: left radial     Musa's Test Positive     pH, Arterial 7.367 pH units      pCO2, Arterial 37.1 mm Hg      pO2, Arterial 502.6 (H) mm Hg      HCO3, Arterial 21.3 (L) mmol/L      Base Excess, Arterial -3.6 (L) mmol/L      O2 Saturation Calculated 100.0 (H) %      A-a Gradiant 0.7 mmHg      Barometric Pressure for Blood Gas 759.0 mmHg      Modality Adult Vent     FIO2 100 %      Ventilator Mode AC      Set Tidal Volume 450     Set Mech Resp Rate 16     Rate 16 Breaths/minute      PEEP 5    Narrative:       sat 100% Meter: [id number] : [id number] Rachana Pedroza    POC Glucose Once [[id number]]  (Normal) Collected:  01/25/18 2014    Specimen:  Blood Updated:  01/25/18 2049     Glucose 106 mg/dL     Narrative:       Meter: [id number] : [id number] [place] BASIA    POC Glucose Once [[id number]]  (Abnormal) Collected:  01/26/18 0353    Specimen:  Blood Updated:  01/26/18 0413     Glucose 163 (H) mg/dL     Narrative:       Meter: [id number] : [id number] [place] BASIA    CBC (No Diff) [[id number]]  (Abnormal) Collected:  01/26/18 0507    Specimen:  Blood Updated:  01/26/18 0555     WBC 4.11 (L) 10*3/mm3      RBC 4.10 10*6/mm3      Hemoglobin 9.7 (L) g/dL      Hematocrit 31.3 (L) %      MCV 76.3 (L) fL      MCH 23.7 (L) pg      MCHC 31.0 (L) g/dL      RDW 24.1 (H) %      RDW-SD 65.4 (H) fl      Platelets 66 (L) 10*3/mm3     Basic Metabolic Panel [[id number]]  (Abnormal) Collected:  01/26/18 0507    Specimen:  Blood Updated:  01/26/18 0614     Glucose 152 (H) mg/dL      BUN 43 (H) mg/dL      Creatinine 1.53 (H) mg/dL      Sodium 144 mmol/L      Potassium 4.1 mmol/L      Chloride 111 (H) mmol/L      CO2 17.5 (L) mmol/L      Calcium 8.4 (L) mg/dL      eGFR  African Amer 42 (L) mL/min/1.73      BUN/Creatinine Ratio 28.1 (H)     Anion Gap 15.5 mmol/L     Narrative:       GFR Normal >60  Chronic Kidney Disease <60  Kidney Failure <15    Cryptococcal Antigen [[id number]]  (Normal) Collected:  01/25/18 1734    Specimen:  Blood Updated:  01/26/18 0623     Crypto Ag Negative    POC Glucose Once [[id number]]  (Abnormal) Collected:  01/26/18 0728    Specimen:  Blood Updated:  01/26/18 0746     Glucose 168 (H) mg/dL     Narrative:       Meter: [id number] : [id number] Andre PACHECO    Flow Cytometry, Blood [[id number]] Collected:  01/25/18 0618    Specimen:  Blood Updated:  01/26/18 1049      "Reference Lab Report --    POC Glucose Once [[id number]]  (Abnormal) Collected:  01/26/18 1154    Specimen:  Blood Updated:  01/26/18 1205     Glucose 185 (H) mg/dL     Narrative:       Meter: [id number] : [id number] Andre PACHECO    Immature Platelet Fraction [[id number]]  (Abnormal) Collected:  01/26/18 1119    Specimen:  Blood Updated:  01/26/18 1209     IPF 6.20 %      Platelets 62 (L) 10*3/mm3     ANCA Panel [[id number]]  (Abnormal) Collected:  01/22/18 1633    Specimen:  Blood Updated:  01/26/18 1518     Myeloperoxidase Ab <9.0 U/mL      Antiproteinase 3 (AL-3) <3.5 U/mL      C-ANCA 1:160 (H) titer      P-ANCA <1:20 titer       The presence of positive fluorescence exhibiting P-ANCA or C-ANCA  patterns alone is not specific for the diagnosis of Wegener's  Granulomatosis (WG) or microscopic polyangiitis. Decisions about  treatment should not be based solely on ANCA IFA results.  The  International ANCA Group Consensus recommends follow up testing of  positive sera with both AL-3 and MPO-ANCA enzyme immunoassays. As  many as 5% serum samples are positive only by EIA.  Ref. AM J Clin Pathol 1999;111:507-513.        Atypical pANCA <1:20 titer       The atypical pANCA pattern has been observed in a significant  percentage of patients with ulcerative colitis, primary sclerosing  cholangitis and autoimmune hepatitis.       Narrative:       Performed at:  01 - [facility]  [id number]  : Vikram Gann MD, Phone:  [phone]  Performed at:  02 - [facility]  [id number]  : Jair Saldivar PhD, Phone:  [phone]        /73  Pulse 70  Temp 97.6 °F (36.4 °C) (Oral)   Resp 17  Ht 149.9 cm (59.02\")  Wt 69.7 kg (153 lb 11.2 oz)  SpO2 100%  BMI 31.03 kg/m2    Discharge Exam:  General Appearance:    Sedated on vent, no distress                          Head:    Normocephalic, without obvious abnormality, " atraumatic                          Eyes:                            Throat:   Lips, tongue, gums normal                          Neck:   Supple, symmetrical, trachea midline, no JVD                        Lungs:     Clear to auscultation bilaterally, respirations unlabored                Chest Wall:    No tenderness or deformity                        Heart:    Regular rate and rhythm, S1 and S2 normal, no murmur,no  Rub  or gallop                  Abdomen:     Soft, non-tender, bowel sounds active, no masses, no organomegaly                  Extremities:   Extremities normal, atraumatic, no cyanosis or edema , cellulitis on ankles resolving                            Skin:   Skin is warm and dry,  no rashes or palpable lesions                  Neurologic:   sedated     Disposition:  [facility] to transfer via air ambulance to ICU    Patient Instructions:    Domitila Valera   Home Medication Instructions MICHELLE:[id number]    Printed on:01/26/18 1379   Medication Information                      amLODIPine-valsartan (EXFORGE) 5-320 MG per tablet  Take 1 tablet by mouth Daily.             atenolol-chlorthalidone (TENORETIC) 50-25 MG per tablet  Take 1 tablet by mouth Daily.             cycloSPORINE (RESTASIS) 0.05 % ophthalmic emulsion  Administer 1 drop to both eyes 2 (Two) Times a Day.             estradiol (ESTRACE) 1 MG tablet  Take 1 mg by mouth Daily.             ferrous gluconate 324 (37.5 Fe) MG tablet tablet  Take 324 mg by mouth 2 (Two) Times a Day With Meals.             fluconazole (DIFLUCAN) 200 MG tablet  Take 400 mg by mouth Daily.             Folic Acid-Vit B6-Vit B12 (B COMPLEX-FOLIC ACID PO)  Take 1 capsule by mouth Daily.             gabapentin (NEURONTIN) 100 MG capsule  Take 200 mg by mouth 3 (Three) Times a Day.             HYDROcodone-acetaminophen (NORCO) 5-325 MG per tablet  Take 1.5 tablets by mouth Every 6 (Six) Hours As Needed for Moderate Pain .             predniSONE  (DELTASONE) 10 MG tablet  Take 5 mg by mouth Daily.             temazepam (RESTORIL) 15 MG capsule  Take 15 mg by mouth At Night As Needed for Sleep.             Tofacitinib Citrate (XELJANZ) 5 MG tablet  Take 1 tablet by mouth 2 (Two) Times a Day.             vitamin D (ERGOCALCIFEROL) 79833 units capsule capsule  Take 50,000 Units by mouth 1 (One) Time Per Week.               No future appointments.    Discharge Order     None          Total time spent discharging patient including evaluation,post hospitalization follow up,  medication and post hospitalization instructions and education total time exceeds 30 minutes.    Signed:  Elie Morales MD  1/26/2018  4:08 PM

## 2018-01-26 NOTE — THERAPY EVALUATION
Acute Care - Physical Therapy Initial Evaluation  [facility]     Patient Name: Domitila Valera  : 1955  MRN: [id number]  Today's Date: 2018   Onset of Illness/Injury or Date of Surgery Date: 18     Referring Physician: Ludmila King      Admit Date: 2018     Visit Dx:    ICD-10-CM ICD-9-CM   1. FUO (fever of unknown origin) R50.9 780.60   2. Impaired functional mobility and activity tolerance Z74.09 V49.89     Patient Active Problem List   Diagnosis   • FUO (fever of unknown origin)   • Hyperkalemia   • Benign essential HTN   • Cellulitis of right upper arm   • Hyponatremia   • JEFFERY (acute kidney injury)   • Anemia   • GI bleed   • Cellulitis   • Thrombocytopenia   • Paroxysmal atrial fibrillation   • PNA (pneumonia)   • ANCA-positive vasculitis   • Encephalopathy     Past Medical History:   Diagnosis Date   • Hypertension    • Ischemic colitis    • Rheumatoid arthritis    • Thyroid nodule      Past Surgical History:   Procedure Laterality Date   • ANKLE SURGERY Right    •  SECTION     • HYSTERECTOMY     • JOINT REPLACEMENT Bilateral     knee          PT ASSESSMENT (last 72 hours)      PT Evaluation       18 1546 18 1253    Rehab Evaluation    Document Type evaluation  -PC     Subjective Information unable to respond  -PC     Evaluation Not Performed  other (see comments)   per Augustine Hedrick Pt. is not appropriate for OT today, OT will check on the pt. on Monday   -VS    Patient Effort, Rehab Treatment poor  -PC     Symptoms Noted Comment pt sedated on vent  -PC     General Information    Patient Profile Review yes  -PC     Onset of Illness/Injury or Date of Surgery Date 18  -PC     Referring Physician Ludmila King  -PC     General Observations pt is in bed, intubated on vent, B wrist restraints  -PC     Pertinent History Of Current Problem adm with B LE cellulitis, diff walking, rapidly deteriorated, now in CCU on vent, work up ongoing and plans to transfer to [facility]  in place, pt with hx of RA  -PC     Prior Level of Function independent:;all household mobility;community mobility  -PC     Equipment Currently Used at Home cane, straight  -PC     Plans/Goals Discussed With family  -PC     Barriers to Rehab medically complex  -PC     Clinical Impression    Criteria for Skilled Therapeutic Interventions Met --   was not able to arouse this date, will cont to assess   -PC     Impairments Found (describe specific impairments) aerobic capacity/endurance;gait, locomotion, and balance;joint integrity and mobility  -PC     Rehab Potential fair, will monitor progress closely  -PC     Pain Assessment    Pain Assessment --   grimaced with PROM RLE ,LUE  -PC     Cognitive Assessment/Intervention    Current Cognitive/Communication Assessment could not assess  -PC     ROM (Range of Motion)    General ROM Detail PROM BUE WFL, RLE ankle fused, knee and hip WFL, LLE WFL  -PC     MMT (Manual Muscle Testing)    General MMT Assessment Detail pt did not follow commands or move spontaneously  -PC     Bed Mobility, Assessment/Treatment    Bed Mob, Supine to Sit, [place] not appropriate to assess  -PC     Therapy Exercises    Bilateral Lower Extremities PROM:;10 reps  -PC     Bilateral Upper Extremity PROM:;10 reps  -PC     Positioning and Restraints    Pre-Treatment Position in bed  -PC     Post Treatment Position bed  -PC     In Bed supine;call light within reach;encouraged to call for assist;with family/caregiver  -PC       01/25/18 0930 01/25/18 0916    Rehab Evaluation    Evaluation Not Performed patient unavailable for evaluation;other (see comments)   off floor for lumbar puncture. Will follow up tomorrow for   -SG other (see comments)   Spoke with nursing (Dia). Pt. going for lumbar puncture this day. Will follow up tomorrow.  -MS      01/25/18 0433 01/25/18 0051    Living Environment    Transportation Available car;family or friend will provide  -HL     Muscle Tone Assessment     Muscle Tone Assessment  Bilateral Upper Extremities;Bilateral Lower Extremities  -HL    Bilateral Upper Extremities Muscle Tone Assessment  moderately decreased tone  -HL    Bilateral Lower Extremities Muscle Tone Assessment  moderately decreased tone  -HL      01/24/18 2206 01/24/18 0946    Rehab Evaluation    Evaluation Not Performed  unable to evaluate, medical status change   nursing refuses OT at this time. States possible transfer to ICU. 946  -SG    Muscle Tone Assessment    Muscle Tone Assessment Bilateral Upper Extremities;Bilateral Lower Extremities  -HL     Bilateral Upper Extremities Muscle Tone Assessment moderately decreased tone  -HL     Bilateral Lower Extremities Muscle Tone Assessment moderately decreased tone  -HL       01/24/18 0944 01/24/18 0935    Rehab Evaluation    Evaluation Not Performed other (see comments)  -SV other (see comments)   Discussed POC with RN. Per RN CTA/MRA revealed SAH. Pt likely transferring to ICU. ST to s/o, please reconsult as appropriate. RN in agreement.   -OC      01/24/18 0400 01/24/18 0000    Muscle Tone Assessment    Muscle Tone Assessment Bilateral Upper Extremities;Bilateral Lower Extremities  -TG Bilateral Upper Extremities;Bilateral Lower Extremities  -TG    Bilateral Upper Extremities Muscle Tone Assessment mildly decreased tone  -TG mildly decreased tone  -TG    Bilateral Lower Extremities Muscle Tone Assessment mildly decreased tone  -TG mildly decreased tone  -TG      01/23/18 2130       Muscle Tone Assessment    Muscle Tone Assessment Bilateral Upper Extremities;Bilateral Lower Extremities  -TG     Bilateral Upper Extremities Muscle Tone Assessment mildly decreased tone  -TG     Bilateral Lower Extremities Muscle Tone Assessment mildly decreased tone  -TG       User Key  (r) = Recorded By, (t) = Taken By, (c) = Cosigned By    Initials Name Provider Type    MAGDIEL Maldonado OTR Occupational Therapist    OC Honey Acevedo MA,CCC-SLP Speech and Language  Pathologist    VS Cee Torres, OTR Occupational Therapist    PC Kellen Nguyen, PT Physical Therapist    MS John Tillman, PT Physical Therapist    SV Alpa Galvan, PT Physical Therapist    TG Dana Marie, RN Registered Nurse    BRANDO Pinedo, RN Registered Nurse          Physical Therapy Education     Title: PT OT SLP Therapies (Active)     Topic: Physical Therapy (Active)     Point: Mobility training (Active)    Learning Progress Summary    Learner Readiness Method Response Comment Documented by Status   Patient Nonacceptance E,D NL   01/26/18 1553 Active               Point: Home exercise program (Active)    Learning Progress Summary    Learner Readiness Method Response Comment Documented by Status   Patient Nonacceptance E,D NL   01/26/18 1553 Active                      User Key     Initials Effective Dates Name Provider Type Discipline     12/01/15 -  Kellen Nguyen, PT Physical Therapist PT                PT Recommendation and Plan  Anticipated Discharge Disposition:  ([facility])  Planned Therapy Interventions: bed mobility training, ROM (Range of Motion), strengthening, transfer training  PT Frequency: 2-3 times/wk  Plan of Care Review  Plan Of Care Reviewed With: patient  Outcome Summary/Follow up Plan: pt was sedated on vent at time of visit, PROM performed, will follow for progression of activity as tolerated, pt may be transferring to [facility]          IP PT Goals       01/26/18 1554          Bed Mobility PT LTG    Bed Mobility PT LTG, Date Established 01/26/18  -      Bed Mobility PT LTG, Time to Achieve 1 wk  -PC      Bed Mobility PT LTG, Activity Type supine to sit/sit to supine  -      Bed Mobility PT LTG, [place] Level moderate assist (50% patient effort);2 person assist required  -PC      Static Sitting Balance PT LTG    Static Sitting Balance PT LTG, Date Established 01/26/18  -      Static Sitting Balance PT LTG, Time to Achieve 1 wk  -PC       Static Sitting Balance PT LTG, [place] Level minimum assist (75% patient effort)  -PC      Static Sitting Balance PT LTG, Assist Device UE Support  -PC      Static Sitting Balance PT LTG, Additional Goal 5 minutes  -PC        User Key  (r) = Recorded By, (t) = Taken By, (c) = Cosigned By    Initials Name Provider Type    PC Kellen Nguyen PT Physical Therapist                Outcome Measures       01/26/18 1500          How much help from another person do you currently need...    Turning from your back to your side while in flat bed without using bedrails? 1  -PC      Moving from lying on back to sitting on the side of a flat bed without bedrails? 1  -PC      Moving to and from a bed to a chair (including a wheelchair)? 1  -PC      Standing up from a chair using your arms (e.g., wheelchair, bedside chair)? 1  -PC      Climbing 3-5 steps with a railing? 1  -PC      To walk in hospital room? 1  -PC      AM-PAC 6 Clicks Score 6  -PC      Functional Assessment    Outcome Measure Options AM-PAC 6 Clicks Basic Mobility (PT)  -PC        User Key  (r) = Recorded By, (t) = Taken By, (c) = Cosigned By    Initials Name Provider Type    PC Kellen Nguyen, PT Physical Therapist           Time Calculation:         PT Charges       01/26/18 1557          Time Calculation    Start Time 1530  -PC      Stop Time 1545  -PC      Time Calculation (min) 15 min  -PC      PT Received On 01/26/18  -PC      PT - Next Appointment 01/27/18  -PC      PT Goal Re-Cert Due Date 02/02/18  -PC        User Key  (r) = Recorded By, (t) = Taken By, (c) = Cosigned By    Initials Name Provider Type    PC Kellen Nguyen, MELANIE Physical Therapist          Therapy Charges for Today     Code Description Service Date Service Provider Modifiers Qty    84874907385 HC PT EVAL MOD COMPLEXITY 2 1/26/2018 Kellen Nguyen, PT GP 1    57671366346 HC PT THER PROC EA 15 MIN 1/26/2018 Kellen Nguyen, PT GP 1          PT G-Codes  Outcome Measure Options: AM-PAC 6  Clicks Basic Mobility (PT)      Kellen Nguyen, PT  1/26/2018

## 2018-01-26 NOTE — NURSING NOTE
"   01/26/18 1115   Pressure Ulcer 01/25/18 1600 Left heel suspected deep tissue injury   Date first assessed/Time first assessed: 01/25/18 1600   Present On Admission (Pressure Ulcer): no  Side: Left  Location: heel  Stage: suspected deep tissue injury   Dressing Appearance other (see comments)  (CHRISTIAN)   Pressure Ulcer Appearance purple   Periwound Area dry   Length (Pressure Ulcer) (cm) 2.5   Width (Pressure Ulcer) (cm) 3   Pressure Ulcer Therapeutic Interventions other (see comments)  (OFF LOADING BOOT APPLIED)   Pressure Ulcer 01/26/18 Right lateral other (see comments) unstageable   Date first assessed: 01/26/18   Present On Admission (Pressure Ulcer): no  Side: Right  Orientation: lateral  Location: (c) other (see comments)  Stage: unstageable   Dressing Appearance other (see comments)  (CHRISTIAN)   Pressure Ulcer Appearance black eschar   Periwound Area dry;other (see comments)  (FLAKY, SCALY)   Length (Pressure Ulcer) (cm) 1   Width (Pressure Ulcer) (cm) 0.5   Dressing foam;low-adherent     Consult received for skin assessment on patient with complex medical course.  Son at bedside states transfer to [facility] is likely.  Patient intubated and on ventilator.  Hx of \"severe arthritis\" per son and patient has odd relaxed position.  Body leans to the right, causing pressure to be placed along her right lateral/posterior shin.  Area of dried eschar noted with dryness and flakiness surrounding.  Difficult to adequately off load this area given patient body habitus.  A mepilex would provide additional padding and protection. Left heel with DTI present.  There is no feeling of depth. Off loading boot placed.  Venelex ordered for both of these areas.    Buttocks with shear and friction noted.  No pressure injuries noted.   Discussed with son at bedside importance of pressure reduction.  Explained that given her overall health at this time that skin tends to \"take a hit\" as body uses its resources to provide for " heart, lungs, and kidneys.  Lower extremities often more compromised and he verbalized understanding.    Open areas at corners of mouth-- possible herpes lesions? Discussed with son that keeping these areas moist should feel good to patient and could facilitate healing. If these are herpes lesions, MD should order antiviral if appropriate given patient condition. If available, a topical antiviral could be useful as well.

## 2018-01-26 NOTE — PROGRESS NOTES
LOS: 7 days     Chief Complaint:  Follow-up fever    History from pt and family.    Interval History:  Went to see patient and she was in a rapid response due to hypoxia. Spoke w/ RN Dia who says she was hypoxic not longer after coming back from her LP. Her O2 recovered on high flow nasal cannula. Her mental status remains very poor. Her HSV PCR returned as negative. Her repeat LP was very bloody with 241,000 RBCs and a high protein. The meningitis/encephalitis PCR panel was negative. I am told she is in Afib (did not get to see the EKG) and is definitely tachycardic reviewing the monitor.    ROS: Cannot obtain due to mental status.    Vital Signs  Temp:  [97.4 °F (36.3 °C)-97.9 °F (36.6 °C)] 97.4 °F (36.3 °C)  Heart Rate:  [] 81  Resp:  [16-40] 28  BP: ()/() 130/85  FiO2 (%):  [40 %-100 %] 40 %    Physical Exam:  General: lethargic, acutely and chronically ill appearing   Head: wearing cap, no trauma seen  Eyes:  EOMI, no scleral icterus  ENT: MM dry, cracked lips, OP clear, no thrush. Fair dentition.   Neck: Supple  Cardiovascular: tachycardic, irregular rhythm, no murmurs, rubs, or gallops; 1+ pitting LE edema  Respiratory: Lungs w/ scattered rales, increased work of breathing, on 8 L HFNC  GI: Abdomen is soft, non-tender, non-distended  : no Pineda catheter present  Musculoskeletal: B ankle tenderness and swelling  Skin: thin skin on shins, 1 blister noted, no significant erythema  Neurological: Alert and oriented x0, moves all 4 ext spontaneously  Psychiatric: lethargic, groans with movement  Vasc: no cyanosis; PICC w/o erythema    Meds:    Current Facility-Administered Medications:   •  acetaminophen (TYLENOL) suppository 650 mg, 650 mg, Rectal, Q4H PRN, Raad Adams MD, 650 mg at 01/23/18 2123  •  acetaminophen (TYLENOL) tablet 650 mg, 650 mg, Oral, Q4H PRN **OR** acetaminophen (TYLENOL) suppository 650 mg, 650 mg, Rectal, Q4H PRN, Tricia G [place], APRN, 650 mg at 01/24/18 8366  •   acetaminophen (TYLENOL) tablet 650 mg, 650 mg, Oral, Q6H PRN, Hugo Elam MD, 650 mg at 01/22/18 2051  •  amLODIPine (NORVASC) tablet 5 mg, 5 mg, Oral, Q24H, Elie Morales MD, 5 mg at 01/26/18 0825  •  cycloSPORINE (RESTASIS) 0.05 % ophthalmic emulsion 1 drop, 1 drop, Both Eyes, BID, Hugo Elam MD, 1 drop at 01/26/18 0826  •  dextrose (D50W) solution 50 mL, 50 mL, Intravenous, Q1H PRN, Vinayak Feliciano MD, 50 mL at 01/20/18 0003  •  diltiaZEM (CARDIZEM) 100 mg/100 mL (1 mg/mL) 0.9% NS, 5-15 mg/hr, Intravenous, Continuous, Sarbjit Manzanares MD, Last Rate: 5 mL/hr at 01/25/18 1433, 5 mg/hr at 01/25/18 1433  •  fentaNYL citrate (PF) (SUBLIMAZE) injection 25 mcg, 25 mcg, Intravenous, Q1H PRN, Claudette Villegas MD, 25 mcg at 01/25/18 2349  •  gabapentin (NEURONTIN) 250 MG/5ML solution 200 mg, 200 mg, Oral, Q12H, Rusty Toussaint MD, 200 mg at 01/26/18 0825  •  Hold medication, 1 each, Does not apply, Continuous PRN, Enmanuel Welch MD  •  hydrALAZINE (APRESOLINE) injection 10 mg, 10 mg, Intravenous, Q6H PRN, Elie Morales MD  •  HYDROcodone-acetaminophen (NORCO) 5-325 MG per tablet 1 tablet, 1 tablet, Oral, Q6H PRN, Hugo Elam MD, 1 tablet at 01/22/18 0625  •  methylPREDNISolone sod succ 1 g/100 mL 0.9% NS VTB (mbp), 1,000 mg, Intravenous, Q24H, Enmanuel Welch MD, 1 g at 01/25/18 1824  •  metoprolol tartrate (LOPRESSOR) injection 5 mg, 5 mg, Intravenous, Q6H PRN, Elie Morales MD, 5 mg at 01/25/18 1231  •  ondansetron (ZOFRAN) injection 4 mg, 4 mg, Intravenous, Q6H PRN, RAMANDEEP Pedraza  •  pantoprazole (PROTONIX) injection 40 mg, 40 mg, Intravenous, BID AC, Elie Morales MD, 40 mg at 01/26/18 0632  •  piperacillin-tazobactam (ZOSYN) 3.375 g in iso-osmotic dextrose 50 ml (premix), 3.375 g, Intravenous, Q8H, Dylon Andrews MD, 3.375 g at 01/26/18 0106  •  potassium chloride (MICRO-K) CR capsule 40 mEq, 40 mEq, Oral, PRN **OR** potassium chloride (KLOR-CON) packet 40 mEq, 40  mEq, Oral, PRN **OR** potassium chloride 10 mEq in 100 mL IVPB, 10 mEq, Intravenous, Q1H PRN, Elie Morales MD, Last Rate: 100 mL/hr at 01/25/18 1415, 10 mEq at 01/25/18 1415  •  potassium chloride (MICRO-K) CR capsule 40 mEq, 40 mEq, Oral, Once, Kip Roberson MD  •  propofol (DIPRIVAN) infusion 10 mg/mL 100 mL, 5-50 mcg/kg/min, Intravenous, Titrated, Claudette Villegas MD, Last Rate: 14.64 mL/hr at 01/26/18 0523, 35 mcg/kg/min at 01/26/18 0523  •  sodium chloride 0.9 % flush 1-10 mL, 1-10 mL, Intravenous, PRN, Tricia Albarran, APRN  •  sodium chloride 0.9 % flush 10 mL, 10 mL, Intracatheter, Q12H, Raad Adams MD, 10 mL at 01/26/18 0827  •  sodium chloride 0.9 % flush 10 mL, 10 mL, Intracatheter, PRN, Raad Adams MD  •  sodium chloride 0.9 % infusion, 100 mL/hr, Intravenous, Continuous, Kip Roberson MD, Last Rate: 100 mL/hr at 01/26/18 0954, 100 mL/hr at 01/26/18 0954  •  temazepam (RESTORIL) capsule 15 mg, 15 mg, Oral, Nightly PRN, Hugo Elam MD, 15 mg at 01/22/18 2051    LABS:    Lab Results   Component Value Date    WBC 4.11 (L) 01/26/2018    HGB 9.7 (L) 01/26/2018    HCT 31.3 (L) 01/26/2018    MCV 76.3 (L) 01/26/2018    PLT 66 (L) 01/26/2018     Lab Results   Component Value Date    GLUCOSE 152 (H) 01/26/2018    BUN 43 (H) 01/26/2018    CREATININE 1.53 (H) 01/26/2018    EGFRIFAFRI 42 (L) 01/26/2018    BCR 28.1 (H) 01/26/2018    CO2 17.5 (L) 01/26/2018    CALCIUM 8.4 (L) 01/26/2018    ALBUMIN 1.70 (L) 01/25/2018    LABIL2 0.4 01/25/2018    AST 55 (H) 01/25/2018    ALT 14 01/25/2018    CRP 7.78 (H) 01/25/2018       Cortisol 7  Procal 1-->0.9  Urine Histo Ag negative  HIV negative  Hep C negative  RPR non-reactive  ABI pending  ANCA ++  RF 42  Ferritin 1812    LP Results:  11 WBCs (86% lymphs)  1640 RBCs  Pro 48  Glc 45  Gram stain and culture negative  HSV 1/2 PCR negative    LP #2 results:  241,000 RBCs  123 WBCs (44% PMNs, 51% Lymphs)  Pro 436  Glc 79  Meningitis/Encephalitis  panel negative    Microbiology:  1/19 BCx: negative  1/20 RVP: negative  1/21 SpCx: negative  1/21 CSF Cx: negative    Radiology (personally reviewed):   CXR reviewed; small Bpleural effusions    Assessment/Plan   1. Fever of unknown origin  2. Encephalitis  3. B ankle pain and swelling  4. Rheumatoid arthritis  5. History of ischemic colitis  6. Acute hypoxic respiratory failure  7. ? L lingular infiltrate  8. Pancytopenia    Fevers are resolved. Repeat LP with very high number of RBCs and protein. The high number of RBCs negates the WBCs and there does not appear to be evidence of infection. HSV PCR now negative x 2 spaced 3-4 days apart strongly argues against HSV encephalitis so I stopped IV acyclovir. I added on some additional CNS tests (JOHN virus, VDRL, cytology, Histo Ag, Crypto) but there was not enough CSF to run any of these tests.     Her hypoxia may be related to some aspiration but also was not protecting her airway well due to lethargy. I'll changed the cefepime to Zosyn since I'm less concerned about CNS penetration at this point.    There has been discussion of a cerebral angiogram and I think that is a very good next test. Since we have no clear evidence of infection, I am not opposed to a trial of steroids especially with her positive ANCA.    Her rheumatologist has been contacted and second-hand I have heard that there is very low suspicion for RA manifesting as CNS disease.     There was mild lymphadenopathy on her scan. LDH slightly elevated. Flow cytometry is pending. Pancytopenia is noted.    Noted plans to transfer to [facility]. ID will be available as needed.    Case d/w Dr Villegas.

## 2018-01-26 NOTE — PROGRESS NOTES
Continued Stay Note  [facility]     Patient Name: Domitila Valera  MRN: [id number]  Today's Date: 1/26/2018    Admit Date: 1/19/2018          Discharge Plan       01/26/18 1457    Case Management/Social Work Plan    Plan [facility] when MICU bed is available    Additional Comments Call received from Ayana goodman [employer].  H&P and insurance information faxed to them at [phone].  Once insurance has been verified, they will fax the necessary forms to CC[facility] for completion.  They are then responsible for organizing with the [facility] transport team to set up timing and ground transportation arrangements.   There is no bed assigned as of yet.        01/26/18 1215    Case Management/Social Work Plan    Additional Comments Dr. Villegas has now spoken to and has an accepting MD at [facility].  Call placed to the Transfer Center to check the status of the transfer.  [facility] stated that they are waiting on a medical ICU bed to open.  After the bed is assigned, they will arrange the critical care transport team.  Await bed assignment.              Discharge Codes     None            Emely Franks RN

## 2018-01-26 NOTE — CONSULTS
"Adult Nutrition  Assessment/PES    Patient Name:  Domitila Valera  YOB: 1955  MRN: [id number]  Admit Date:  1/19/2018    Assessment Date:  1/26/2018    Comments:  TF's orded for Osmlite 1.2 at 50ml/hr.  Will continue to follow.          Reason for Assessment       01/26/18 1331    Reason for Assessment    Reason For Assessment/Visit physician consult;TF/PN    Pulmonary/Critical Care Acute respiratory failure    Renal JEFFERY                Anthropometrics       01/26/18 1332    Anthropometrics    Height 149.9 cm (59.02\")    RD Documented Current Weight  69.7 kg (153 lb 11.2 oz)    Ideal Body Weight (IBW)    Ideal Body Weight (IBW), Female 44.01    Body Mass Index (BMI)    BMI Grade 30 - 34.9- obesity - grade I            Labs/Tests/Procedures/Meds       01/26/18 1333    Labs/Tests/Procedures/Meds    Diagnostic Test/Procedure Review reviewed    Labs/Tests Review Reviewed;Glucose;Platlets;BUN;Creat;Hgb Hct    Medication Review Reviewed, pertinent;Steroid;Antibiotic;Propofol/Diprivan    Significant Vitals reviewed            Physical Findings       01/26/18 1334    Physical Findings/Assessment    Additional Documentation Physical Appearance (Group)    Physical Appearance    Overall Physical Appearance on ventilator support    Gastrointestinal diarrhea;feeding tube    Tubes orogastric tube    Skin other (see comments);cellulitis;pressure ulcer(s)   puncture, skin tear, right lateral shin PU, left heel suspect DTI            Estimated/Assessed Needs       01/26/18 1335    Calculation Measurements    Weight Used For Calculations 69.7 kg (153 lb 10.6 oz)    Estimated/Assessed Energy Needs    Energy Need Method Kcal/kg    kcal/kg 20    20 Kcal/Kg (kcal) 1394    Estimated Kcal Range  9644-2924    Estimated/Assessed Protein Needs    Weight Used for Protein Calculation 69.7 kg (153 lb 10.6 oz)    Protein (gm/kg) 1.0    1.0 Gm Protein (gm) 69.7            Nutrition Prescription Ordered       01/26/18 1336    Nutrition " Prescription PO    Current PO Diet NPO    Nutrition Prescription EN    Enteral Route OG    Product Osmolite 1.2 srinivasan    TF Delivery Method Continuous    Continuous TF Goal Rate (mL/hr) 50 mL/hr    Water flush (mL)  100 mL    Water Flush Frequency --   q6            Evaluation of Received Nutrient/Fluid Intake       01/26/18 1338    Evaluation of Received Nutrient/Fluid Intake    Nutrition Delivered Calorie Evaluation;Protein Evaluation;Fluid Evaluation    Calorie Intake Evaluation    Enteral Calories (kcal) 1440    Total Calories (kcal) 1440    % Kcal Needs 100    Protein Intake Evaluation    Enteral Protein (gm) 66    Total Protein (gm) 66    % Protein Needs 95    Fluid Intake Evaluation    Enteral  Fluid (mL) 984    Total Fluid Intake (mL) 984    EN Evaluation    HOB Greater than or equal to 30 degress            Problem/Interventions:        Problem 1       01/26/18 1341    Nutrition Diagnoses Problem 1    Problem 1 Needs Alternate Route    Etiology (related to) Medical Diagnosis    Pulmonary/Critical Care Acute respiratory failure;Ventilator                    Intervention Goal       01/26/18 1341    Intervention Goal    General Maintain nutrition    TF/PN Tolerate TF at goal    Transition TF to PO    Weight No significant weight loss            Nutrition Intervention       01/26/18 1342    Nutrition Intervention    RD/Tech Action Follow Tx progress;Care plan reviewd              Education/Evaluation       01/26/18 1342    Education    Education Education not appropriate at this time    Please explain Patient intubated    Monitor/Evaluation    Monitor Per protocol        Electronically signed by:  Gabby Tolbert RD  01/26/18 1:48 PM

## 2018-01-26 NOTE — PROGRESS NOTES
"   LOS: 7 days   Patient Care Team:  Javier Soriano III, MD as PCP - General (Internal Medicine)    Chief Complaint/ Reason for encounter: Acute renal failure, hypokalemia        Subjective     Fever    Pertinent negatives include no chest pain or nausea.   Foot Pain   Associated symptoms include weakness. Pertinent negatives include no chest pain, fever or nausea.       Subjective:  Symptoms:  Worsening.  She reports weakness.  No shortness of breath or chest pain.  (Remains confused, low-grade fevers occasionally  Transferred to the ICU with respiratory failure, atrial fibrillation.  She's been intubated and placed on mechanical ventilation).    Diet:  Poor intake.  No nausea.    Activity level: Impaired due to weakness.    Pain:  She reports no pain.          History taken from: Patient and chart    Objective     Vital Signs  Temp:  [97.4 °F (36.3 °C)-97.9 °F (36.6 °C)] 97.4 °F (36.3 °C)  Heart Rate:  [] 79  Resp:  [17-40] 17  BP: ()/() 130/85  FiO2 (%):  [40 %-100 %] 40 %       Wt Readings from Last 1 Encounters:   01/25/18 0500 69.7 kg (153 lb 11.2 oz)   01/24/18 0500 67.6 kg (149 lb)   01/23/18 0537 65 kg (143 lb 4.8 oz)   01/22/18 0500 61.7 kg (136 lb)   01/21/18 0737 63.3 kg (139 lb 8 oz)   01/20/18 0546 63.1 kg (139 lb 1.6 oz)   01/19/18 2243 61.6 kg (135 lb 14.4 oz)   01/19/18 1351 57.6 kg (127 lb)       Objective:  General Appearance:  Comfortable, ill-appearing, in no acute distress and not in pain (Thin, mildly cachectic, confused).    Vital signs: (most recent): Blood pressure 130/85, pulse 79, temperature 97.4 °F (36.3 °C), temperature source Oral, resp. rate 17, height 149.9 cm (59\"), weight 69.7 kg (153 lb 11.2 oz), SpO2 100 %.  Vital signs are normal.  No fever.  (Hypotensive, irregular heart rate).    Output: Producing urine.    HEENT: Normal HEENT exam.    Lungs:  Normal respiratory rate and normal effort.  She is not in respiratory distress.  Breath sounds clear to " auscultation.  There are decreased breath sounds.  No wheezes or rales.    Heart: Tachycardia.  Irregular rhythm.    Abdomen: Abdomen is soft and non-distended.  Bowel sounds are normal.   There is no abdominal tenderness.  There is no epigastric area or no suprapubic area tenderness.  There is no rebound tenderness.   There is no mass.   Extremities: Normal range of motion.  There is no deformity or dependent edema.    Pulses: Distal pulses are intact.    Skin:  Warm and dry.  There is a rash.  No ecchymosis or ulceration.             Results Review:    Past Medical History: reviewed and updated  Past Medical History:   Diagnosis Date   • Hypertension    • Ischemic colitis    • Rheumatoid arthritis    • Thyroid nodule          Allergies:  Allergies   Allergen Reactions   • Buffered Aspirin Rash and Shortness Of Breath   • Ace Inhibitors      cough   • Aurothioglucose Diarrhea     Increased AST & ALT   • Latex Itching   • Keflex [Cephalexin] Hives and Rash     Tolerated cephalosporins 1/2018       Intake/Output:     Intake/Output Summary (Last 24 hours) at 01/26/18 1131  Last data filed at 01/26/18 0801   Gross per 24 hour   Intake          2794.94 ml   Output                0 ml   Net          2794.94 ml         DATA:  Interval chart, labs and notes reviewed.  The following labs personally reviewed by me.  Renal ultrasound showed no obstruction  I personally reviewed her old records from [facility], baseline creatinine 1.4  Discussed with her son at bedside, interval history obtained from him as well regarding recent health and complaints  Very complex care, high-level medical complexity given multisystem organ failure, multiple subspecialty consultants, complexity of data to review  worsening renal failure and new respiratory failure today      Labs:   Recent Results (from the past 24 hour(s))   POC Glucose Once    Collection Time: 01/25/18  1:56 PM   Result Value Ref Range    Glucose 92 70 - 130 mg/dL   Blood Gas,  Arterial    Collection Time: 01/25/18  2:05 PM   Result Value Ref Range    Site Arterial: right radial     Musa's Test Positive     pH, Arterial 7.431 7.350 - 7.450 pH units    pCO2, Arterial 28.7 (L) 35.0 - 45.0 mm Hg    pO2, Arterial 81.9 80.0 - 100.0 mm Hg    HCO3, Arterial 19.1 (L) 22.0 - 28.0 mmol/L    Base Excess, Arterial -4.3 (L) 0.0 - 2.0 mmol/L    O2 Saturation Calculated 96.5 92.0 - 99.0 %    Barometric Pressure for Blood Gas 759.6 mmHg    Modality HFNC     Flow Rate 15 lpm    Set Mech Resp Rate 32     Rate 32 Breaths/minute   POC Glucose Once    Collection Time: 01/25/18  3:49 PM   Result Value Ref Range    Glucose 111 70 - 130 mg/dL   Sedimentation Rate    Collection Time: 01/25/18  5:34 PM   Result Value Ref Range    Sed Rate 78 (H) 0 - 30 mm/hr   C-reactive Protein    Collection Time: 01/25/18  5:34 PM   Result Value Ref Range    C-Reactive Protein 7.78 (H) 0.00 - 0.50 mg/dL   Cryptococcal Antigen    Collection Time: 01/25/18  5:34 PM   Result Value Ref Range    Crypto Ag Negative Negative   Prepare RBC, 1 Units    Collection Time: 01/25/18  6:00 PM   Result Value Ref Range    Product Code K5849W44     Unit Number [id number]     UNIT  ABO O     UNIT  RH POS     Dispense Status PT     Blood Type OPOS     Blood Expiration Date [id number]     Blood Type Barcode 5100    Blood Gas, Arterial    Collection Time: 01/25/18  7:31 PM   Result Value Ref Range    Site Arterial: left radial     Musa's Test Positive     pH, Arterial 7.367 7.350 - 7.450 pH units    pCO2, Arterial 37.1 35.0 - 45.0 mm Hg    pO2, Arterial 502.6 (H) 80.0 - 100.0 mm Hg    HCO3, Arterial 21.3 (L) 22.0 - 28.0 mmol/L    Base Excess, Arterial -3.6 (L) 0.0 - 2.0 mmol/L    O2 Saturation Calculated 100.0 (H) 92.0 - 99.0 %    A-a Gradiant 0.7 mmHg    Barometric Pressure for Blood Gas 759.0 mmHg    Modality Adult Vent     FIO2 100 %    Ventilator Mode AC     Set Tidal Volume 450     Set Mech Resp Rate 16     Rate 16 Breaths/minute    PEEP  5    POC Glucose Once    Collection Time: 01/25/18  8:14 PM   Result Value Ref Range    Glucose 106 70 - 130 mg/dL   POC Glucose Once    Collection Time: 01/26/18  3:53 AM   Result Value Ref Range    Glucose 163 (H) 70 - 130 mg/dL   CBC (No Diff)    Collection Time: 01/26/18  5:07 AM   Result Value Ref Range    WBC 4.11 (L) 4.50 - 10.70 10*3/mm3    RBC 4.10 3.90 - 5.20 10*6/mm3    Hemoglobin 9.7 (L) 11.9 - 15.5 g/dL    Hematocrit 31.3 (L) 35.6 - 45.5 %    MCV 76.3 (L) 80.5 - 98.2 fL    MCH 23.7 (L) 26.9 - 32.0 pg    MCHC 31.0 (L) 32.4 - 36.3 g/dL    RDW 24.1 (H) 11.7 - 13.0 %    RDW-SD 65.4 (H) 37.0 - 54.0 fl    Platelets 66 (L) 140 - 500 10*3/mm3   Basic Metabolic Panel    Collection Time: 01/26/18  5:07 AM   Result Value Ref Range    Glucose 152 (H) 65 - 99 mg/dL    BUN 43 (H) 8 - 23 mg/dL    Creatinine 1.53 (H) 0.57 - 1.00 mg/dL    Sodium 144 136 - 145 mmol/L    Potassium 4.1 3.5 - 5.2 mmol/L    Chloride 111 (H) 98 - 107 mmol/L    CO2 17.5 (L) 22.0 - 29.0 mmol/L    Calcium 8.4 (L) 8.6 - 10.5 mg/dL    eGFR  African Amer 42 (L) >60 mL/min/1.73    BUN/Creatinine Ratio 28.1 (H) 7.0 - 25.0    Anion Gap 15.5 mmol/L   POC Glucose Once    Collection Time: 01/26/18  7:28 AM   Result Value Ref Range    Glucose 168 (H) 70 - 130 mg/dL       Radiology:  Imaging Results (last 24 hours)     Procedure Component Value Units Date/Time    IR Lumbar Puncture Diagnosis [[id number]] Collected:  01/25/18 1129     Updated:  01/25/18 1150    Narrative:       FLUOROSCOPIC GUIDED LUMBAR PUNCTURE  - 01/25/2018.     HISTORY: Altered mental status.     After signed informed consent was obtained, the patient was prepped and  draped in the prone position. Lidocaine was used for local anesthesia.     An 18-gauge needle was introduced into the thecal sac at a midlumbar  level by Dr. Tejeda. Only a very tiny amount of slightly bloody CSF  was visualized. The needle was repositioned into the thecal sac and  still no significant fluid could be  returned. Two additional upper to  mid lumbar levels were attempted and only approximately 1 mL to 2 mL of  slightly bloody CSF could be sampled.     Opening pressure is less than 1 cm to 2 cm.       Impression:       Limited CSF could be sampled during the lumbar puncture as  discussed above. Findings were discussed with Dr. Welch.      Fluoroscopy time 4 minutes 39 seconds. 4 images.        XR Chest 1 View [[id number]] Collected:  01/25/18 1832     Updated:  01/25/18 1837    Narrative:       PORTABLE CHEST 1/25/2018 AT 1820 HOURS.     CLINICAL HISTORY: Evaluate ET tube placement.     Compared to the chest x-ray performed earlier today 1415 hours.     In the interval, an endotracheal tube has been inserted and appears in  satisfactory position with its tip couple centimeters above the mary.  A PICC line in the right upper extremity remains in satisfactory  position without change. The lungs are better inflated. There is minimal  increased density in the lingula of the left upper lobe producing  partial obscuration of the left heart border that appears unchanged. The  right lung remains clear. The heart is normal in size.     This report was finalized on 1/25/2018 6:34 PM by Dr. Tim Christie MD.       XR Chest 1 View [[id number]] Collected:  01/25/18 1455     Updated:  01/25/18 2118    Narrative:       PORTABLE CHEST X-RAY     HISTORY: Shortness of breath with exertion.     Portable chest x-ray is provided and correlated with CT chest dated  01/20/2018 and chest x-ray 07/25/2013.     FINDINGS: There is a right PICC line with its tip in the mid to lower  portion of the superior vena cava. The cardiomediastinal silhouette  appears normal. Vascular volume is normal. There may be tiny pleural  effusions blunting the costophrenic angles. The lungs appear clear  except for some subtle increased density laterally in the left lung base  which corresponds to the infiltrate seen in the lingula on the recent  CT. There is  no pneumothorax. Advanced degenerative change is observed  at each shoulder. There also appear to be chronic full-thickness rotator  cuff tears.       Impression:       PICC line has its tip in the superior vena cava. Some subtle  density in the lingula corresponds to infiltrate seen in that area on CT  5 days ago. The lungs are otherwise clear. There appear to be small  pleural effusions bilaterally.     This report was finalized on 1/25/2018 9:15 PM by Dr. Raad Toth MD.       XR Abdomen KUB [[id number]] Collected:  01/25/18 2322     Updated:  01/25/18 2322    Narrative:       X-RAY ABDOMEN ONE VIEW KUB.     HISTORY:  Cortrak placement.     COMPARISON:  No prior studies for comparison.     FINDINGS:   Bowel gas pattern is unremarkable. No abnormal calcifications are seen.     No free air in the abdomen. Tip of the Cortrak tube is in the stomach.       Impression:       No acute findings in the abdomen. Tip of the Cortrak tube is in the  stomach.                      Medications have been reviewed:  Current Facility-Administered Medications   Medication Dose Route Frequency Provider Last Rate Last Dose   • acetaminophen (TYLENOL) suppository 650 mg  650 mg Rectal Q4H PRN Raad Adams MD   650 mg at 01/23/18 2123   • acetaminophen (TYLENOL) tablet 650 mg  650 mg Oral Q4H PRN RAMANDEEP Pedraza        Or   • acetaminophen (TYLENOL) suppository 650 mg  650 mg Rectal Q4H PRN RAMANDEEP Pedraza   650 mg at 01/24/18 1857   • acetaminophen (TYLENOL) tablet 650 mg  650 mg Oral Q6H PRN Hugo Elam MD   650 mg at 01/22/18 2051   • amLODIPine (NORVASC) tablet 5 mg  5 mg Oral Q24H Elie Morales MD   5 mg at 01/26/18 0825   • cycloSPORINE (RESTASIS) 0.05 % ophthalmic emulsion 1 drop  1 drop Both Eyes BID Hugo Elam MD   1 drop at 01/26/18 0826   • dextrose (D50W) solution 50 mL  50 mL Intravenous Q1H PRN Vinayak Feliciano MD   50 mL at 01/20/18 0003   • diltiaZEM (CARDIZEM) 100 mg/100 mL (1 mg/mL) 0.9%  NS  5-15 mg/hr Intravenous Continuous Sarbjit Manzanares MD 5 mL/hr at 01/25/18 1433 5 mg/hr at 01/25/18 1433   • fentaNYL citrate (PF) (SUBLIMAZE) injection 25 mcg  25 mcg Intravenous Q1H PRN Claudette Villegas MD   25 mcg at 01/25/18 2349   • gabapentin (NEURONTIN) 250 MG/5ML solution 200 mg  200 mg Oral Q12H Rusty Toussaint MD   200 mg at 01/26/18 0825   • Hold medication  1 each Does not apply Continuous PRN Enmanuel Welch MD       • hydrALAZINE (APRESOLINE) injection 10 mg  10 mg Intravenous Q6H PRN Elie Morales MD       • HYDROcodone-acetaminophen (NORCO) 5-325 MG per tablet 1 tablet  1 tablet Oral Q6H PRN Hugo Elam MD   1 tablet at 01/22/18 0625   • methylPREDNISolone sod succ 1 g/100 mL 0.9% NS VTB (mbp)  1,000 mg Intravenous Q24H Enmanuel Welch MD   1 g at 01/25/18 1824   • metoprolol tartrate (LOPRESSOR) injection 5 mg  5 mg Intravenous Q6H PRN Elie Morales MD   5 mg at 01/25/18 1231   • ondansetron (ZOFRAN) injection 4 mg  4 mg Intravenous Q6H PRN RAMANDEEP Pedraza       • pantoprazole (PROTONIX) injection 40 mg  40 mg Intravenous BID AC Elie Morales MD   40 mg at 01/26/18 0632   • piperacillin-tazobactam (ZOSYN) 3.375 g in iso-osmotic dextrose 50 ml (premix)  3.375 g Intravenous Q8H Dylon Andrews MD   3.375 g at 01/26/18 0446   • potassium chloride (MICRO-K) CR capsule 40 mEq  40 mEq Oral PRN Elie Morales MD        Or   • potassium chloride (KLOR-CON) packet 40 mEq  40 mEq Oral PRN Elie Morales MD        Or   • potassium chloride 10 mEq in 100 mL IVPB  10 mEq Intravenous Q1H PRN Elie Morales  mL/hr at 01/25/18 1415 10 mEq at 01/25/18 1415   • potassium chloride (MICRO-K) CR capsule 40 mEq  40 mEq Oral Once Kip Roberson MD       • propofol (DIPRIVAN) infusion 10 mg/mL 100 mL  5-50 mcg/kg/min Intravenous Titrated Claudette Villegas MD   Stopped at 01/26/18 1028   • sodium chloride 0.9 % flush 1-10 mL  1-10 mL Intravenous PRN RAMANDEEP Pedraza       •  sodium chloride 0.9 % flush 10 mL  10 mL Intracatheter Q12H Raad Adams MD   10 mL at 01/26/18 0827   • sodium chloride 0.9 % flush 10 mL  10 mL Intracatheter PRN Raad Adams MD       • sodium chloride 0.9 % infusion  100 mL/hr Intravenous Continuous Kip Roberson  mL/hr at 01/26/18 0954 100 mL/hr at 01/26/18 0954   • temazepam (RESTORIL) capsule 15 mg  15 mg Oral Nightly PRN Hugo Elam MD   15 mg at 01/22/18 2051       Assessment/Plan     Principal Problem:    FUO (fever of unknown origin)  Active Problems:    Hyperkalemia    Hyponatremia    JEFFERY (acute kidney injury)    Anemia    GI bleed    Cellulitis    Thrombocytopenia    Paroxysmal atrial fibrillation      Assessment:  (Assesment  JEFFERY , initially due to volume patient, had resolved and creatinine was 1.1.  Worse today, no obvious cause but creatinine is 1.5, previous baseline around 1.4    Chronic kidney disease, recent baseline creatinine around 1.4, per old records     HYperkalemia , improved, now low, replace and per protocol     Hyponatremia , hypovolemic,     New, acute hypoxemic respiratory failure    Worsening the age fibrillation, on diltiazem drip     LE cellulitis     Metabolic acidosis, improved after bicarbonate drip     RA     HTN         Plan:  Renal function a bit worse today, likely secondary to hemodynamic instability from A. Fib  Currently intubated and on mechanical ventilation  Continue IV fluids, increased to 100 cc/h  Julian Pineda to accurately measure urine output, check additional urine studies  Initial urinalysis was fairly bland, if she does have a CNS vasculitis there is no evidence of any renal involvement  Platelets have been low this admission but are dropping, will check a peripheral smear to look for schistocytes  Her initial peripheral smear did not show any schistocytes, very unlikely to be TTP given the absence of renal failure (creatinine has been at or below baseline for the majority of this  admission)  Hematology consult was requested this morning, await their input).             Continue to monitor renal function, electroytes and volume closely   Please call me with any questions or concerns      Kip Roberson MD   [facility]   [phone]    01/26/18  11:31 AM      Dictation performed using Dragon dictation software

## 2018-01-26 NOTE — PLAN OF CARE
Problem: Patient Care Overview (Adult)  Goal: Plan of Care Review  Outcome: Ongoing (interventions implemented as appropriate)   01/26/18 0531   Coping/Psychosocial Response Interventions   Plan Of Care Reviewed With patient   Outcome Evaluation   Outcome Summary/Follow up Plan picked up patient at 0100. In report, pt was intubated at shift change. Still intubated and on propofol gtt, titrating down. HR changed to Afib, started on coumadin gtt at 5, HR now in sinus controlled 70-80's. Cortrak placed around 0000, tube feeds started. Restraints on. Pt withdraws to pain. Bladders scanned at 0400, 132mL. Pt is very edematous. Waiting for lab results. Long conversation with son about pt's condition and purpose of possible transfer to [facility]. Needs reinforcement. Will continue to monitor       Problem: Infection, Risk/Actual (Adult)  Goal: Identify Related Risk Factors and Signs and Symptoms  Outcome: Outcome(s) achieved Date Met: 01/26/18    Goal: Infection Prevention/Resolution  Outcome: Ongoing (interventions implemented as appropriate)      Problem: Fall Risk (Adult)  Goal: Absence of Falls  Outcome: Ongoing (interventions implemented as appropriate)      Problem: Pain, Acute (Adult)  Goal: Acceptable Pain Control/Comfort Level  Outcome: Ongoing (interventions implemented as appropriate)      Problem: Pressure Ulcer Risk (Checo Scale) (Adult,Obstetrics,Pediatric)  Goal: Skin Integrity  Outcome: Ongoing (interventions implemented as appropriate)      Problem: Skin Integrity Impairment, Risk/Actual (Adult)  Goal: Identify Related Risk Factors and Signs and Symptoms  Outcome: Outcome(s) achieved Date Met: 01/26/18    Goal: Skin Integrity/Wound Healing  Outcome: Ongoing (interventions implemented as appropriate)    Goal: Identify Related Risk Factors and Signs and Symptoms  Outcome: Outcome(s) achieved Date Met: 01/26/18    Goal: Skin Integrity/Wound Healing  Outcome: Ongoing (interventions implemented as  appropriate)      Problem: Confusion, Acute (Adult)  Goal: Identify Related Risk Factors and Signs and Symptoms  Outcome: Outcome(s) achieved Date Met: 01/26/18    Goal: Cognitive/Functional Impairments Minimized  Outcome: Ongoing (interventions implemented as appropriate)    Goal: Safety  Outcome: Ongoing (interventions implemented as appropriate)      Problem: SAFETY - NON-VIOLENT RESTRAINT  Goal: Remains free of injury from restraints (Non-Violent Restraint)  Outcome: Ongoing (interventions implemented as appropriate)    Goal: Free from restraint(s) (Non-Violent Restraint)  Outcome: Ongoing (interventions implemented as appropriate)      Problem: Respiratory Insufficiency (Adult)  Goal: Identify Related Risk Factors and Signs and Symptoms  Outcome: Outcome(s) achieved Date Met: 01/26/18    Goal: Acid/Base Balance  Outcome: Ongoing (interventions implemented as appropriate)    Goal: Effective Ventilation  Outcome: Ongoing (interventions implemented as appropriate)

## 2018-01-26 NOTE — PROGRESS NOTES
"DAILY PROGRESS NOTE  [facility]    Patient Identification:  Name: Domitila Valera  Age: 62 y.o.  Sex: female  :  1955  MRN: [id number]         Primary Care Physician: Javier Soriano III, MD    Subjective:  Interval History:She sedated on vent.     Objective:    Scheduled Meds:    amLODIPine 5 mg Oral Q24H   castor oil-balsam peru  Topical Q12H   cycloSPORINE 1 drop Both Eyes BID   gabapentin 200 mg Oral Q12H   lansoprazole 30 mg Nasogastric Q12H   methylPREDNISolone sodium succinate 1,000 mg Intravenous Q24H   piperacillin-tazobactam 3.375 g Intravenous Q8H   potassium chloride 40 mEq Oral Once   sodium chloride 10 mL Intracatheter Q12H     Continuous Infusions:    diltiaZEM 5-15 mg/hr Last Rate: Stopped (18 1100)   hold 1 each    propofol 5-50 mcg/kg/min Last Rate: 45 mcg/kg/min (18 1419)   sodium chloride 100 mL/hr Last Rate: 100 mL/hr (18 0954)       Vital signs in last 24 hours:  Temp:  [97.4 °F (36.3 °C)-97.9 °F (36.6 °C)] 97.6 °F (36.4 °C)  Heart Rate:  [] 70  Resp:  [17-28] 17  BP: ()/() 110/73  FiO2 (%):  [40 %-100 %] 40 %    Intake/Output:    Intake/Output Summary (Last 24 hours) at 18 1555  Last data filed at 18 1200   Gross per 24 hour   Intake          2824.94 ml   Output                0 ml   Net          2824.94 ml       Exam:  /73  Pulse 70  Temp 97.6 °F (36.4 °C) (Oral)   Resp 17  Ht 149.9 cm (59.02\")  Wt 69.7 kg (153 lb 11.2 oz)  SpO2 100%  BMI 31.03 kg/m2    General Appearance:    sedated, no distress   Head:    Normocephalic, without obvious abnormality, atraumatic   Eyes:       Throat:   Lips, tongue, gums normal   Neck:   Supple, symmetrical, trachea midline, no JVD   Lungs:     Clear to auscultation bilaterally, respirations unlabored   Chest Wall:    No tenderness or deformity    Heart:    irregular rate and rhythm, S1 and S2 normal, no murmur,no  Rub or gallop   Abdomen:     Soft, non-tender, bowel sounds " active, no masses, no organomegaly    Extremities:   Extremities normal, atraumatic, no cyanosis or edema, cellulitis in lower extremity improved   Pulses:      Skin:   Skin is warm and dry,  no rashes or palpable lesions   Neurologic:   sedated      [unfilled]  Data Review:    Results from last 7 days  Lab Units 01/26/18  0507 01/25/18  0551 01/24/18  0249   SODIUM mmol/L 144 145 143   POTASSIUM mmol/L 4.1 3.1* 3.2*   CHLORIDE mmol/L 111* 115* 113*   CO2 mmol/L 17.5* 19.6* 18.3*   BUN mg/dL 43* 30* 28*   CREATININE mg/dL 1.53* 1.09* 1.04*   GLUCOSE mg/dL 152* 103* 80   CALCIUM mg/dL 8.4* 7.3* 6.6*       Results from last 7 days  Lab Units 01/26/18  1119 01/26/18  0507 01/25/18  0551 01/24/18  0249   WBC 10*3/mm3  --  4.11* 3.31* 2.75*   HEMOGLOBIN g/dL  --  9.7* 9.1* 7.3*   HEMATOCRIT %  --  31.3* 29.3* 24.3*   PLATELETS 10*3/mm3 62* 66* 94* 69*       Results from last 7 days  Lab Units 01/24/18  0249   TSH mIU/mL 0.295       Results from last 7 days  Lab Units 01/24/18  0249   HEMOGLOBIN A1C % 5.60     No results found for: TROPONINT    Results from last 7 days  Lab Units 01/24/18  0249   CHOLESTEROL mg/dL 102   TRIGLYCERIDES mg/dL 347*   HDL CHOL mg/dL 9*       Results from last 7 days  Lab Units 01/25/18  0551 01/23/18  0542 01/22/18  0455   ALK PHOS U/L 277* 268* 346*   BILIRUBIN mg/dL 0.4 0.4 0.4   ALT (SGPT) U/L 14 14 19   AST (SGOT) U/L 55* 40* 56*       Results from last 7 days  Lab Units 01/24/18  0249   TSH mIU/mL 0.295       Results from last 7 days  Lab Units 01/24/18  0249   HEMOGLOBIN A1C % 5.60     Glucose   Date/Time Value Ref Range Status   01/26/2018 1154 185 (H) 70 - 130 mg/dL Final   01/26/2018 0728 168 (H) 70 - 130 mg/dL Final   01/26/2018 0353 163 (H) 70 - 130 mg/dL Final   01/25/2018 2014 106 70 - 130 mg/dL Final   01/25/2018 1549 111 70 - 130 mg/dL Final   01/25/2018 1356 92 70 - 130 mg/dL Final           Patient Active Problem List   Diagnosis Code   • FUO (fever of unknown origin) R50.9    • Hyperkalemia E87.5   • Benign essential HTN I10   • Cellulitis of right upper arm L03.113   • Hyponatremia E87.1   • JEFFERY (acute kidney injury) N17.9   • Anemia D64.9   • GI bleed K92.2   • Cellulitis L03.90   • Thrombocytopenia D69.6   • Paroxysmal atrial fibrillation I48.0   • PNA (pneumonia) J18.9   • 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   • Encephalopathy G93.40       Assessment:  Active Hospital Problems (** Indicates Principal Problem)    Diagnosis Date Noted   • **FUO (fever of unknown origin) [R50.9] 01/19/2018   • PNA (pneumonia) [J18.9] 01/26/2018   • ANCA-positive vasculitis [I77.6] 01/26/2018   • Encephalopathy [G93.40] 01/26/2018   • Paroxysmal atrial fibrillation [I48.0] 01/25/2018   • Thrombocytopenia [D69.6] 01/24/2018   • Cellulitis [L03.90] 01/22/2018   • GI bleed [K92.2] 01/21/2018   • Anemia [D64.9] 01/20/2018   • Hyperkalemia [E87.5] 01/19/2018   • Hyponatremia [E87.1] 01/19/2018   • JEFFERY (acute kidney injury) [N17.9] 01/19/2018      Resolved Hospital Problems    Diagnosis Date Noted Date Resolved   No resolved problems to display.       Plan:  ID consult noted.Neuro, pulmonary, nephrology, hematology and cardiology consults noted  Work up in progress  plans and antibiotics per  ID.    Protonix IV    . Converted to sinus. Continue high dose steroids. To transfer to [facility] via air ambulance for further evaluation. Discussed with family.   DC summary done.   See DC note  Elie Morales MD  1/26/2018  3:55 PM

## 2018-01-26 NOTE — SIGNIFICANT NOTE
01/26/18 1253   Rehab Treatment   Discipline occupational therapist   Rehab Evaluation   Evaluation Not Performed other (see comments)  (per Augustine Hedrick Pt. is not appropriate for OT today, OT will check on the pt. on Monday )   Recommendation   OT - Next Appointment 01/29/18

## 2018-01-26 NOTE — PROGRESS NOTES
LOS: 7 days   Patient Care Team:  Javier Soriano III, MD as PCP - General (Internal Medicine)    Chief Complaint: Follow-up sinus tach, PAF.    Interval History: Rapid atrial fibrillation yesterday.  Intubated and transferred to ICU.  Currently on Diltiazem gtt and sedated with rate in 70's (NSR).  Steroids started for possible vasculitis.    Vital Signs:  Temp:  [97.4 °F (36.3 °C)-97.9 °F (36.6 °C)] 97.4 °F (36.3 °C)  Heart Rate:  [] 81  Resp:  [16-40] 28  BP: ()/() 130/85  FiO2 (%):  [40 %-100 %] 40 %    Intake/Output Summary (Last 24 hours) at 01/26/18 1037  Last data filed at 01/26/18 0801   Gross per 24 hour   Intake          2794.94 ml   Output                0 ml   Net          2794.94 ml       Physical Exam:   General Appearance:    Intubated and sedated.   Lungs:     Clear to auscultation bilaterally     Heart:    Regular rhythm and normal rate.  No murmurs, gallops, or       rubs.   Abdomen:     Soft, non-distended.    Extremities:   Trace edema.     Results Review:      Results from last 7 days  Lab Units 01/26/18  0507   SODIUM mmol/L 144   POTASSIUM mmol/L 4.1   CHLORIDE mmol/L 111*   CO2 mmol/L 17.5*   BUN mg/dL 43*   CREATININE mg/dL 1.53*   GLUCOSE mg/dL 152*   CALCIUM mg/dL 8.4*       Results from last 7 days  Lab Units 01/25/18  0551   CK TOTAL U/L 152       Results from last 7 days  Lab Units 01/26/18  0507   WBC 10*3/mm3 4.11*   HEMOGLOBIN g/dL 9.7*   HEMATOCRIT % 31.3*   PLATELETS 10*3/mm3 66*           Results from last 7 days  Lab Units 01/24/18  0249   CHOLESTEROL mg/dL 102       Results from last 7 days  Lab Units 01/25/18  0551   MAGNESIUM mg/dL 1.9       Results from last 7 days  Lab Units 01/24/18  0249   CHOLESTEROL mg/dL 102   TRIGLYCERIDES mg/dL 347*   HDL CHOL mg/dL 9*       I reviewed the patient's new clinical results.        Assessment:  1. Fever of unknown origin  2. Possible lingular and ADDISON pneumonia  3. 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 Altered mental status  (TME)  5. Melena (GI bleed)  6. Acute kidney injury  7. Pancytopenia   8. Rheumatoid arthritis   9. Recent ischemic colitis   10. Sinus tachycardia   11. Paroxysmal atrial fibrillation    Plan:  -More stable since being intubated.  Currently sedated, and in NSR with rate in 70's.  -IV steroids started yesterday for possible CNS vasculitis.  -Will continue the Cardizem gtt for now.  -Planning on transferring to tertiary center soon - [facility] or [facility].    Thanks     Sarbjit Manzanares MD  01/26/18  10:37 AM

## 2018-01-26 NOTE — PLAN OF CARE
Problem: Patient Care Overview (Adult)  Goal: Plan of Care Review  Outcome: Ongoing (interventions implemented as appropriate)   01/26/18 5883   Outcome Evaluation   Outcome Summary/Follow up Plan arrangements made today for patient to be moved to [facility] via air transport. patient remains on ventilator and sedation. Cardizem held. Restraints continue       Problem: Infection, Risk/Actual (Adult)  Goal: Infection Prevention/Resolution  Outcome: Ongoing (interventions implemented as appropriate)      Problem: Fall Risk (Adult)  Goal: Absence of Falls  Outcome: Ongoing (interventions implemented as appropriate)      Problem: Pain, Acute (Adult)  Goal: Acceptable Pain Control/Comfort Level  Outcome: Ongoing (interventions implemented as appropriate)      Problem: Pressure Ulcer Risk (Checo Scale) (Adult,Obstetrics,Pediatric)  Goal: Skin Integrity  Outcome: Ongoing (interventions implemented as appropriate)      Problem: Skin Integrity Impairment, Risk/Actual (Adult)  Goal: Skin Integrity/Wound Healing  Outcome: Ongoing (interventions implemented as appropriate)    Goal: Skin Integrity/Wound Healing  Outcome: Ongoing (interventions implemented as appropriate)      Problem: Confusion, Acute (Adult)  Goal: Cognitive/Functional Impairments Minimized  Outcome: Ongoing (interventions implemented as appropriate)      Problem: SAFETY - NON-VIOLENT RESTRAINT  Goal: Remains free of injury from restraints (Non-Violent Restraint)  Outcome: Ongoing (interventions implemented as appropriate)    Goal: Free from restraint(s) (Non-Violent Restraint)  Outcome: Ongoing (interventions implemented as appropriate)      Problem: Respiratory Insufficiency (Adult)  Goal: Acid/Base Balance  Outcome: Ongoing (interventions implemented as appropriate)

## 2018-01-27 VITALS
WEIGHT: 153.7 LBS | TEMPERATURE: 97.5 F | HEIGHT: 59 IN | OXYGEN SATURATION: 100 % | BODY MASS INDEX: 30.99 KG/M2 | DIASTOLIC BLOOD PRESSURE: 84 MMHG | RESPIRATION RATE: 18 BRPM | SYSTOLIC BLOOD PRESSURE: 141 MMHG | HEART RATE: 91 BPM

## 2018-01-27 LAB
ANION GAP SERPL CALCULATED.3IONS-SCNC: 17.5 MMOL/L
ANISOCYTOSIS BLD QL: NORMAL
BASOPHILS # BLD AUTO: 0.01 10*3/MM3 (ref 0–0.2)
BASOPHILS NFR BLD AUTO: 0.2 % (ref 0–1.5)
BUN BLD-MCNC: 58 MG/DL (ref 8–23)
BUN/CREAT SERPL: 22 (ref 7–25)
CALCIUM SPEC-SCNC: 7.8 MG/DL (ref 8.6–10.5)
CHLORIDE SERPL-SCNC: 113 MMOL/L (ref 98–107)
CO2 SERPL-SCNC: 15.5 MMOL/L (ref 22–29)
CREAT BLD-MCNC: 2.64 MG/DL (ref 0.57–1)
DACRYOCYTES BLD QL SMEAR: NORMAL
DEPRECATED RDW RBC AUTO: 66.7 FL (ref 37–54)
EOSINOPHIL # BLD AUTO: 0 10*3/MM3 (ref 0–0.7)
EOSINOPHIL NFR BLD AUTO: 0 % (ref 0.3–6.2)
ERYTHROCYTE [DISTWIDTH] IN BLOOD BY AUTOMATED COUNT: 24.2 % (ref 11.7–13)
GFR SERPL CREATININE-BSD FRML MDRD: 22 ML/MIN/1.73
GLUCOSE BLD-MCNC: 226 MG/DL (ref 65–99)
GLUCOSE BLDC GLUCOMTR-MCNC: 201 MG/DL (ref 70–130)
GLUCOSE BLDC GLUCOMTR-MCNC: 247 MG/DL (ref 70–130)
GLUCOSE BLDC GLUCOMTR-MCNC: 274 MG/DL (ref 70–130)
HCT VFR BLD AUTO: 28.5 % (ref 35.6–45.5)
HGB BLD-MCNC: 8.7 G/DL (ref 11.9–15.5)
HYPOCHROMIA BLD QL: NORMAL
IMM GRANULOCYTES # BLD: 0.03 10*3/MM3 (ref 0–0.03)
IMM GRANULOCYTES NFR BLD: 0.6 % (ref 0–0.5)
LYMPHOCYTES # BLD AUTO: 0.62 10*3/MM3 (ref 0.9–4.8)
LYMPHOCYTES NFR BLD AUTO: 13.3 % (ref 19.6–45.3)
MCH RBC QN AUTO: 23.6 PG (ref 26.9–32)
MCHC RBC AUTO-ENTMCNC: 30.5 G/DL (ref 32.4–36.3)
MCV RBC AUTO: 77.2 FL (ref 80.5–98.2)
MONOCYTES # BLD AUTO: 0.26 10*3/MM3 (ref 0.2–1.2)
MONOCYTES NFR BLD AUTO: 5.6 % (ref 5–12)
NEUTROPHILS # BLD AUTO: 3.73 10*3/MM3 (ref 1.9–8.1)
NEUTROPHILS NFR BLD AUTO: 80.3 % (ref 42.7–76)
NRBC BLD MANUAL-RTO: 4.4 /100 WBC (ref 0–0)
PLAT MORPH BLD: NORMAL
PLATELET # BLD AUTO: 83 10*3/MM3 (ref 140–500)
PMV BLD AUTO: ABNORMAL FL (ref 6–12)
POTASSIUM BLD-SCNC: 3.7 MMOL/L (ref 3.5–5.2)
RBC # BLD AUTO: 3.69 10*6/MM3 (ref 3.9–5.2)
SODIUM BLD-SCNC: 146 MMOL/L (ref 136–145)
TARGETS BLD QL SMEAR: NORMAL
WBC MORPH BLD: NORMAL
WBC NRBC COR # BLD: 4.65 10*3/MM3 (ref 4.5–10.7)

## 2018-01-27 PROCEDURE — 99231 SBSQ HOSP IP/OBS SF/LOW 25: CPT | Performed by: INTERNAL MEDICINE

## 2018-01-27 PROCEDURE — 25010000002 PROPOFOL 1000 MG/ML EMULSION: Performed by: INTERNAL MEDICINE

## 2018-01-27 PROCEDURE — 94799 UNLISTED PULMONARY SVC/PX: CPT

## 2018-01-27 PROCEDURE — 82962 GLUCOSE BLOOD TEST: CPT

## 2018-01-27 PROCEDURE — 80048 BASIC METABOLIC PNL TOTAL CA: CPT | Performed by: HOSPITALIST

## 2018-01-27 PROCEDURE — 87070 CULTURE OTHR SPECIMN AEROBIC: CPT | Performed by: INTERNAL MEDICINE

## 2018-01-27 PROCEDURE — 25010000002 PIPERACILLIN SOD-TAZOBACTAM PER 1 G: Performed by: INTERNAL MEDICINE

## 2018-01-27 PROCEDURE — 85025 COMPLETE CBC W/AUTO DIFF WBC: CPT | Performed by: INTERNAL MEDICINE

## 2018-01-27 PROCEDURE — 87186 SC STD MICRODIL/AGAR DIL: CPT | Performed by: INTERNAL MEDICINE

## 2018-01-27 PROCEDURE — 87205 SMEAR GRAM STAIN: CPT | Performed by: INTERNAL MEDICINE

## 2018-01-27 PROCEDURE — 63710000001 INSULIN ASPART PER 5 UNITS: Performed by: INTERNAL MEDICINE

## 2018-01-27 PROCEDURE — 25010000002 METHYLPREDNISOLONE

## 2018-01-27 PROCEDURE — 94003 VENT MGMT INPAT SUBQ DAY: CPT

## 2018-01-27 PROCEDURE — 85007 BL SMEAR W/DIFF WBC COUNT: CPT | Performed by: INTERNAL MEDICINE

## 2018-01-27 RX ORDER — DEXTROSE MONOHYDRATE 25 G/50ML
25 INJECTION, SOLUTION INTRAVENOUS
Status: DISCONTINUED | OUTPATIENT
Start: 2018-01-27 | End: 2018-01-27 | Stop reason: HOSPADM

## 2018-01-27 RX ORDER — NICOTINE POLACRILEX 4 MG
15 LOZENGE BUCCAL
Status: DISCONTINUED | OUTPATIENT
Start: 2018-01-27 | End: 2018-01-27 | Stop reason: HOSPADM

## 2018-01-27 RX ORDER — AMLODIPINE BESYLATE 5 MG/1
5 TABLET ORAL
Start: 2018-01-28

## 2018-01-27 RX ORDER — HYDRALAZINE HYDROCHLORIDE 20 MG/ML
10 INJECTION INTRAMUSCULAR; INTRAVENOUS EVERY 6 HOURS PRN
Start: 2018-01-27

## 2018-01-27 RX ORDER — SODIUM CHLORIDE 9 MG/ML
100 INJECTION, SOLUTION INTRAVENOUS CONTINUOUS
Start: 2018-01-27

## 2018-01-27 RX ORDER — LANSOPRAZOLE
30 KIT EVERY 12 HOURS SCHEDULED
Start: 2018-01-27

## 2018-01-27 RX ORDER — GABAPENTIN 250 MG/5ML
200 SOLUTION ORAL EVERY 12 HOURS SCHEDULED
Start: 2018-01-27

## 2018-01-27 RX ADMIN — PROPOFOL 45 MCG/KG/MIN: 10 INJECTION, EMULSION INTRAVENOUS at 00:51

## 2018-01-27 RX ADMIN — GABAPENTIN 200 MG: 250 SOLUTION ORAL at 10:37

## 2018-01-27 RX ADMIN — METHYLPREDNISOLONE SODIUM SUCCINATE 1 G: 1 INJECTION, POWDER, FOR SOLUTION INTRAMUSCULAR; INTRAVENOUS at 16:31

## 2018-01-27 RX ADMIN — INSULIN ASPART 10 UNITS: 100 INJECTION, SOLUTION INTRAVENOUS; SUBCUTANEOUS at 13:23

## 2018-01-27 RX ADMIN — PROPOFOL 45 MCG/KG/MIN: 10 INJECTION, EMULSION INTRAVENOUS at 10:36

## 2018-01-27 RX ADMIN — INSULIN ASPART 4 UNITS: 100 INJECTION, SOLUTION INTRAVENOUS; SUBCUTANEOUS at 17:46

## 2018-01-27 RX ADMIN — LANSOPRAZOLE 30 MG: KIT at 11:32

## 2018-01-27 RX ADMIN — TAZOBACTAM SODIUM AND PIPERACILLIN SODIUM 3.38 G: 375; 3 INJECTION, SOLUTION INTRAVENOUS at 05:11

## 2018-01-27 RX ADMIN — AMLODIPINE BESYLATE 5 MG: 5 TABLET ORAL at 10:36

## 2018-01-27 RX ADMIN — CYCLOSPORINE 1 DROP: 0.5 EMULSION OPHTHALMIC at 10:11

## 2018-01-27 RX ADMIN — TAZOBACTAM SODIUM AND PIPERACILLIN SODIUM 3.38 G: 375; 3 INJECTION, SOLUTION INTRAVENOUS at 13:22

## 2018-01-27 RX ADMIN — PROPOFOL 45 MCG/KG/MIN: 10 INJECTION, EMULSION INTRAVENOUS at 06:38

## 2018-01-27 RX ADMIN — CASTOR OIL AND BALSAM, PERU: 788; 87 OINTMENT TOPICAL at 10:11

## 2018-01-27 RX ADMIN — PROPOFOL 20 MCG/KG/MIN: 10 INJECTION, EMULSION INTRAVENOUS at 18:25

## 2018-01-27 NOTE — NURSING NOTE
Talked with Concha at transfer center and Dr. Welch.  To talk to Neurologist at [facility] to give Dr Welch's number.  Transfer center stated they will call back and may be able to get patient in to a neuro ICU bed.

## 2018-01-27 NOTE — PROGRESS NOTES
[facility]  Phone: [phone]  Rusty Toussaint MD    Subjective:  LOS: 8    Family has multiple questions about the transfer process.  Patient is intubated and sedated.    Objective   Vital Signs past 24hrs  BP range: BP: (101-148)/(70-93) 139/83  Pulse range: Heart Rate:  [67-84] 84  Resp rate range: Resp:  [16-19] 17  Temp range: Temp (24hrs), Av °F (36.7 °C), Min:97.5 °F (36.4 °C), Max:98.8 °F (37.1 °C)    O2 Device: mechanical ventilator   Oxygen range:SpO2:  [99 %-100 %] 100 %   69.7 kg (153 lb 11.2 oz); Body mass index is 31.03 kg/(m^2).    Intake/Output Summary (Last 24 hours) at 18 1015  Last data filed at 18 0511   Gross per 24 hour   Intake           4081.1 ml   Output                0 ml   Net           4081.1 ml       Physical Exam   Constitutional: She appears well-developed.   HENT:   Head: Normocephalic.   Eyes: Pupils are equal, round, and reactive to light.   Cardiovascular: Normal rate and regular rhythm.    No murmur heard.  Pulmonary/Chest: Effort normal. She has no rales.   Abdominal: Soft. Bowel sounds are normal. She exhibits no distension. There is no tenderness.   Musculoskeletal: She exhibits edema (mild).   Neurological:   Withdraws appropriately     Results Review:    I have reviewed the laboratory and imaging data since the last note by [facility] physician.  My annotations are noted in assessment and plan.    Medication Review:  I have reviewed the current MAR.  My annotations are noted in assessment and plan.      diltiaZEM 5-15 mg/hr Last Rate: Stopped (18 1100)   hold 1 each    propofol 5-50 mcg/kg/min Last Rate: 45 mcg/kg/min (18 0638)   sodium chloride 100 mL/hr Last Rate: 100 mL/hr (186)     Plan   PCCM Problems  Acute respiratory failure, vent  Encephalopathy, ? Encephalitis, on steroids  FUO, improved  JEFFERY, worsening  LE cellulitis, improved  Abnormal vasculitis markers, ? Lupus  Lymphadenopathy,  unexplained  Anemia  Thrombocytopenia  Hyperglycemia due to steroids  Paroxysmal Afib    Plan of Treatment  Remains on volume control ventilation.  Continue same.    Encephalitis as cause of encephalopathy suspected.  Possible CNS vasculitis.  On high-dose steroids per neurology.  Infectious process has been discounted.    Acute kidney injury is worsening.  Currently on IV fluids.  Renal is following.  She does already have some edema.  Monitor urine output.  Pineda catheter requested.    Unexplained lymphadenopathy in the axilla retroperitoneal and subscapular areas.  Also anemia and thrombocytopenia.  Possibly due to autoimmune phenomenon.    Hyperglycemia due to steroids.  Will place on ICU protocol for sugars.    Paroxysmal atrial fibrillation but now sinus.  Currently off diltiazem drip.    Spoke with son at length at bedside.    Spoke with transfer center at [facility] regarding transfer.  We will need to call every few hours to check regarding bed.    I spent +45 mins critical care time in care of this patient outside of any procedures.     Rusty Toussaint MD  01/27/18  10:15 AM    Part of this note may be an electronic transcription/translation of spoken language to printed text using the Dragon Dictation System.

## 2018-01-27 NOTE — PROGRESS NOTES
LOS: 8 days   Patient Care Team:  Javier Soriano III, MD as PCP - General (Internal Medicine)    Chief Complaint: Follow-up sinus tach, PAF.    Interval History: Remains in NSR.  Intubated.  Renal function worsening. Hopefully to [facility] today.    Vital Signs:  Temp:  [97.5 °F (36.4 °C)-98.8 °F (37.1 °C)] 98.2 °F (36.8 °C)  Heart Rate:  [67-84] 84  Resp:  [16-19] 17  BP: (101-148)/(70-93) 139/83  FiO2 (%):  [40 %] 40 %    Intake/Output Summary (Last 24 hours) at 01/27/18 1016  Last data filed at 01/27/18 0511   Gross per 24 hour   Intake           4081.1 ml   Output                0 ml   Net           4081.1 ml       Physical Exam:   General Appearance:    Intubated and sedated.   Lungs:     Clear to auscultation bilaterally     Heart:    Regular rhythm and normal rate.  No murmurs, gallops, or       rubs.   Abdomen:     Soft, non-distended.    Extremities:   Trace edema lower extremities      Results Review:      Results from last 7 days  Lab Units 01/27/18  0511   SODIUM mmol/L 146*   POTASSIUM mmol/L 3.7   CHLORIDE mmol/L 113*   CO2 mmol/L 15.5*   BUN mg/dL 58*   CREATININE mg/dL 2.64*   GLUCOSE mg/dL 226*   CALCIUM mg/dL 7.8*       Results from last 7 days  Lab Units 01/25/18  0551   CK TOTAL U/L 152       Results from last 7 days  Lab Units 01/27/18  0059   WBC 10*3/mm3 4.65   HEMOGLOBIN g/dL 8.7*   HEMATOCRIT % 28.5*   PLATELETS 10*3/mm3 83*           Results from last 7 days  Lab Units 01/24/18  0249   CHOLESTEROL mg/dL 102       Results from last 7 days  Lab Units 01/25/18  0551   MAGNESIUM mg/dL 1.9       Results from last 7 days  Lab Units 01/24/18  0249   CHOLESTEROL mg/dL 102   TRIGLYCERIDES mg/dL 347*   HDL CHOL mg/dL 9*       I reviewed the patient's new clinical results.        Assessment:  1. Fever of unknown origin  2. Possible lingular and ADDISON pneumonia  3. 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 Altered mental status (TME)  5. Melena (GI bleed)  6. Acute kidney injury  7. Pancytopenia   8.  Rheumatoid arthritis   9. Recent ischemic colitis   10. Sinus tachycardia   11. Paroxysmal atrial fibrillation    Plan:  -Worsening renal function noted.  -IV steroids for possible vasculitis.  -Off Cardizem gtt.  Has been in sinus rhythm with stable rate for over 24 hours.    -Planning to transfer to [facility] hopefully today.    Sarbjit Manzanares MD  01/27/18  10:16 AM

## 2018-01-27 NOTE — PROGRESS NOTES
Continued Stay Note  [facility]     Patient Name: Domitila Valera  MRN: [id number]  Today's Date: 1/27/2018    Admit Date: 1/19/2018          Discharge Plan       01/27/18 1024    Case Management/Social Work Plan    Plan accepted at [facility]--bed remains pending at this time    Patient/Family In Agreement With Plan yes    Additional Comments Received call from staff RN Lenora who states MD wants an update on pt's transfer to [facility]. Called [facility] admissions [phone] and spoke with Johny. Per Johny--there are no beds available at this time. States the process when a bed is available will be: [facility] will call [facility] CCU when bed is available. [facility] RNs are encouraged to call for any changes in pt's status b/c bed assignments are based on pt acuity. The [facility] transport team will make the arrangements for ground transport vs air transport when bed is available and give [facility] staff ETA for transport. CCP to follow..........JW              Discharge Codes     None            Claudia Hollis, RN

## 2018-01-27 NOTE — DISCHARGE SUMMARY
PHYSICIAN DISCHARGE SUMMARY                                                                        [facility]    Patient Identification:  Name: Domitila Valera  Age: 62 y.o.  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      Patient Active Problem List   Diagnosis Code   • FUO (fever of unknown origin) R50.9   • Hyperkalemia E87.5   • Benign essential HTN I10   • Cellulitis of right upper arm L03.113   • Hyponatremia E87.1   • JEFFERY (acute kidney injury) N17.9   • Anemia D64.9   • GI bleed K92.2   • Cellulitis L03.90   • Thrombocytopenia D69.6   • Paroxysmal atrial fibrillation I48.0   • PNA (pneumonia) J18.9   • 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   • Encephalopathy G93.40   • Positive ABI (antinuclear antibody) R76.8   • Rheumatoid arthritis M06.9       PMHX:   Past Medical History:   Diagnosis Date   • Hypertension    • Ischemic colitis    • Rheumatoid arthritis    • Thyroid nodule      PSHX:   Past  Surgical History:   Procedure Laterality Date   • ANKLE SURGERY Right    •  SECTION     • HYSTERECTOMY     • JOINT REPLACEMENT Bilateral     knee       Hospital Course: Domitila Valera  is a 62 y.o. female has a history of Rheumatoid arthritis will normally sees Dr. Bro Lewis  was on immunosuppressive therapy with Xeljanz, recently stopped after infection.  She has been to [facility] multiple times and around [place] she tells me that she was diagnosed with ischemic colitis and some liver nodules.  The MRI showed these nodules could be hemangiomas.  She did had bloody stools had developed anemia.  Now those symptoms have resolved.  The patient had been discharged from [facility] on  and plan was to treat with Diflucan for 30 days for possible candidal infection but reviewing cultures there only Candida grew in urine only but not blood.   Today she presented with a history of having fever fatigue and pain in the right ankle.  The patient had been having pain in both ankles and there was some redness in both lower extremities with initial concern for some cellulitis.   She denies any nausea vomiting cough sputum production.  She did had fever up to 101.2 this morning.  She was not happy at [facility] and since she presented to [facility] ER.        Patient was admitted hospital and seen by infectious disease and treated with broad-spectrum antibiotics and with her encephalopathy also initially acyclovir.  Her mental status worsened and neurology saw the patient and evaluation with LP which was negative for meningitis.  There was concern the patient may have a vasculitis aggravating her encephalopathy.  She did have positive c-ANCA and was started on high-dose steroids.  The patient had deterioration with episode of rapid A. fib and hypoxia requiring intubation and transfer to ICU.  The patient did convert back to sinus rhythm with Cardizem drip and was stabilized in the ICU on ventilator with  40% O2 5 of PEEP.  Family was requesting transfer to a Medical Center with higher level of care.  Arrangements are underway for patient to be transferred to [facility] via air ambulance for further evaluation treatment of her condition.  The concern is she may have vasculitis and may require cerebral angiogram and/or possibly brain biopsy to evaluate further with her deteriorating mental status.  The patient also had some degree of aspiration pneumonia and after herpes PCR was negative and no other cultures growing anything in meningitis PCR being negative antibiotics were tapered back to Zosyn and acyclovir was discontinued.  Hopefully bed will become available clinic [facility] later today and patient will transfer by air ambulance to ICU for continuing care and further evaluation.      Consults:     Consults     Date and Time Order Name Status Description    1/26/2018 0819 Inpatient Consult to Hematology & Oncology Completed     1/24/2018 0026 Inpatient Consult to Neurosurgery      1/23/2018 1113 Inpatient Consult to Rheumatology      1/23/2018 1041 Inpatient Consult to Neurology Completed     1/22/2018 2251 Inpatient Consult to Cardiology Completed     1/21/2018 1316 Inpatient Consult to Gastroenterology Completed     1/19/2018 2212 Inpatient Consult to Infectious Diseases Completed     1/19/2018 2212 Inpatient Consult to Nephrology      1/19/2018 1947 [facility] (on-call MD unless specified) Completed           Results from last 7 days  Lab Units 01/27/18  0059   WBC 10*3/mm3 4.65   HEMOGLOBIN g/dL 8.7*   HEMATOCRIT % 28.5*   PLATELETS 10*3/mm3 83*       Results from last 7 days  Lab Units 01/27/18  0511   SODIUM mmol/L 146*   POTASSIUM mmol/L 3.7   CHLORIDE mmol/L 113*   CO2 mmol/L 15.5*   BUN mg/dL 58*   CREATININE mg/dL 2.64*   GLUCOSE mg/dL 226*   CALCIUM mg/dL 7.8*     Significant Diagnostic Studies:   Lab Results   Component Value Date    WBC 4.65 01/27/2018    HGB 8.7 (L) 01/27/2018    HCT  28.5 (L) 01/27/2018    PLT 83 (L) 01/27/2018     Lab Results   Component Value Date     (H) 01/27/2018    K 3.7 01/27/2018     (H) 01/27/2018    CO2 15.5 (L) 01/27/2018    BUN 58 (H) 01/27/2018    CREATININE 2.64 (H) 01/27/2018    GLUCOSE 226 (H) 01/27/2018     Lab Results   Component Value Date    CALCIUM 7.8 (L) 01/27/2018    MG 1.9 01/25/2018     Lab Results   Component Value Date    AST 55 (H) 01/25/2018    ALT 14 01/25/2018    ALKPHOS 277 (H) 01/25/2018     No results found for: APTT, INR  No results found for: COLORU, CLARITYU, SPECGRAV, PHUR, PROTEINUR, GLUCOSEU, KETONESU, BLOODU, NITRITE, LEUKOCYTESUR, BILIRUBINUR, UROBILINOGEN, RBCUA, WBCUA, BACTERIA  No results found for: TROPONINT, TROPONINI, BNP  No components found for: HGBA1C;2  No components found for: TSH;2  Imaging Results (all)     Procedure Component Value Units Date/Time    US Renal Bilateral [[id number]] Collected:  01/20/18 0823     Updated:  01/20/18 0827    Narrative:       US RENAL BILATERAL-     INDICATIONS: Acute kidney injury     TECHNIQUE: ULTRASOUND OF THE KIDNEYS AND URINARY BLADDER.     COMPARISON: None available     FINDINGS:     The right kidney measures 10.0 centimeters, the left kidney measures 9.6  centimeters.     No renal lesion is identified. A few echogenic foci are apparent in the  right kidney, as large as 3 mm, suggesting small nonobstructive stones.  No hydronephrosis or echogenic left nephrolithiasis.     No ureteral jets were observed during the exam. The urinary bladder  otherwise appears unremarkable.       Impression:       No hydronephrosis.     This report was finalized on 1/20/2018 8:24 AM by Dr. Mt Torres MD.       CT Abdomen Pelvis With Contrast [[id number]] Collected:  01/20/18 1649     Updated:  01/20/18 1837    Narrative:       CT CHEST ABDOMEN AND PELVIS WITH CONTRAST     HISTORY: Acute renal failure, hyponatremia, malaise, weakness.     TECHNIQUE: CT chest, abdomen and pelvis with IV and  oral contrast.     COMPARISON: There are no previous CTs for comparison.     FINDINGS  CHEST: There are small mediastinal nodes without evidence for  mediastinal or hilar ramya enlargement. Shotty bilateral axillary nodes  are present. There is a 1.1 cm right axillary node. There is also a  mildly enlarged left subpectoral node measuring 1.5 cm.     Small pleural effusions layer dependently and there is left and right  lower lobe atelectasis. Within the lingula and anterior/inferior lateral  left upper lobe there is peripheral airspace disease and ground glass  opacity that is consistent with infiltrate in the proper clinical  setting. Right lung appears clear with the exception of right basilar  atelectasis. Heart size is enlarged. There is advanced bilateral  shoulder osteoarthritis. Mild scoliotic curvature is present of the  thoracic spine.     ABDOMEN/PELVIS:  There is diffuse fat infiltration of the liver. Dense  material layers dependently within the gallbladder fundus due to sludge  or small stones or vicarious excretion of contrast. Splenic size is  within normal limits. Adrenal glands, pancreas, and right kidney appear  within normal limits. There is a 9 mm left mid to lower pole low-density  renal lesion that is difficult to characterize due to its small size,  though it is most likely a cyst. There are multiple retroperitoneal  lymph nodes with borderline enlargement. A left periaortic node measures  1.2 cm. There is no bowel dilatation or evidence for bowel obstruction.  There is no evidence for appendicitis. Oral contrast extends to the  rectum.  There is no bowel dilatation or evidence for obstruction. There  are bilateral inguinal nodes which are borderline enlarged. A right  inguinal node measures 1.6 x 0.9 cm.       Impression:       1. Small bilateral pleural effusions with lower lobe atelectasis. Within  the lingula and anterior inferior lateral left upper lobe there is  peripheral area of  consolidation and ground glass opacity consistent  with infiltrate in the proper clinical setting.  2. Cardiomegaly.  3. Borderline enlarged nodes within the right axilla, left subpectoral  region, retroperitoneum, right inguinal region. These may represent  reactive nodes though are indeterminate with this exam. There are no  previous studies for comparison.  4. Dense material within the gallbladder fundus due to sludge, vicarious  excretion of contrast, or small stones.  5. Sigmoid diverticulosis without evidence for diverticulitis.  6. Advanced bilateral shoulder osteoarthritis.      Radiation dose reduction techniques were utilized, including automated  exposure control and exposure modulation based on body size.     This report was finalized on 1/20/2018 6:34 PM by Dr. Blake Graham MD.       CT Chest With Contrast [[id number]] Collected:  01/20/18 1649     Updated:  01/20/18 1837    Narrative:       CT CHEST ABDOMEN AND PELVIS WITH CONTRAST     HISTORY: Acute renal failure, hyponatremia, malaise, weakness.     TECHNIQUE: CT chest, abdomen and pelvis with IV and oral contrast.     COMPARISON: There are no previous CTs for comparison.     FINDINGS  CHEST: There are small mediastinal nodes without evidence for  mediastinal or hilar ramya enlargement. Shotty bilateral axillary nodes  are present. There is a 1.1 cm right axillary node. There is also a  mildly enlarged left subpectoral node measuring 1.5 cm.     Small pleural effusions layer dependently and there is left and right  lower lobe atelectasis. Within the lingula and anterior/inferior lateral  left upper lobe there is peripheral airspace disease and ground glass  opacity that is consistent with infiltrate in the proper clinical  setting. Right lung appears clear with the exception of right basilar  atelectasis. Heart size is enlarged. There is advanced bilateral  shoulder osteoarthritis. Mild scoliotic curvature is present of the  thoracic spine.      ABDOMEN/PELVIS:  There is diffuse fat infiltration of the liver. Dense  material layers dependently within the gallbladder fundus due to sludge  or small stones or vicarious excretion of contrast. Splenic size is  within normal limits. Adrenal glands, pancreas, and right kidney appear  within normal limits. There is a 9 mm left mid to lower pole low-density  renal lesion that is difficult to characterize due to its small size,  though it is most likely a cyst. There are multiple retroperitoneal  lymph nodes with borderline enlargement. A left periaortic node measures  1.2 cm. There is no bowel dilatation or evidence for bowel obstruction.  There is no evidence for appendicitis. Oral contrast extends to the  rectum.  There is no bowel dilatation or evidence for obstruction. There  are bilateral inguinal nodes which are borderline enlarged. A right  inguinal node measures 1.6 x 0.9 cm.       Impression:       1. Small bilateral pleural effusions with lower lobe atelectasis. Within  the lingula and anterior inferior lateral left upper lobe there is  peripheral area of consolidation and ground glass opacity consistent  with infiltrate in the proper clinical setting.  2. Cardiomegaly.  3. Borderline enlarged nodes within the right axilla, left subpectoral  region, retroperitoneum, right inguinal region. These may represent  reactive nodes though are indeterminate with this exam. There are no  previous studies for comparison.  4. Dense material within the gallbladder fundus due to sludge, vicarious  excretion of contrast, or small stones.  5. Sigmoid diverticulosis without evidence for diverticulitis.  6. Advanced bilateral shoulder osteoarthritis.      Radiation dose reduction techniques were utilized, including automated  exposure control and exposure modulation based on body size.     This report was finalized on 1/20/2018 6:34 PM by Dr. Blake Graham MD.       XR Ankle 2 View Bilateral [[id number]] Collected:   01/20/18 1514     Updated:  01/20/18 1837    Narrative:       BOTH ANKLES: AP AND LATERAL VIEWS OF EACH     HISTORY: Pain and swelling for 2 weeks in both ankles.     COMPARISON: None.     RIGHT ANKLE: There is advanced tibiotalar and subtalar arthritis with  joint space loss and exuberant marginal osteophyte formation. Chronic  osteochondral bodies project anterior to the tibiotalar joint and extend  above the talar head where there is spur formation. Generalized soft  tissue swelling is present about the ankle and there are vascular  calcifications. Moderate-to-severe osteoarthritis is present of the  talonavicular joint. There is no evidence for fracture.     LEFT ANKLE: Tibiotalar joint space appears within normal limits. There  are arthritic changes of the subtalar joints and at the talonavicular  navicular cuneiform joints, where there is joint space narrowing and  spur formation. Generalized soft tissue swelling is present about the  ankle. There is no evidence for fracture or acute osseous abnormality.  Atherosclerotic calcifications are evident.     This report was finalized on 1/20/2018 6:34 PM by Dr. Blake Graham MD.       IR Lumbar Puncture Diagnosis [[id number]] Collected:  01/21/18 1625     Updated:  01/21/18 1631    Narrative:       IR LUMBAR PUNCTURE DIAGNOSIS-     INDICATIONS: Fever     FINDINGS:     Following detailed discussion with the patient of the risks, benefits,  alternatives of the procedure, verbal and written informed consent was  obtained from the patient's son, this patient was unable to provide  consent.     Patient was placed in a prone position on the fluoroscopy table. A final  timeout was performed verifying patient identity and procedure.     A location was chosen at the L3/4 level, the overlying skin was cleaned  and anesthetized, and a 20-gauge needle was advanced into the thecal  sac, revealing clear CSF. The fluid was extremely slow-flowing despite  repositioning the  needle and the patient, and only approximately 2 cc  could be obtained. Opening pressure could not be assessed. The fluid was  sent to the laboratory for further evaluation. Needle was removed  intact.     Fluoroscopy time: 20 seconds, 4 images       Impression:          Lumbar puncture, as described. Collected fluid was sent to the  laboratory for further evaluation.      This report was finalized on 1/21/2018 4:28 PM by Dr. Mt Torres MD.       MRI Brain With & Without Contrast [[id number]] Collected:  01/22/18 1942     Updated:  01/22/18 2055    Narrative:       MR SCAN OF THE BRAIN WITHOUT AND WITH INTRAVENOUS CONTRAST     HISTORY: Confusion. Generalized weakness.     TECHNIQUE: The MR scan was performed with sagittal, axial, and coronal  images and includes T1 images without and with intravenous contrast.      FINDINGS: There is mild diffuse atrophy and some minimal chronic small  vessel ischemic change. The diffusion sequence demonstrates a tiny focus  of increased signal in the right vertex in the posterior right frontal  region measuring 2 or 3 mm. This is suspicious for a tiny acute infarct.  There is no associated hemorrhage or abnormal enhancement or mass  effect. The remainder of the MR scan is unremarkable except for some  mild mucosal thickening scattered in the ethmoid air cells.     CONCLUSION: Mild diffuse atrophy and chronic small vessel ischemic  change. Probable tiny acute infarct in the right vertex in the posterior  right frontal region measuring 2 or 3 mm as seen on the diffusion  sequence.     This report was finalized on 1/22/2018 8:52 PM by Dr. Willie Newton MD.       CT Angiogram Head With & Without Contrast [[id number]] Collected:  01/24/18 0818     Updated:  01/24/18 1108    Narrative:       CONTRAST-ENHANCED CT ANGIOGRAM OF THE HEAD AND NECK -  01/24/2018     HISTORY: Subarachnoid hemorrhage, evaluate for cerebral aneurysm and  vasospasm.     TECHNIQUE: Spiral CT images were  obtained from the base of the skull to  the vertex both pre and post intravenous contrast, and images were  reformatted and submitted in 3 mm thick axial CT section with brain  algorithm, and additional spiral CT images were obtained from the top of  the aortic arch up through the great vessels in the head and neck during  the arterial phase of contrast, and images were reformatted and  submitted in 1 mm thick axial CT section, 1 mm thick sagittal and  coronal reconstructions and 3-D reconstructions were performed to  complete the CT angiogram of the head and neck.     There are no prior CTs of the head for comparison. This is correlated to  prior MRI of the brain on 01/22/2018 as well as MRA of the head on last  evening 01/23/2018 at 9:50 PM.     FINDINGS: There is a questionable very subtle 2 cm area of low-density  in the inferior lateral right frontal cortex and juxtacortical white  matter.  This is an equivocal finding seen on precontrast axial CT  images 30 through 33.  The remainder of the brain parenchyma is normal  in attenuation. The ventricles are normal in size. I see no mass effect  and no midline shift and no extra-axial fluid collections are  identified. There is no evidence of acute intracranial hemorrhage. No  abnormal areas of enhancement are seen in the head.  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 The oral  pharynx, hypopharynx, true cords and subglottic airway are normal in  appearance; there is heterogeneous enhancement in the thyroid gland. The  lung apices are clear, there is small bilateral posterior layering  effusions and there is discoid area of low density projecting over the  superior aspect of the left major fissure that could be loculated fluid  in left major fissure. The parotid, , parapharyngeal,  submandibular spaces are symmetric and are normal in appearance.  There  is an abnormal appearance to the C1-2 level with marked widening of the  atlantodental interval measuring 7 mm, and there appears to be bony  bridging from the posterior aspect of the anterior ring of C1 and the  anterior cortex of the odontoid and also some bony fusion across the  lateral masses of C1 and C2 bilaterally, and I suspect that this is  related to old trauma and as a result there is moderate narrowing of the  anterior posterior diameter of the canal at the C1 ring level. There is  disc space narrowing, degenerative endplate changes at C5-6, posterior  endplate spurring, mild canal narrowing and left uncovertebral joint  hypertrophy, moderate left bony foraminal narrowing at C5-6. There are  prominent osteoarthritic changes in the left glenohumeral joint with  joint space narrowing, moderate marginal osteophytic spurring and there  is extensive fatty marrow replacement in the visualized proximal left  humerus and a coarse area of calcification in the proximal left humeral  metaphysis and humeral neck in the medullary cavity that measures 17 mm  in size, could be calcification, old bone infarct or an enchondroma.     CT angiogram images demonstrate common origin of left common carotid  artery and brachiocephalic artery off the aortic arch constituting a  bovine configuration to the aortic arch which is a normal anatomic  variation. The left subclavian artery origin is normal in appearance, no  stenosis is seen in left subclavian artery. The left vertebral artery  origin is normal in appearance, no stenosis is seen in the left  vertebral artery.  The left common carotid origin is normal in  appearance.  No stenosis is seen in the left common carotid artery, its  bifurcation on the left,  There is coarsely calcified atherosclerotic  plaque involving the inferior wall of the left common carotid  bifurcation, but there is no stenosis in the left common carotid  bifurcation.  There is no stenosis in the  left internal carotid artery  using the NASCET criteria. Brachycephalic artery origin is normal in  appearance. No stenosis is seen in the brachycephalic artery, its  bifurcation into the right subclavian and common carotid artery is  normal in appearance, no stenosis is seen in the right subclavian  artery. The right vertebral artery origin is normal in appearance, no  stenosis is seen in the right vertebral artery from its origin to the  vertebrobasilar junction. The right common carotid origin is normal in  appearance, no stenosis is seen in the right common carotid artery, its  bifurcation into the right internal and external carotid arteries is  within normal limits and no stenosis is seen in the right internal  carotid artery using the NASCET criteria.     CT angiogram images through the head demonstrate a normal appearance to  the visualized intracranial segments of the distal vertebral arteries  and normal appearance to the basilar artery and the basilar tip as well  as the posterior cerebral and superior cerebellar arteries bilaterally.  The upper cervical, petrous, cavernous and supracavernous segments of  the internal carotid arteries are within normal limits. There is an  atretic A1 segment of the right anterior cerebral artery which is a  normal anatomic variation.  The dominant left A1 segment plaque supplies  both A2 segments via a normal-appearing anterior communicating artery.  The M1 segments and middle cerebral arteries and the middle cerebral  artery trifurcations are within normal limits.       Impression:       1. The findings in the cervical spine are similar to prior cervical  spine MRI from [facility] on 07/12/2013. There is  marked widening of the atlantodental interval up to 7 mm with bony  bridging between the posterior cortex in the anterior ring C1 and the  anterior aspect of the odontoid, and there is some bony bridging and  fusion across the right left lateral  masses of C1 and C2 and due to the  anterior translation of C1 on C2 there is moderate narrowing of the  anterior posterior diameter of the canal at the C1 ring level. This is  likely an old posttraumatic deformity and correlate with clinical  history.  Cervical spondylosis is present with posterior spurring and  uncovertebral joint hypertrophy contributing to mild canal and moderate  left foraminal narrowing at C5-6 which could affect the exiting left C6  nerve root  2. There is questionable very subtle faint 2 cm area of low-density in  the inferior lateral right frontal cortex corresponding to an area of  FLAIR hyperintensity on the MRA of the head 5 1/2 hours ago, last night,  01/23/2018 at 9:51 PM. The FLAIR images on the MRA of the head also  demonstrated FLAIR high signal in the right frontal sulci and lateral  left temporal occipital sulci. The etiology of the FLAIR hyperintensity  on the MRA 5 1/2 hours ago is uncertain, and I recommend an MRI of the  brain with and without contrast to further evaluate and it can be  correlated to recent MRI of the head 01/22/2018.  I also recommend  correlation with recent lumbar puncture results.  3. There are small bilateral posterior layering effusions and some  loculated fluid in the superior aspect of the left major fissure, right  subclavian line is in place and its tip is not evaluated on this exam.  4. CT angiogram of the head and neck is otherwise within normal limits  with no stenosis seen in the great vessels in the head or neck and no  intracranial aneurysm identified.     The results of this and recommendations from this study were  communicated to Dr. Endy Welch by telephone on 01/24/2018 at 7:30 AM.     Radiation dose reduction techniques were utilized, including automated  exposure control and exposure modulation based on body size.     This report was finalized on 1/24/2018 11:05 AM by Dr. Javier Rodrigues MD.       CT Angiogram Neck With & Without Contrast  [[id number]] Collected:  01/24/18 0818     Updated:  01/24/18 1108    Narrative:       CONTRAST-ENHANCED CT ANGIOGRAM OF THE HEAD AND NECK -  01/24/2018     HISTORY: Subarachnoid hemorrhage, evaluate for cerebral aneurysm and  vasospasm.     TECHNIQUE: Spiral CT images were obtained from the base of the skull to  the vertex both pre and post intravenous contrast, and images were  reformatted and submitted in 3 mm thick axial CT section with brain  algorithm, and additional spiral CT images were obtained from the top of  the aortic arch up through the great vessels in the head and neck during  the arterial phase of contrast, and images were reformatted and  submitted in 1 mm thick axial CT section, 1 mm thick sagittal and  coronal reconstructions and 3-D reconstructions were performed to  complete the CT angiogram of the head and neck.     There are no prior CTs of the head for comparison. This is correlated to  prior MRI of the brain on 01/22/2018 as well as MRA of the head on last  evening 01/23/2018 at 9:50 PM.     FINDINGS: There is a questionable very subtle 2 cm area of low-density  in the inferior lateral right frontal cortex and juxtacortical white  matter.  This is an equivocal finding seen on precontrast axial CT  images 30 through 33.  The remainder of the brain parenchyma is normal  in attenuation. The ventricles are normal in size. I see no mass effect  and no midline shift and no extra-axial fluid collections are  identified. There is no evidence of acute intracranial hemorrhage. No  abnormal areas of enhancement are seen in the head.  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 The oral  pharynx, hypopharynx, true cords and subglottic airway are normal in  appearance; there is heterogeneous enhancement in the thyroid gland. The  lung apices are clear, there is small bilateral posterior  layering  effusions and there is discoid area of low density projecting over the  superior aspect of the left major fissure that could be loculated fluid  in left major fissure. The parotid, , parapharyngeal,  submandibular spaces are symmetric and are normal in appearance. There  is an abnormal appearance to the C1-2 level with marked widening of the  atlantodental interval measuring 7 mm, and there appears to be bony  bridging from the posterior aspect of the anterior ring of C1 and the  anterior cortex of the odontoid and also some bony fusion across the  lateral masses of C1 and C2 bilaterally, and I suspect that this is  related to old trauma and as a result there is moderate narrowing of the  anterior posterior diameter of the canal at the C1 ring level. There is  disc space narrowing, degenerative endplate changes at C5-6, posterior  endplate spurring, mild canal narrowing and left uncovertebral joint  hypertrophy, moderate left bony foraminal narrowing at C5-6. There are  prominent osteoarthritic changes in the left glenohumeral joint with  joint space narrowing, moderate marginal osteophytic spurring and there  is extensive fatty marrow replacement in the visualized proximal left  humerus and a coarse area of calcification in the proximal left humeral  metaphysis and humeral neck in the medullary cavity that measures 17 mm  in size, could be calcification, old bone infarct or an enchondroma.     CT angiogram images demonstrate common origin of left common carotid  artery and brachiocephalic artery off the aortic arch constituting a  bovine configuration to the aortic arch which is a normal anatomic  variation. The left subclavian artery origin is normal in appearance, no  stenosis is seen in left subclavian artery. The left vertebral artery  origin is normal in appearance, no stenosis is seen in the left  vertebral artery.  The left common carotid origin is normal in  appearance.  No stenosis is  seen in the left common carotid artery, its  bifurcation on the left,  There is coarsely calcified atherosclerotic  plaque involving the inferior wall of the left common carotid  bifurcation, but there is no stenosis in the left common carotid  bifurcation.  There is no stenosis in the left internal carotid artery  using the NASCET criteria. Brachycephalic artery origin is normal in  appearance. No stenosis is seen in the brachycephalic artery, its  bifurcation into the right subclavian and common carotid artery is  normal in appearance, no stenosis is seen in the right subclavian  artery. The right vertebral artery origin is normal in appearance, no  stenosis is seen in the right vertebral artery from its origin to the  vertebrobasilar junction. The right common carotid origin is normal in  appearance, no stenosis is seen in the right common carotid artery, its  bifurcation into the right internal and external carotid arteries is  within normal limits and no stenosis is seen in the right internal  carotid artery using the NASCET criteria.     CT angiogram images through the head demonstrate a normal appearance to  the visualized intracranial segments of the distal vertebral arteries  and normal appearance to the basilar artery and the basilar tip as well  as the posterior cerebral and superior cerebellar arteries bilaterally.  The upper cervical, petrous, cavernous and supracavernous segments of  the internal carotid arteries are within normal limits. There is an  atretic A1 segment of the right anterior cerebral artery which is a  normal anatomic variation.  The dominant left A1 segment plaque supplies  both A2 segments via a normal-appearing anterior communicating artery.  The M1 segments and middle cerebral arteries and the middle cerebral  artery trifurcations are within normal limits.       Impression:       1. The findings in the cervical spine are similar to prior cervical  spine MRI from [facility]  [place] on 07/12/2013. There is  marked widening of the atlantodental interval up to 7 mm with bony  bridging between the posterior cortex in the anterior ring C1 and the  anterior aspect of the odontoid, and there is some bony bridging and  fusion across the right left lateral masses of C1 and C2 and due to the  anterior translation of C1 on C2 there is moderate narrowing of the  anterior posterior diameter of the canal at the C1 ring level. This is  likely an old posttraumatic deformity and correlate with clinical  history.  Cervical spondylosis is present with posterior spurring and  uncovertebral joint hypertrophy contributing to mild canal and moderate  left foraminal narrowing at C5-6 which could affect the exiting left C6  nerve root  2. There is questionable very subtle faint 2 cm area of low-density in  the inferior lateral right frontal cortex corresponding to an area of  FLAIR hyperintensity on the MRA of the head 5 1/2 hours ago, last night,  01/23/2018 at 9:51 PM. The FLAIR images on the MRA of the head also  demonstrated FLAIR high signal in the right frontal sulci and lateral  left temporal occipital sulci. The etiology of the FLAIR hyperintensity  on the MRA 5 1/2 hours ago is uncertain, and I recommend an MRI of the  brain with and without contrast to further evaluate and it can be  correlated to recent MRI of the head 01/22/2018.  I also recommend  correlation with recent lumbar puncture results.  3. There are small bilateral posterior layering effusions and some  loculated fluid in the superior aspect of the left major fissure, right  subclavian line is in place and its tip is not evaluated on this exam.  4. CT angiogram of the head and neck is otherwise within normal limits  with no stenosis seen in the great vessels in the head or neck and no  intracranial aneurysm identified.     The results of this and recommendations from this study were  communicated to Dr. Endy Welch by telephone on  01/24/2018 at 7:30 AM.     Radiation dose reduction techniques were utilized, including automated  exposure control and exposure modulation based on body size.     This report was finalized on 1/24/2018 11:05 AM by Dr. Javier Rodrigues MD.       MRI Lumbar Spine With & Without Contrast [[id number]] Collected:  01/24/18 0827     Updated:  01/24/18 1408    Narrative:       MRI LUMBAR SPINE WITH AND WITHOUT CONTRAST     HISTORY:  Back pain. Confusion.     COMPARISON: No prior MRI of the lumbar spine is available for  comparison.     FINDINGS:     The study is hampered by patient motion. The alignment of the lumbar  spine is within normal limits. There is mild loss of disc height at  L4-L5 and L5-S1. The conus is at L1.     There is increased signal intensity involving the posterior paraspinal  musculature symmetrical extending from L1 to S1, nonspecific. There is a  small amount fluid present within the facet joints at L3-L4 and L4-L5  bilaterally.     There is heterogeneous appearance of the marrow diffusely with decreased  signal intensity involving the anterior aspect of T1 sequences without  any incident. This extends to and involves the upper thoracic spine. The  appearance of the upper thoracic spine is similar to the MRI examination  of the cervical spine performed on 07/12/2013.      L1-L2: Mild facet degenerative disease is present bilaterally.     L2-L3: There is no osseous bulge or herniation.     L3-L4: Mild facet degenerative disease is present bilaterally. There is  no evidence of disc bulge or herniation.     L4-L5:  Moderate facet degenerative disease is present bilaterally.  There is a broad-based disc osteophyte complex resulting in mild  flattening of ventral surface of the thecal sac. There is mild neural  foraminal compromise bilaterally secondary to extension of a small disc  osteophyte complex into the neural foramen.     L5-S1: Moderate facet degenerative disease is present bilaterally. There  is a  broad-based disc osteophyte complex resulting in mild flattening of  the ventral surface of the thecal sac, slightly more prominent to the  right.     The sagittal T1 postcontrast sequence demonstrates enhancement of the  cauda equina and a mildly increased signal intensity involving the CSF  within the inferior aspect of the thecal sac. There is also an epidural  fluid collection posterior to L5-S1. This measures approximately 11 mm  in AP dimension and 2 mm in AP dimension and is located at the midline.  There is enhancement of the posterior paraspinal musculature from L1-L2  to S1.     The axial T2 sequence demonstrates abnormal signal adjacent to the  kidneys bilaterally extending inferiorly, only partially visualized  suggesting perirenal fluid, not present on the CT examination of  01/20/2018.       Impression:       1.  The study is grossly abnormal with enhancement of cauda equina,  intermediate signal within the thecal sac and dural enhancement,  nonspecific. The intermediate signal may be secondary to blood products  although infection cannot be excluded. There is a thin plane of fluid  posterior to the thecal sac at L5-S1. The appearance is nonspecific.  This may be secondary to a small seroma or posttraumatic fluid  collection although infection or abscess cannot be excluded. There is  edema and enhancement involving the paraspinal musculature bilaterally.  There is no evidence of edema or enhancement involving the vertebral  bodies or disc spaces. Fluid is present within the facet joints at L3-L4  and L4-L5 bilaterally. This may be degenerative in nature although  infected facets cannot be excluded.  2.  Abnormal signal in the perirenal space bilaterally new versus the CT  examination 01/20/2018. This is only partially visualized but may be  secondary to third spacing.     The above information was called to and discussed with Dr. Welch.     This report was finalized on 1/24/2018 2:04 PM by Dr. Carrera  MD Rehan.       MRI Angiogram Head Without Contrast [[id number]] Collected:  01/24/18 0013     Updated:  01/24/18 2353    Narrative:       MRI ANGIOGRAM HEAD - WITHOUT GADOLINIUM INTRAVENOUS CONTRAST.     TECHNIQUE: Routine brain MRA was performed. 3-D time-of-flight technique  was used without intravenous contrast. Source and MIP images were  reviewed.      HISTORY: Neurological change, weakness.     COMPARISON: 01/22/2018.     FINDINGS:     Abnormal gyral high signal is seen on the diffusion sequence is in the  right frontal lobe, anterior right temporal lobe and left temporal lobe.  Findings of small vessel ischemic disease, a few old lacunar infarcts  and cortical atrophy.     Bilateral internal carotid arteries are symmetric and patent. Their  bifurcations are symmetric and patent.      Anterior cerebral arteries are patent. The middle cerebral arteries are  patent.      Distal vertebral arteries are patent. The posterior inferior cerebellar  arteries (PICAs) are symmetric and patent.      The basilar artery is continuously patent. The superior cerebellar  arteries are symmetric and patent.      The P1 segments are patent. The posterior cerebral arteries (PCAs) are  patent. The posterior communicating arteries are symmetric and patent.      1 cm on Tornwaldt cyst is suspected, further evaluation is recommended.       Impression:       Moderate amount of subarachnoid hemorrhage layering the right frontal  lobe and both temporal lobes. Differential diagnoses includes  encephalitis.  1 cm on Tornwaldt cyst is suspected, further evaluation with CT scan or  MRI of the head and neck may be performed.     Findings called to patient's nurse Ms. Cortez, 12:13 AM.     This report was finalized on 1/24/2018 11:50 PM by Dr. Mayelin Carter MD.       MRI Thoracic Spine With & Without Contrast [[id number]] Collected:  01/24/18 0047     Updated:  01/24/18 2353    Narrative:       MRI OF THE THORACIC SPINE WITHOUT AND WITH  CONTRAST.     TECHNIQUE: Multisequence multiplanar MRI images of the thoracic spine.     HISTORY: Mid back thoracic pain.     COMPARISON: No prior studies for comparison.     FINDINGS:  Vertebral body height and signal is normal, no acute fracture.   Intervertebral discs demonstrates mild desiccation changes in the  midthoracic spine. Small disc osteophyte is seen at T3-4 causing mild  spinal canal stenosis.     Thoracic spinal cord demonstrates normal caliber, no atrophy or  swelling.     Small left pleural effusion is incidentally noted.       Impression:       Mild spondylosis of the thoracic spine, small disc osteophyte complex at  T3-4. No fracture or dislocation.  Small left pleural effusion.     This report was finalized on 1/24/2018 11:50 PM by Dr. Mayelin Carter MD.       MRI Brain With & Without Contrast [[id number]] Collected:  01/24/18 1058     Updated:  01/25/18 0653    Narrative:       MRI EXAMINATION OF THE BRAIN WITH AND WITHOUT CONTRAST     HISTORY: Confusion, altered mental status.     COMPARISON: MRI of the brain 01/22/2018, MRA 01/23/2018 and CT angiogram  01/24/2018.     FINDINGS: There is no evidence of restricted diffusion to suggest acute  infarction.     The axial T2 FLAIR sequence demonstrates increased signal intensity  involving the sulci of the right frontal lobe laterally as well as  extending superiorly to the vertex, more prominent as compared to the T2  FLAIR examination performed on 01/22/2018. There is also increased  signal intensity involving the cortex of the right frontal lobe  laterally evident on the T2 and T2 FLAIR sequences. A similar process is  appreciated involving the left temporal lobe laterally, smaller in area.  There is increased signal intensity involving the sulci involving the  parietal lobes bilaterally, present previously.     There is increased signal intensity appreciated involving the basal  ganglion on the right inferiorly on the T2 FLAIR sequence. This  area  measures approximately 9 mm in transverse dimension and is more  prominent as compared to the prior examination. There are small foci of  increased signal intensity appreciated involving the white matter of the  cerebellar hemispheres bilaterally on the T2 FLAIR sequence which are  slightly more prominent as compared to prior examination.     The axial T2 FLAIR sequence demonstrates increased signal intensity  involving the sulci overlying the left frontal lobe laterally which is  new versus the MRI examination of 01/23/2018.     There is mild prominence of the lateral ventricles, similar in  appearance compared to prior examination.     The axial T1 precontrast sequence demonstrates a mild degree of  increased signal intensity involving the right frontal lobe laterally.  This may be related to the contrast which was administered on  01/22/2018. Petechial hemorrhage could also be considered but is less  likely. There is no evidence of signal loss on the susceptibility  weighted imaging at this location.     After contrast administration there was increased signal intensity  involving the cortex as well as sulci of the right frontal lobe  laterally and involving the left temporal lobe laterally to a lesser  extent. There is faint enhancement involving sulci in the left temporal  region laterally. There is enhancement involving the inferior aspect of  the basal ganglia on the right (approximately 6 mm) as well as  enhancement involving the left cerebellar hemisphere centrally (4-5 mm  in size). This cerebellar enhancement was not present previously. The  enhancement involving the basal ganglia on the right inferiorly was not  present previously.       Impression:       Complex and unusual presentation with multifocal areas of  sulcal T2 FLAIR hyperintensity as well as areas of T2 and T2 FLAIR  hyperintensity involving the right frontal lobe laterally, left temporal  lobe laterally, basal ganglia inferiorly and  left cerebellar hemisphere  centrally. These areas are more prominent as compared to the prior  examination and there is enhancement involving the left cerebral  hemisphere, basal ganglia on the right inferiorly and sulcal  hyperintensity evident on the postcontrast sequence as well. The  multivascular distribution suggests a multifocal process with breakdown  of the blood-brain barrier. This may be secondary to an inflammatory  infectious process such as meningitis or encephalitis. However no  convincing areas of T2 FLAIR hyperintensity are noted within the  dependent portions of the ventricular system or involving the basilar  cisterns. A vasculitis could also be considered. There is mild  prominence of the lateral ventricles, unchanged. There is no evidence of  restricted diffusion to suggest acute infarction.     The above information was called to and discussed with Dr. Welch.     This report was finalized on 1/25/2018 6:50 AM by Dr. Deandre Van MD.       IR Lumbar Puncture Diagnosis [[id number]] Collected:  01/25/18 1129     Updated:  01/25/18 1150    Narrative:       FLUOROSCOPIC GUIDED LUMBAR PUNCTURE  - 01/25/2018.     HISTORY: Altered mental status.     After signed informed consent was obtained, the patient was prepped and  draped in the prone position. Lidocaine was used for local anesthesia.     An 18-gauge needle was introduced into the thecal sac at a midlumbar  level by Dr. Tejeda. Only a very tiny amount of slightly bloody CSF  was visualized. The needle was repositioned into the thecal sac and  still no significant fluid could be returned. Two additional upper to  mid lumbar levels were attempted and only approximately 1 mL to 2 mL of  slightly bloody CSF could be sampled.     Opening pressure is less than 1 cm to 2 cm.       Impression:       Limited CSF could be sampled during the lumbar puncture as  discussed above. Findings were discussed with Dr. Welch.      Fluoroscopy time 4 minutes 39  seconds. 4 images.        XR Chest 1 View [[id number]] Collected:  01/25/18 1832     Updated:  01/25/18 1837    Narrative:       PORTABLE CHEST 1/25/2018 AT 1820 HOURS.     CLINICAL HISTORY: Evaluate ET tube placement.     Compared to the chest x-ray performed earlier today 1415 hours.     In the interval, an endotracheal tube has been inserted and appears in  satisfactory position with its tip couple centimeters above the mary.  A PICC line in the right upper extremity remains in satisfactory  position without change. The lungs are better inflated. There is minimal  increased density in the lingula of the left upper lobe producing  partial obscuration of the left heart border that appears unchanged. The  right lung remains clear. The heart is normal in size.     This report was finalized on 1/25/2018 6:34 PM by Dr. Tim Christie MD.       XR Chest 1 View [[id number]] Collected:  01/25/18 1455     Updated:  01/25/18 2118    Narrative:       PORTABLE CHEST X-RAY     HISTORY: Shortness of breath with exertion.     Portable chest x-ray is provided and correlated with CT chest dated  01/20/2018 and chest x-ray 07/25/2013.     FINDINGS: There is a right PICC line with its tip in the mid to lower  portion of the superior vena cava. The cardiomediastinal silhouette  appears normal. Vascular volume is normal. There may be tiny pleural  effusions blunting the costophrenic angles. The lungs appear clear  except for some subtle increased density laterally in the left lung base  which corresponds to the infiltrate seen in the lingula on the recent  CT. There is no pneumothorax. Advanced degenerative change is observed  at each shoulder. There also appear to be chronic full-thickness rotator  cuff tears.       Impression:       PICC line has its tip in the superior vena cava. Some subtle  density in the lingula corresponds to infiltrate seen in that area on CT  5 days ago. The lungs are otherwise clear. There appear to be  small  pleural effusions bilaterally.     This report was finalized on 1/25/2018 9:15 PM by Dr. Raad Toth MD.       XR Abdomen KUB [[id number]] Collected:  01/25/18 2322     Updated:  01/25/18 2322    Narrative:       X-RAY ABDOMEN ONE VIEW KUB.     HISTORY:  Cortrak placement.     COMPARISON:  No prior studies for comparison.     FINDINGS:   Bowel gas pattern is unremarkable. No abnormal calcifications are seen.     No free air in the abdomen. Tip of the Cortrak tube is in the stomach.       Impression:       No acute findings in the abdomen. Tip of the Cortrak tube is in the  stomach.                 Lab Results (last 7 days)     Procedure Component Value Units Date/Time    Lactic Acid, Plasma [[id number]]  (Abnormal) Collected:  01/19/18 1934    Specimen:  Blood Updated:  01/19/18 2028     Lactate 2.1 (C) mmol/L     Lactic Acid, Reflex Timer (This will reflex a repeat order 3-3:15 hours after ordered.) [[id number]] Collected:  01/19/18 1934    Specimen:  Blood Updated:  01/19/18 2331     Extra Tube Hold for add-ons.      Auto resulted.       Lactic Acid, Reflex [[id number]]  (Normal) Collected:  01/20/18 0029    Specimen:  Blood Updated:  01/20/18 0051     Lactate 1.7 mmol/L     Urinalysis - Urine, Clean Catch [[id number]]  (Abnormal) Collected:  01/20/18 0219    Specimen:  Urine from Urine, Clean Catch Updated:  01/20/18 0240     Color, UA Yellow     Appearance, UA Clear     pH, UA 6.5     Specific Gravity, UA 1.011     Glucose, UA Negative     Ketones, UA Negative     Bilirubin, UA Negative     Blood, UA Trace (A)     Protein,  mg/dL (2+) (A)     Leuk Esterase, UA Negative     Nitrite, UA Negative     Urobilinogen, UA 1.0 E.U./dL    Sodium, Urine, Random - [[id number]] Collected:  01/20/18 0220    Specimen:  Urine Updated:  01/20/18 0246     Sodium, Urine 75 mmol/L     Narrative:       Reference intervals for random urine have not been established.  Clinical usage is dependent upon physician's  interpretation in combination with other laboratory tests.     Creatinine, Urine, Random - Urine, Clean Catch [[id number]] Collected:  01/20/18 0219    Specimen:  Urine from Urine, Clean Catch Updated:  01/20/18 0256     Creatinine, Urine 40.1 mg/dL     Narrative:       Reference intervals for random urine have not been established.  Clinical usage is dependent upon physician's interpretation in combination with other laboratory tests.     Renal Function Panel [[id number]]  (Abnormal) Collected:  01/20/18 0445    Specimen:  Blood Updated:  01/20/18 0540     Glucose 69 mg/dL      BUN 48 (H) mg/dL      Creatinine 1.24 (H) mg/dL      Sodium 135 (L) mmol/L      Potassium 4.7 mmol/L      Chloride 101 mmol/L      CO2 21.6 (L) mmol/L      Calcium 8.0 (L) mg/dL      Albumin 2.00 (L) g/dL      Phosphorus 4.3 mg/dL      Anion Gap 12.4 mmol/L      BUN/Creatinine Ratio 38.7 (H)     eGFR   Amer 53 (L) mL/min/1.73     CBC & Differential [[id number]] Collected:  01/20/18 0602    Specimen:  Blood Updated:  01/20/18 0716    Narrative:       The following orders were created for panel order CBC & Differential.  Procedure                               Abnormality         Status                     ---------                               -----------         ------                     Scan Slide[[id number]]                                       Final result               CBC Auto Differential[[id number]]        Abnormal            Final result                 Please view results for these tests on the individual orders.    CBC Auto Differential [[id number]]  (Abnormal) Collected:  01/20/18 0602    Specimen:  Blood Updated:  01/20/18 0716     WBC 4.88 10*3/mm3      RBC 3.31 (L) 10*6/mm3      Hemoglobin 7.0 (L) g/dL      Hematocrit 22.9 (L) %      MCV 69.2 (L) fL      MCH 21.1 (L) pg      MCHC 30.6 (L) g/dL      RDW 19.9 (H) %      RDW-SD 49.3 fl      Platelets 126 (L) 10*3/mm3      Neutrophil % 50.5 %      Lymphocyte % 42.4 %       "Monocyte % 5.7 %      Eosinophil % 0.4 %      Basophil % 0.2 %      Immature Grans % 0.8 (H) %      Neutrophils, Absolute 2.46 10*3/mm3      Lymphocytes, Absolute 2.07 10*3/mm3      Monocytes, Absolute 0.28 10*3/mm3      Eosinophils, Absolute 0.02 10*3/mm3      Basophils, Absolute 0.01 10*3/mm3      Immature Grans, Absolute 0.04 (H) 10*3/mm3     Scan Slide [[id number]] Collected:  01/20/18 0602    Specimen:  Blood Updated:  01/20/18 0716     Anisocytosis Mod/2+     Hypochromia Mod/2+     Macrocytes Mod/2+     RBC Fragments Slight/1+     Target Cells Slight/1+     WBC Morphology Normal     Platelet Morphology Normal    C-reactive Protein [[id number]]  (Abnormal) Collected:  01/20/18 0445    Specimen:  Blood Updated:  01/20/18 0941     C-Reactive Protein 10.72 (H) mg/dL     Cortisol [[id number]]  (Normal) Collected:  01/20/18 0445    Specimen:  Blood Updated:  01/20/18 0946     Cortisol 7.97 mcg/dL     Narrative:       Cortisol Reference Ranges:    Cortisol 6AM - 10AM Range: 6.02-18.40 mcg/dl  Cortisol 4PM - 8PM Range: 2.68-10.50 mcg/dl  Critical AM/PM:    Less than 2 mcg/dl    Procalcitonin [[id number]]  (Abnormal) Collected:  01/20/18 0445    Specimen:  Blood Updated:  01/20/18 0958     Procalcitonin 1.01 (C) ng/mL     Narrative:       As a Marker for Sepsis (Non-Neonates):   1. <0.5 ng/mL represents a low risk of severe sepsis and/or septic shock.  1. >2 ng/mL represents a high risk of severe sepsis and/or septic shock.    As a Marker for Lower Respiratory Tract Infections that require antibiotic therapy:  PCT on Admission     Antibiotic Therapy             6-12 Hrs later  > 0.5                Strongly Recommended            >0.25 - <0.5         Recommended  0.1 - 0.25           Discouraged                   Remeasure/reassess PCT  <0.1                 Strongly Discouraged          Remeasure/reassess PCT      As 28 day mortality risk marker: \"Change in Procalcitonin Result\" (> 80 % or <=80 %) if Day 0 (or Day 1) and " Day 4 values are available. Refer to [email or web address]   Change in PCT <=80 %   A decrease of PCT levels below or equal to 80 % defines a positive change in PCT test result representing a higher risk for 28-day all-cause mortality of patients diagnosed with severe sepsis or septic shock.  Change in PCT > 80 %   A decrease of PCT levels of more than 80 % defines a negative change in PCT result representing a lower risk for 28-day all-cause mortality of patients diagnosed with severe sepsis or septic shock.                Respiratory Panel, PCR - Swab, Nasopharynx [[id number]]  (Normal) Collected:  01/20/18 1127    Specimen:  Swab from Nasopharynx Updated:  01/20/18 1324     ADENOVIRUS, PCR Not Detected     Coronavirus 229E Not Detected     Coronavirus HKU1 Not Detected     Coronavirus NL63 Not Detected     Coronavirus OC43 Not Detected     Human Metapneumovirus Not Detected     Human Rhinovirus/Enterovirus Not Detected     Influenza B PCR Not Detected     Parainfluenza Virus 1 Not Detected     Parainfluenza Virus 2 Not Detected     Parainfluenza Virus 3 Not Detected     Parainfluenza Virus 4 Not Detected     Bordetella pertussis pcr Not Detected     Influenza A H1N1 2009 PCR Not Detected     Chlamydophila pneumoniae PCR Not Detected     Mycoplasma pneumo by PCR Not Detected     Influenza A PCR Not Detected     Influenza A H3 Not Detected     Influenza A H1 Not Detected     RSV, PCR Not Detected    Hemoglobin & Hematocrit, Blood [[id number]]  (Abnormal) Collected:  01/20/18 1725    Specimen:  Blood Updated:  01/20/18 1743     Hemoglobin 7.1 (L) g/dL      Hematocrit 24.0 (L) %     Comprehensive Metabolic Panel [[id number]]  (Abnormal) Collected:  01/21/18 0541    Specimen:  Blood Updated:  01/21/18 0648     Glucose 80 mg/dL      BUN 34 (H) mg/dL      Creatinine 1.14 (H) mg/dL      Sodium 135 (L) mmol/L      Potassium 4.1 mmol/L      Chloride 97 (L) mmol/L      CO2 25.4 mmol/L      Calcium 7.7 (L) mg/dL       Total Protein 6.7 g/dL      Albumin 1.90 (L) g/dL      ALT (SGPT) 20 U/L      AST (SGOT) 57 (H) U/L      Alkaline Phosphatase 392 (H) U/L      Total Bilirubin 0.4 mg/dL      eGFR   Amer 58 (L) mL/min/1.73      Globulin 4.8 gm/dL      A/G Ratio 0.4 g/dL      BUN/Creatinine Ratio 29.8 (H)     Anion Gap 12.6 mmol/L     CBC (No Diff) [[id number]]  (Abnormal) Collected:  01/21/18 0541    Specimen:  Blood Updated:  01/21/18 0721     WBC 5.23 10*3/mm3      RBC 3.17 (L) 10*6/mm3      Hemoglobin 6.6 (C) g/dL      Hematocrit 21.9 (L) %      MCV 69.1 (L) fL      MCH 20.8 (L) pg      MCHC 30.1 (L) g/dL      RDW 19.6 (H) %      RDW-SD 47.8 fl      Platelets 121 (L) 10*3/mm3     HIV-1 & HIV-2 Antibodies [[id number]] Collected:  01/21/18 0541    Specimen:  Blood Updated:  01/21/18 0724    Narrative:       The following orders were created for panel order HIV-1 & HIV-2 Antibodies.  Procedure                               Abnormality         Status                     ---------                               -----------         ------                     HIV-1 / O / 2 Ag / Antib...[[id number]]  Normal              Final result                 Please view results for these tests on the individual orders.    HIV-1 / O / 2 Ag / Antibody 4th Generation [[id number]]  (Normal) Collected:  01/21/18 0541    Specimen:  Blood Updated:  01/21/18 0724     HIV-1/ HIV-2 Non-Reactive     HIV-1 P24 Ag Screen Non-Reactive    Hepatitis C Antibody [[id number]]  (Normal) Collected:  01/21/18 0541    Specimen:  Blood Updated:  01/21/18 0922     Hepatitis C Ab Non-Reactive    Lactate Dehydrogenase [[id number]]  (Abnormal) Collected:  01/21/18 0541    Specimen:  Blood Updated:  01/21/18 0955      (H) U/L     Glucose, CSF - Cerebrospinal Fluid, Lumbar Puncture [[id number]]  (Normal) Collected:  01/21/18 1453    Specimen:  Cerebrospinal Fluid from Lumbar Puncture Updated:  01/21/18 1727     Glucose, CSF 45 mg/dL     Protein, CSF - Cerebrospinal  Fluid, Lumbar Puncture [[id number]]  (Abnormal) Collected:  01/21/18 1453    Specimen:  Cerebrospinal Fluid from Lumbar Puncture Updated:  01/21/18 1727     Protein, Total (CSF) 48.0 (H) mg/dL     Cell Count, CSF - Cerebrospinal Fluid, Lumbar Puncture [[id number]]  (Abnormal) Collected:  01/21/18 1453    Specimen:  Cerebrospinal Fluid from Lumbar Puncture Updated:  01/21/18 1751     Color, CSF Pink (A)     Appearance, CSF Slightly Hazy (A)     RBC, CSF 1640 (H) /mm3      Nucleated Cells, CSF 11 (H) /mm3      Tube Number, CSF 1    Cell Count With Differential, CSF Use tube: 1 [[id number]] Collected:  01/21/18 1453    Specimen:  Cerebrospinal Fluid from Lumbar Puncture Updated:  01/21/18 1751    Narrative:       The following orders were created for panel order Cell Count With Differential, CSF Use tube: 1.  Procedure                               Abnormality         Status                     ---------                               -----------         ------                     Cell Count, CSF - Cerebr...[[id number]]  Abnormal            Final result               Spinal fluid differentia...[[id number]]                      Final result                 Please view results for these tests on the individual orders.    Spinal fluid differential - Cerebrospinal Fluid, Lumbar Puncture [[id number]] Collected:  01/21/18 1453    Specimen:  Cerebrospinal Fluid from Lumbar Puncture Updated:  01/21/18 1751     Neutrophils, CSF 2 %      Lymphocytes, CSF 86 %      Monocytes, CSF 5 %      Mononuclear, CSF 7 %      Other Cells, CSF 1 /100 WBCs     Hemoglobin & Hematocrit, Blood [[id number]]  (Abnormal) Collected:  01/21/18 1937    Specimen:  Blood Updated:  01/21/18 2008     Hemoglobin 8.0 (L) g/dL      Hematocrit 25.4 (L) %     Magnesium [[id number]]  (Normal) Collected:  01/21/18 1937    Specimen:  Blood Updated:  01/21/18 2023     Magnesium 1.9 mg/dL     Comprehensive Metabolic Panel [[id number]]  (Abnormal) Collected:  01/22/18 3221     Specimen:  Blood Updated:  01/22/18 0645     Glucose 78 mg/dL      BUN 32 (H) mg/dL      Creatinine 1.13 (H) mg/dL      Sodium 134 (L) mmol/L      Potassium 3.7 mmol/L      Chloride 98 mmol/L      CO2 21.7 (L) mmol/L      Calcium 7.7 (L) mg/dL      Total Protein 6.7 g/dL      Albumin 1.90 (L) g/dL      ALT (SGPT) 19 U/L      AST (SGOT) 56 (H) U/L      Alkaline Phosphatase 346 (H) U/L      Total Bilirubin 0.4 mg/dL      eGFR   Amer 59 (L) mL/min/1.73      Globulin 4.8 gm/dL      A/G Ratio 0.4 g/dL      BUN/Creatinine Ratio 28.3 (H)     Anion Gap 14.3 mmol/L     CBC (No Diff) [[id number]]  (Abnormal) Collected:  01/22/18 0455    Specimen:  Blood Updated:  01/22/18 0649     WBC 3.85 (L) 10*3/mm3      RBC 3.62 (L) 10*6/mm3      Hemoglobin 8.0 (L) g/dL      Hematocrit 26.1 (L) %      MCV 72.1 (L) fL      MCH 22.1 (L) pg      MCHC 30.7 (L) g/dL      RDW 20.8 (H) %      RDW-SD 53.6 fl      MPV -- fL       .        Platelets 120 (L) 10*3/mm3     RPR [[id number]]  (Normal) Collected:  01/21/18 0541    Specimen:  Blood Updated:  01/22/18 0724     RPR Non-Reactive    Ferritin [[id number]]  (Abnormal) Collected:  01/22/18 0455    Specimen:  Blood Updated:  01/22/18 1342     Ferritin 1812.00 (H) ng/mL     Procalcitonin [[id number]]  (Abnormal) Collected:  01/22/18 0455    Specimen:  Blood Updated:  01/22/18 1345     Procalcitonin 0.94 (C) ng/mL     Narrative:       As a Marker for Sepsis (Non-Neonates):   1. <0.5 ng/mL represents a low risk of severe sepsis and/or septic shock.  1. >2 ng/mL represents a high risk of severe sepsis and/or septic shock.    As a Marker for Lower Respiratory Tract Infections that require antibiotic therapy:  PCT on Admission     Antibiotic Therapy             6-12 Hrs later  > 0.5                Strongly Recommended            >0.25 - <0.5         Recommended  0.1 - 0.25           Discouraged                   Remeasure/reassess PCT  <0.1                 Strongly Discouraged           "Remeasure/reassess PCT      As 28 day mortality risk marker: \"Change in Procalcitonin Result\" (> 80 % or <=80 %) if Day 0 (or Day 1) and Day 4 values are available. Refer to [email or web address]   Change in PCT <=80 %   A decrease of PCT levels below or equal to 80 % defines a positive change in PCT test result representing a higher risk for 28-day all-cause mortality of patients diagnosed with severe sepsis or septic shock.  Change in PCT > 80 %   A decrease of PCT levels of more than 80 % defines a negative change in PCT result representing a lower risk for 28-day all-cause mortality of patients diagnosed with severe sepsis or septic shock.                Rheumatoid Factor, Quant [[id number]]  (Abnormal) Collected:  01/22/18 0455    Specimen:  Blood Updated:  01/22/18 1534     Rheumatoid Factor Quantitative 42.6 (H) IU/mL     Comprehensive Metabolic Panel [[id number]]  (Abnormal) Collected:  01/23/18 0542    Specimen:  Blood Updated:  01/23/18 0657     Glucose 73 mg/dL      BUN 26 (H) mg/dL      Creatinine 1.04 (H) mg/dL      Sodium 137 mmol/L      Potassium 3.4 (L) mmol/L      Chloride 105 mmol/L      CO2 20.6 (L) mmol/L      Calcium 6.8 (L) mg/dL      Total Protein 5.7 (L) g/dL      Albumin 1.40 (L) g/dL      ALT (SGPT) 14 U/L      AST (SGOT) 40 (H) U/L      Alkaline Phosphatase 268 (H) U/L      Total Bilirubin 0.4 mg/dL      eGFR  African Amer 65 mL/min/1.73      Globulin 4.3 gm/dL      A/G Ratio 0.3 g/dL      BUN/Creatinine Ratio 25.0     Anion Gap 11.4 mmol/L     CBC (No Diff) [[id number]]  (Abnormal) Collected:  01/23/18 0542    Specimen:  Blood Updated:  01/23/18 0722     WBC 3.39 (L) 10*3/mm3      RBC 3.39 (L) 10*6/mm3      Hemoglobin 7.4 (L) g/dL      Hematocrit 24.2 (L) %      MCV 71.4 (L) fL      MCH 21.8 (L) pg      MCHC 30.6 (L) g/dL      RDW 21.4 (H) %      RDW-SD 54.6 (H) fl      MPV -- fL       .        Platelets 98 (L) 10*3/mm3     Lipid Panel [[id number]]  (Abnormal) Collected:  " 01/24/18 0249    Specimen:  Blood Updated:  01/24/18 0345     Total Cholesterol 102 mg/dL      Triglycerides 347 (H) mg/dL      HDL Cholesterol 9 (L) mg/dL      LDL Cholesterol  24 mg/dL      VLDL Cholesterol 69.4 (H) mg/dL      LDL/HDL Ratio 2.62    Narrative:       Cholesterol Reference Ranges  (U.S. Department of Health and Human Services ATP III Classifications)    Desirable          <200 mg/dL  Borderline High    200-239 mg/dL  High Risk          >240 mg/dL      Triglyceride Reference Ranges  (U.S. Department of Health and Human Services ATP III Classifications)    Normal           <150 mg/dL  Borderline High  150-199 mg/dL  High             200-499 mg/dL  Very High        >500 mg/dL    HDL Reference Ranges  (U.S. Department of Health and Human Services ATP III Classifcations)    Low     <40 mg/dl (major risk factor for CHD)  High    >60 mg/dl ('negative' risk factor for CHD)        LDL Reference Ranges  (U.S. Department of Health and Human Services ATP III Classifcations)    Optimal          <100 mg/dL  Near Optimal     100-129 mg/dL  Borderline High  130-159 mg/dL  High             160-189 mg/dL  Very High        >189 mg/dL    Basic Metabolic Panel [[id number]]  (Abnormal) Collected:  01/24/18 0249    Specimen:  Blood Updated:  01/24/18 0345     Glucose 80 mg/dL      BUN 28 (H) mg/dL      Creatinine 1.04 (H) mg/dL      Sodium 143 mmol/L      Potassium 3.2 (L) mmol/L      Chloride 113 (H) mmol/L      CO2 18.3 (L) mmol/L      Calcium 6.6 (L) mg/dL      eGFR  African Amer 65 mL/min/1.73      BUN/Creatinine Ratio 26.9 (H)     Anion Gap 11.7 mmol/L     Narrative:       GFR Normal >60  Chronic Kidney Disease <60  Kidney Failure <15    TSH [[id number]]  (Normal) Collected:  01/24/18 0249    Specimen:  Blood Updated:  01/24/18 0351     TSH 0.295 mIU/mL     CBC (No Diff) [[id number]]  (Abnormal) Collected:  01/24/18 0249    Specimen:  Blood Updated:  01/24/18 0354     WBC 2.75 (L) 10*3/mm3      RBC 3.35 (L) 10*6/mm3       Hemoglobin 7.3 (L) g/dL      Hematocrit 24.3 (L) %      MCV 72.5 (L) fL      MCH 21.8 (L) pg      MCHC 30.0 (L) g/dL      RDW 22.2 (H) %      RDW-SD 57.8 (H) fl      MPV -- fL       .        Platelets 69 (L) 10*3/mm3     Hemoglobin A1c [[id number]]  (Normal) Collected:  01/24/18 0249    Specimen:  Blood Updated:  01/24/18 0426     Hemoglobin A1C 5.60 %     Narrative:       Hemoglobin A1C Ranges:    Increased Risk for Diabetes  5.7% to 6.4%  Diabetes                     >= 6.5%  Diabetic Goal                < 7.0%    Histoplasma Ag Ur - Urine, Clean Catch [[id number]] Collected:  01/20/18 1343    Specimen:  Urine from Urine, Clean Catch Updated:  01/24/18 0512     Reference Lab Report See attached report    Narrative:       See original report from [facility].    Culture, CSF - Cerebrospinal Fluid, Lumbar Puncture [[id number]]  (Normal) Collected:  01/21/18 1453    Specimen:  Cerebrospinal Fluid from Lumbar Puncture Updated:  01/24/18 0925     CSF Culture No growth at 3 days     Gram Stain Result No WBCs or organisms seen    Respiratory Culture - Sputum, Cough [[id number]] Collected:  01/21/18 2147    Specimen:  Sputum from Cough Updated:  01/24/18 1101     Respiratory Culture --      Light growth (2+) Normal Respiratory Mariella     Gram Stain Result Few (2+) WBCs seen      Few (2+) Epithelial cells per low power field      Mixed bacterial morphotypes seen on Gram Stain    Blood Culture - Blood, Blood, Venous Line [[id number]]  (Normal) Collected:  01/19/18 1934    Specimen:  Blood from Blood, Venous Line Updated:  01/24/18 2001     Blood Culture No growth at 5 days    Blood Culture - Blood, Blood, Venous Line [[id number]]  (Normal) Collected:  01/19/18 2202    Specimen:  Blood from Blood, Venous Line Updated:  01/24/18 2216     Blood Culture No growth at 5 days    HSV 1/2, PCR - Cerebrospinal Fluid, Lumbar Puncture [[id number]] Collected:  01/21/18 1453    Specimen:  Cerebrospinal Fluid from Lumbar Puncture  Updated:  01/25/18 0618     HSV-1 DNA Negative     HSV-2 DNA Negative      This test was developed and its performance characteristics determined  by [facility]. It has not been cleared or approved by the  U.S. Food and Drug Administration. The FDA has determined that such  clearance or approval is not necessary. This test is used for clinical  purposes. It should not be regarded as investigational or research.       Narrative:       Performed at:  [address]  [id number]  : Vikram Gann MD, Phone:  [phone]    CK [[id number]]  (Normal) Collected:  01/25/18 0551    Specimen:  Blood Updated:  01/25/18 0636     Creatine Kinase 152 U/L     Comprehensive Metabolic Panel [[id number]]  (Abnormal) Collected:  01/25/18 0551    Specimen:  Blood Updated:  01/25/18 0636     Glucose 103 (H) mg/dL      BUN 30 (H) mg/dL      Creatinine 1.09 (H) mg/dL      Sodium 145 mmol/L      Potassium 3.1 (L) mmol/L      Chloride 115 (H) mmol/L      CO2 19.6 (L) mmol/L      Calcium 7.3 (L) mg/dL      Total Protein 6.2 g/dL      Albumin 1.70 (L) g/dL      ALT (SGPT) 14 U/L      AST (SGOT) 55 (H) U/L      Alkaline Phosphatase 277 (H) U/L      Total Bilirubin 0.4 mg/dL      eGFR   Amer 61 mL/min/1.73      Globulin 4.5 gm/dL      A/G Ratio 0.4 g/dL      BUN/Creatinine Ratio 27.5 (H)     Anion Gap 10.4 mmol/L     CBC (No Diff) [[id number]]  (Abnormal) Collected:  01/25/18 0551    Specimen:  Blood Updated:  01/25/18 0806     WBC 3.31 (L) 10*3/mm3      RBC 3.87 (L) 10*6/mm3      Hemoglobin 9.1 (L) g/dL      Hematocrit 29.3 (L) %      MCV 75.7 (L) fL      MCH 23.5 (L) pg      MCHC 31.1 (L) g/dL      RDW 23.4 (H) %      RDW-SD 63.1 (H) fl      Platelets 94 (L) 10*3/mm3     Cell Count, CSF - Cerebrospinal Fluid, Lumbar Puncture [[id number]]  (Abnormal) Collected:  01/25/18 1046    Specimen:  Cerebrospinal Fluid from Lumbar Puncture Updated:  01/25/18 1147     Color, CSF Red (A)      Supernatant Color, CSF Red (A)     Appearance, CSF Cloudy (A)     RBC, CSF 077765 (H) /mm3       Estimated count due to clots present in specimen.        Nucleated Cells,  (H) /mm3       Estimated count due to clots present in specimen.        Tube Number, CSF 2      Only 1 tube received in lab and it's marked tube 2        Method: Automated XE 5000 Method    Glucose, CSF - Cerebrospinal Fluid, Lumbar Puncture [[id number]]  (Abnormal) Collected:  01/25/18 1046    Specimen:  Cerebrospinal Fluid from Lumbar Puncture Updated:  01/25/18 1157     Glucose, CSF 79 (H) mg/dL     Cell Count With Differential, CSF Use tube: 1 [[id number]] Collected:  01/25/18 1046    Specimen:  Cerebrospinal Fluid from Lumbar Puncture Updated:  01/25/18 1202    Narrative:       The following orders were created for panel order Cell Count With Differential, CSF Use tube: 1.  Procedure                               Abnormality         Status                     ---------                               -----------         ------                     Cell Count, CSF - Cerebr...[[id number]]  Abnormal            Final result               Spinal fluid differentia...[[id number]]                      Final result                 Please view results for these tests on the individual orders.    Spinal fluid differential - Cerebrospinal Fluid, Lumbar Puncture [[id number]] Collected:  01/25/18 1046    Specimen:  Cerebrospinal Fluid from Lumbar Puncture Updated:  01/25/18 1202     Neutrophils, CSF 44 %      Lymphocytes, CSF 51 %      Monocytes, CSF 5 %      nRBC, CSF 1 /100 WBCs     Protein, CSF - Cerebrospinal Fluid, Lumbar Puncture [[id number]]  (Abnormal) Collected:  01/25/18 1046    Specimen:  Cerebrospinal Fluid from Lumbar Puncture Updated:  01/25/18 1209     Protein, Total (CSF) 463.0 (H) mg/dL     Meningitis / Encephalitis Panel, PCR - Cerebrospinal Fluid, Lumbar Puncture [[id number]]  (Normal) Collected:  01/25/18 1046    Specimen:   Cerebrospinal Fluid from Lumbar Puncture Updated:  01/25/18 1348     ESCHERICHIA COLI K1, PCR Not Detected     HAEMOPHILUS INFLUENZAE, PCR Not Detected     LISTERIA MONOCYTOGENES, PCR Not Detected     NEISSERIA MENINGITIDIS, PCR Not Detected     STREPTOCOCCUS AGALACTIAE, PCR Not Detected     STREPTOCOCCUS PNEUMONIAE, PCR Not Detected     CYTOMEGALOVIRUS (CMV), PCR Not Detected     ENTEROVIRUS, PCR Not Detected     HERPES SIMPLEX VIRUS 1 (HSV-1), PCR Not Detected     HERPES SIMPLEX VIRUS 2 (HSV-2), PCR Not Detected     HUMAN PARECHOVIRUS, PCR Not Detected     VARICELLA ZOSTER VIRUS (VZV), PCR Not Detected     CRYPTOCOCCUS NEOFORMANS / GATTII, PCR Not Detected     HUMAN HERPES VIRUS 6 PCR Not Detected    Magnesium [[id number]]  (Normal) Collected:  01/25/18 0551    Specimen:  Blood Updated:  01/25/18 1355     Magnesium 1.9 mg/dL     POC Glucose Once [[id number]]  (Normal) Collected:  01/25/18 1356    Specimen:  Blood Updated:  01/25/18 1357     Glucose 92 mg/dL     Narrative:       Meter: [id number] : [id number] Greg Barber    Blood Gas, Arterial [[id number]]  (Abnormal) Collected:  01/25/18 1405    Specimen:  Arterial Blood Updated:  01/25/18 1407     Site Arterial: right radial     Musa's Test Positive     pH, Arterial 7.431 pH units      pCO2, Arterial 28.7 (L) mm Hg      pO2, Arterial 81.9 mm Hg      HCO3, Arterial 19.1 (L) mmol/L      Base Excess, Arterial -4.3 (L) mmol/L      O2 Saturation Calculated 96.5 %      Barometric Pressure for Blood Gas 759.6 mmHg      Modality HFNC     Flow Rate 15 lpm      Set [facility] Resp Rate 32     Rate 32 Breaths/minute     Narrative:       o2 sat 97 Meter: [id number] : [id number] Jason ALEX Comprehensive Panel [[id number]]  (Abnormal) Collected:  01/22/18 1633    Specimen:  Blood Updated:  01/25/18 1522     Anti-DNA (DS) Ab Qn >300 (H) IU/mL                                          Negative      <5                                     Equivocal  5 - 9                                      Positive      >9        RNP Antibodies >8.0 (H) AI      Tamez Antibodies >8.0 (H) AI      Antiscleroderma-70 Antibodies <0.2 AI      TORI SSA (RO) Ab 0.7 AI      TORI SSB (LA) Ab <0.2 AI      Antichromatin Antibodies >8.0 (H) AI      ANALI-1 IgG <0.2 AI      Anti-Centromere B Antibodies -- AI       Repeatedly unable to obtain valid results. This may be due to inter-  ference from immune complexing or other immunoglobulin interactions.  Suggest repeat on a fresh specimen in 2 to 4 weeks.        See below: Comment      Autoantibody                       Disease Association  ------------------------------------------------------------                          Condition                  Frequency  ---------------------   ------------------------   ---------  Antinuclear Antibody,    SLE, mixed connective  Direct (ABI-D)           tissue diseases  ---------------------   ------------------------   ---------  dsDNA                    SLE                        40 - 60%  ---------------------   ------------------------   ---------  Chromatin                Drug induced SLE                90%                           SLE                        48 - 97%  ---------------------   ------------------------   ---------  SSA (Ro)                 SLE                        25 - 35%                           Sjogren's Syndrome         40 - 70%                            Lupus                 100%  ---------------------   ------------------------   ---------  SSB (La)                 SLE                             10%                           Sjogren's Syndrome              30%  ---------------------   -----------------------    ---------  Sm (anti-Smith)          SLE                        15 - 30%  ---------------------   -----------------------    ---------  RNP                      Mixed Connective Tissue                           Disease                         95%  (U1 nRNP,                SLE                         30 - 50%  anti-ribonucleoprotein)  Polymyositis and/or                           Dermatomyositis                 20%  ---------------------   ------------------------   ---------  Scl-70 (antiDNA          Scleroderma (diffuse)      20 - 35%  topoisomerase)           Crest                           13%  ---------------------   ------------------------   ---------  Debbi-1                     Polymyositis and/or                           Dermatomyositis            20 - 40%  ---------------------   ------------------------   ---------  Centromere B             Scleroderma - Crest                           variant                         80%       Narrative:       Performed at:  [address] [place], [place] [id number]  : Jair Saldivar PhD, Phone:  [phone]    POC Glucose Once [[id number]]  (Normal) Collected:  01/25/18 1549    Specimen:  Blood Updated:  01/25/18 1609     Glucose 111 mg/dL     Narrative:       Meter: [id number] : [id number] Kinza Borjas RN    C-reactive Protein [[id number]]  (Abnormal) Collected:  01/25/18 1734    Specimen:  Blood Updated:  01/25/18 1835     C-Reactive Protein 7.78 (H) mg/dL     Sedimentation Rate [[id number]]  (Abnormal) Collected:  01/25/18 1734    Specimen:  Blood Updated:  01/25/18 1843     Sed Rate 78 (H) mm/hr     Blood Gas, Arterial [[id number]]  (Abnormal) Collected:  01/25/18 1931    Specimen:  Arterial Blood Updated:  01/25/18 1935     Site Arterial: left radial     Musa's Test Positive     pH, Arterial 7.367 pH units      pCO2, Arterial 37.1 mm Hg      pO2, Arterial 502.6 (H) mm Hg      HCO3, Arterial 21.3 (L) mmol/L      Base Excess, Arterial -3.6 (L) mmol/L      O2 Saturation Calculated 100.0 (H) %      A-a Gradiant 0.7 mmHg      Barometric Pressure for Blood Gas 759.0 mmHg      Modality Adult Vent     FIO2 100 %      Ventilator Mode AC     Set Tidal Volume 450     Set Mech Resp Rate 16     Rate 16 Breaths/minute       PEEP 5    Narrative:       sat 100% Meter: [id number] : [id number] Rachana Pedroza    POC Glucose Once [[id number]]  (Normal) Collected:  01/25/18 2014    Specimen:  Blood Updated:  01/25/18 2049     Glucose 106 mg/dL     Narrative:       Meter: [id number] : [id number] Elvira Lalaley BASIA    POC Glucose Once [[id number]]  (Abnormal) Collected:  01/26/18 0353    Specimen:  Blood Updated:  01/26/18 0413     Glucose 163 (H) mg/dL     Narrative:       Meter: [id number] : [id number] I & Combine BASIA    CBC (No Diff) [[id number]]  (Abnormal) Collected:  01/26/18 0507    Specimen:  Blood Updated:  01/26/18 0555     WBC 4.11 (L) 10*3/mm3      RBC 4.10 10*6/mm3      Hemoglobin 9.7 (L) g/dL      Hematocrit 31.3 (L) %      MCV 76.3 (L) fL      MCH 23.7 (L) pg      MCHC 31.0 (L) g/dL      RDW 24.1 (H) %      RDW-SD 65.4 (H) fl      Platelets 66 (L) 10*3/mm3     Basic Metabolic Panel [[id number]]  (Abnormal) Collected:  01/26/18 0507    Specimen:  Blood Updated:  01/26/18 0614     Glucose 152 (H) mg/dL      BUN 43 (H) mg/dL      Creatinine 1.53 (H) mg/dL      Sodium 144 mmol/L      Potassium 4.1 mmol/L      Chloride 111 (H) mmol/L      CO2 17.5 (L) mmol/L      Calcium 8.4 (L) mg/dL      eGFR  African Amer 42 (L) mL/min/1.73      BUN/Creatinine Ratio 28.1 (H)     Anion Gap 15.5 mmol/L     Narrative:       GFR Normal >60  Chronic Kidney Disease <60  Kidney Failure <15    Cryptococcal Antigen [[id number]]  (Normal) Collected:  01/25/18 1734    Specimen:  Blood Updated:  01/26/18 0623     Crypto Ag Negative    POC Glucose Once [[id number]]  (Abnormal) Collected:  01/26/18 0728    Specimen:  Blood Updated:  01/26/18 0746     Glucose 168 (H) mg/dL     Narrative:       Meter: [id number] : [id number] Andre PACHECO    Flow Cytometry, Blood [[id number]] Collected:  01/25/18 0618    Specimen:  Blood Updated:  01/26/18 1049     Reference Lab Report --    POC Glucose Once [[id number]]  (Abnormal) Collected:   "01/26/18 1154    Specimen:  Blood Updated:  01/26/18 1205     Glucose 185 (H) mg/dL     Narrative:       Meter: [id number] : [id number] Andre PACHECO    Immature Platelet Fraction [[id number]]  (Abnormal) Collected:  01/26/18 1119    Specimen:  Blood Updated:  01/26/18 1209     IPF 6.20 %      Platelets 62 (L) 10*3/mm3     ANCA Panel [[id number]]  (Abnormal) Collected:  01/22/18 1633    Specimen:  Blood Updated:  01/26/18 1518     Myeloperoxidase Ab <9.0 U/mL      Antiproteinase 3 (IN-3) <3.5 U/mL      C-ANCA 1:160 (H) titer      P-ANCA <1:20 titer       The presence of positive fluorescence exhibiting P-ANCA or C-ANCA  patterns alone is not specific for the diagnosis of Wegener's  Granulomatosis (WG) or microscopic polyangiitis. Decisions about  treatment should not be based solely on ANCA IFA results.  The  International ANCA Group Consensus recommends follow up testing of  positive sera with both IN-3 and MPO-ANCA enzyme immunoassays. As  many as 5% serum samples are positive only by EIA.  Ref. AM J Clin Pathol 1999;111:507-513.        Atypical pANCA <1:20 titer       The atypical pANCA pattern has been observed in a significant  percentage of patients with ulcerative colitis, primary sclerosing  cholangitis and autoimmune hepatitis.       Narrative:       Performed at:  01 - [address]  [id number]  : Vikram Gann MD, Phone:  [phone]  Performed at:  02 - [facility]  [id number]  : Jair Saldivar PhD, Phone:  [phone]        /85  Pulse 89  Temp 97.5 °F (36.4 °C) (Oral)   Resp 18  Ht 149.9 cm (59.02\")  Wt 69.7 kg (153 lb 11.2 oz)  SpO2 100%  BMI 31.03 kg/m2    Discharge Exam:  General Appearance:    Sedated on vent, no distress                          Head:    Normocephalic, without obvious abnormality, atraumatic                          Eyes:                            Throat:   Lips, " tongue, gums normal                          Neck:   Supple, symmetrical, trachea midline, no JVD                        Lungs:     Clear to auscultation bilaterally, respirations unlabored                Chest Wall:    No tenderness or deformity                        Heart:    Regular rate and rhythm, S1 and S2 normal, no murmur,no  Rub  or gallop                  Abdomen:     Soft, non-tender, bowel sounds active, no masses, no organomegaly                  Extremities:   Extremities normal, atraumatic, no cyanosis or edema , cellulitis on ankles resolving                            Skin:   Skin is warm and dry,  no rashes or palpable lesions                  Neurologic:   sedated     Disposition:  [facility] to transfer via [facility] ambulance to ICU    Patient Instructions:    Domitila Valera   Home Medication Instructions MICHELLE:[id number]    Printed on:01/27/18 2566   Medication Information                      amLODIPine (NORVASC) 5 MG tablet  Take 1 tablet by mouth Daily.             cycloSPORINE (RESTASIS) 0.05 % ophthalmic emulsion  Administer 1 drop to both eyes 2 (Two) Times a Day.             gabapentin (NEURONTIN) 250 MG/5ML solution  Take 4 mL by mouth Every 12 (Twelve) Hours.             hydrALAZINE (APRESOLINE) 20 MG/ML injection  Infuse 0.5 mL into a venous catheter Every 6 (Six) Hours As Needed for High Blood Pressure.             lansoprazole (FIRST) 3 MG/ML suspension oral suspension  10 mL by Nasogastric route Every 12 (Twelve) Hours.             methylPREDNISolone sod succ 1 g/100 mL 0.9% NS VTB (mbp)  Infuse 100 mL into a venous catheter Daily.             metoprolol tartrate (LOPRESSOR) injection  Infuse 5 mL into a venous catheter Every 6 (Six) Hours As Needed (HR  > 100).             piperacillin-tazobactam (ZOSYN) 3-0.375 GM/50ML IVPB  Infuse 50 mL into a venous catheter Every 12 (Twelve) Hours for 12 doses. Indications: Pneumonia             propofol (DIPRIVAN) 1000 mg/mL emulsion  infusion  Infuse 348.5-3,485 mcg/min into a venous catheter Dose Adjusted By Provider As Needed.             sodium chloride 0.9 % solution  Infuse 100 mL/hr into a venous catheter Continuous.               No future appointments.  Follow-up Information     Follow up with Javier Soriano III, MD .    Specialty:  Internal Medicine    Contact information:    [address] [address]  [phone]          Discharge Order     None          Total time spent discharging patient including evaluation,post hospitalization follow up,  medication and post hospitalization instructions and education total time exceeds 30 minutes.    Signed:  Elie Morales MD  1/27/2018  3:49 PM

## 2018-01-27 NOTE — PROGRESS NOTES
"   LOS: 8 days   Patient Care Team:  Javier Soriano III, MD as PCP - General (Internal Medicine)    Chief Complaint: fay    Subjective     Fever      Foot Pain   Associated symptoms include a fever.       Subjective   follow up of acute on ckd III  History taken from: patient chart    Objective     Vital Signs  Temp:  [97.4 °F (36.3 °C)-98.8 °F (37.1 °C)] 98.8 °F (37.1 °C)  Heart Rate:  [67-84] 71  Resp:  [16-28] 18  BP: (101-148)/() 129/83  FiO2 (%):  [40 %] 40 %    Objective:  Vital signs: (most recent): Blood pressure 129/83, pulse 71, temperature 98.8 °F (37.1 °C), temperature source Oral, resp. rate 18, height 149.9 cm (59.02\"), weight 69.7 kg (153 lb 11.2 oz), SpO2 100 %.  Fever.          General Appearance:  In no acute distress.    HEENT: Normal HEENT exam.    Lungs:  Normal respiratory rate and normal effort.  He is not in respiratory distress.  Breath sounds clear to auscultation.  No wheezes, rales or rhonchi.    Heart: Normal rate.  Regular rhythm.  S1 normal and S2 normal.  No murmur or gallop.   Chest: Asymmetric chest wall expansion.   Extremities: There is no deformity, effusion or dependent edema.    Neurological: Patient is sedate on vent  Skin:  Warm and dry.  No rash.   Abdomen: Abdomen is soft and non-distended.  There are no signs of ascites.  There is no abdominal tenderness.    Results Review:     I reviewed the patient's new clinical results.    Medication Review:   Scheduled Meds:  amLODIPine 5 mg Oral Q24H   castor oil-balsam peru  Topical Q12H   cycloSPORINE 1 drop Both Eyes BID   gabapentin 200 mg Oral Q12H   lansoprazole 30 mg Nasogastric Q12H   methylPREDNISolone sodium succinate 1,000 mg Intravenous Q24H   piperacillin-tazobactam 3.375 g Intravenous Q8H   potassium chloride 40 mEq Oral Once     Continuous Infusions:  diltiaZEM 5-15 mg/hr Last Rate: Stopped (01/26/18 1100)   hold 1 each    propofol 5-50 mcg/kg/min Last Rate: 45 mcg/kg/min (01/27/18 0638)   sodium chloride 100 " mL/hr Last Rate: 100 mL/hr (01/26/18 9664)     PRN Meds:.•  acetaminophen  •  acetaminophen **OR** acetaminophen  •  acetaminophen  •  dextrose  •  fentaNYL citrate (PF)  •  hold  •  hydrALAZINE  •  HYDROcodone-acetaminophen  •  metoprolol tartrate  •  ondansetron  •  potassium chloride **OR** potassium chloride **OR** potassium chloride  •  sodium chloride  •  sodium chloride  •  temazepam    Assessment/Plan     Principal Problem:    FUO (fever of unknown origin)  Active Problems:    Hyperkalemia    Hyponatremia    JEFFERY (acute kidney injury)    Anemia    GI bleed    Cellulitis    Thrombocytopenia    Paroxysmal atrial fibrillation    PNA (pneumonia)    ANCA-positive vasculitis    Encephalopathy    Positive ABI (antinuclear antibody)    Rheumatoid arthritis      Assessment & Plan  1. jeffery  2. ckd III bl cr 1.4  3. afib  4. Cns vasculitis  5. Cellulitis  6. hypokalemia        patient seen and examined. Sedate on vent. No edema. Labs reviewed. Renal function worsening with cr of 2.6.  Unsure of uop. Place wu today. Discussed renal function at length with family. Patient being transferred to [facility] today. Will need nephrology to follow therer. Likely justin Zimmerman MD  01/27/18  7:10 AM

## 2018-01-27 NOTE — PROGRESS NOTES
"DAILY PROGRESS NOTE  [facility]    Patient Identification:  Name: Domitila Valera  Age: 62 y.o.  Sex: female  :  1955  MRN: [id number]         Primary Care Physician: Javier Soriano III, MD    Subjective:  Interval History:She sedated on vent.     Objective:    Scheduled Meds:    amLODIPine 5 mg Oral Q24H   castor oil-balsam peru  Topical Q12H   cycloSPORINE 1 drop Both Eyes BID   gabapentin 200 mg Oral Q12H   insulin aspart 0-24 Units Subcutaneous Q4H   lansoprazole 30 mg Nasogastric Q12H   methylPREDNISolone sodium succinate 1,000 mg Intravenous Q24H   [START ON 2018] piperacillin-tazobactam 3.375 g Intravenous Q12H   potassium chloride 40 mEq Oral Once     Continuous Infusions:    diltiaZEM 5-15 mg/hr Last Rate: Stopped (18 1100)   hold 1 each    propofol 5-50 mcg/kg/min Last Rate: 30 mcg/kg/min (18 1217)   sodium chloride 100 mL/hr Last Rate: 100 mL/hr (18 2226)       Vital signs in last 24 hours:  Temp:  [97.5 °F (36.4 °C)-98.8 °F (37.1 °C)] 97.5 °F (36.4 °C)  Heart Rate:  [70-89] 89  Resp:  [16-19] 18  BP: (107-148)/(70-96) 144/85  FiO2 (%):  [40 %] 40 %    Intake/Output:    Intake/Output Summary (Last 24 hours) at 18 1543  Last data filed at 18 1200   Gross per 24 hour   Intake           4051.1 ml   Output              200 ml   Net           3851.1 ml       Exam:  /85  Pulse 89  Temp 97.5 °F (36.4 °C) (Oral)   Resp 18  Ht 149.9 cm (59.02\")  Wt 69.7 kg (153 lb 11.2 oz)  SpO2 100%  BMI 31.03 kg/m2    General Appearance:    sedated, no distress   Head:    Normocephalic, without obvious abnormality, atraumatic   Eyes:       Throat:   Lips, tongue, gums normal   Neck:   Supple, symmetrical, trachea midline, no JVD   Lungs:     Clear to auscultation bilaterally, respirations unlabored   Chest Wall:    No tenderness or deformity    Heart:    irregular rate and rhythm, S1 and S2 normal, no murmur,no  Rub or gallop   Abdomen:     Soft, non-tender, " bowel sounds active, no masses, no organomegaly    Extremities:   Extremities normal, atraumatic, no cyanosis or edema, cellulitis in lower extremity improved   Pulses:      Skin:   Skin is warm and dry,  no rashes or palpable lesions   Neurologic:   sedated      [unfilled]  Data Review:    Results from last 7 days  Lab Units 01/27/18  0511 01/26/18  0507 01/25/18  0551   SODIUM mmol/L 146* 144 145   POTASSIUM mmol/L 3.7 4.1 3.1*   CHLORIDE mmol/L 113* 111* 115*   CO2 mmol/L 15.5* 17.5* 19.6*   BUN mg/dL 58* 43* 30*   CREATININE mg/dL 2.64* 1.53* 1.09*   GLUCOSE mg/dL 226* 152* 103*   CALCIUM mg/dL 7.8* 8.4* 7.3*       Results from last 7 days  Lab Units 01/27/18  0059 01/26/18  1119 01/26/18  0507 01/25/18  0551   WBC 10*3/mm3 4.65  --  4.11* 3.31*   HEMOGLOBIN g/dL 8.7*  --  9.7* 9.1*   HEMATOCRIT % 28.5*  --  31.3* 29.3*   PLATELETS 10*3/mm3 83* 62* 66* 94*       Results from last 7 days  Lab Units 01/24/18  0249   TSH mIU/mL 0.295       Results from last 7 days  Lab Units 01/24/18  0249   HEMOGLOBIN A1C % 5.60     No results found for: TROPONINT    Results from last 7 days  Lab Units 01/24/18  0249   CHOLESTEROL mg/dL 102   TRIGLYCERIDES mg/dL 347*   HDL CHOL mg/dL 9*       Results from last 7 days  Lab Units 01/25/18  0551 01/23/18  0542 01/22/18  0455   ALK PHOS U/L 277* 268* 346*   BILIRUBIN mg/dL 0.4 0.4 0.4   ALT (SGPT) U/L 14 14 19   AST (SGOT) U/L 55* 40* 56*       Results from last 7 days  Lab Units 01/24/18  0249   TSH mIU/mL 0.295       Results from last 7 days  Lab Units 01/24/18  0249   HEMOGLOBIN A1C % 5.60     Glucose   Date/Time Value Ref Range Status   01/27/2018 1126 274 (H) 70 - 130 mg/dL Final   01/27/2018 0622 247 (H) 70 - 130 mg/dL Final   01/26/2018 2315 213 (H) 70 - 130 mg/dL Final   01/26/2018 1945 202 (H) 70 - 130 mg/dL Final   01/26/2018 1629 175 (H) 70 - 130 mg/dL Final   01/26/2018 1154 185 (H) 70 - 130 mg/dL Final   01/26/2018 0728 168 (H) 70 - 130 mg/dL Final   01/26/2018 0353 163  (H) 70 - 130 mg/dL Final           Patient Active Problem List   Diagnosis Code   • FUO (fever of unknown origin) R50.9   • Hyperkalemia E87.5   • Benign essential HTN I10   • Cellulitis of right upper arm L03.113   • Hyponatremia E87.1   • JEFFERY (acute kidney injury) N17.9   • Anemia D64.9   • GI bleed K92.2   • Cellulitis L03.90   • Thrombocytopenia D69.6   • Paroxysmal atrial fibrillation I48.0   • PNA (pneumonia) J18.9   • 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   • Encephalopathy G93.40   • Positive ABI (antinuclear antibody) R76.8   • Rheumatoid arthritis M06.9       Assessment:  Active Hospital Problems (** Indicates Principal Problem)    Diagnosis Date Noted   • **FUO (fever of unknown origin) [R50.9] 01/19/2018   • PNA (pneumonia) [J18.9] 01/26/2018   • ANCA-positive vasculitis [I77.6] 01/26/2018   • Encephalopathy [G93.40] 01/26/2018   • Positive ABI (antinuclear antibody) [R76.8] 01/26/2018   • Rheumatoid arthritis [M06.9] 01/26/2018   • Paroxysmal atrial fibrillation [I48.0] 01/25/2018   • Thrombocytopenia [D69.6] 01/24/2018   • Cellulitis [L03.90] 01/22/2018   • GI bleed [K92.2] 01/21/2018   • Anemia [D64.9] 01/20/2018   • Hyperkalemia [E87.5] 01/19/2018   • Hyponatremia [E87.1] 01/19/2018   • JEFFERY (acute kidney injury) [N17.9] 01/19/2018      Resolved Hospital Problems    Diagnosis Date Noted Date Resolved   No resolved problems to display.       Plan:  ID consult noted.Neuro, pulmonary, nephrology, hematology and cardiology consults noted  Work up in progress  plans and antibiotics per  ID.    Protonix IV    .Remains sinus. Continue high dose steroids. To transfer to [facility] via air ambulance for further evaluation. Discussed with family.   DC summary done.   See DC note  Elie Morales MD  1/27/2018  3:43 PM

## 2018-01-27 NOTE — PROGRESS NOTES
Reviewed chart and cbc. Platelets improved. Patient being transferred to [facility] today. Please call if transfer does not occur.

## 2018-01-28 NOTE — PROGRESS NOTES
Case Management Discharge Note    Final Note: Pt dc'd to [facility] 1/27/18    Discharge Placement     Facility/Agency Request Status Selected? Address Phone Number Fax Number    [facility] Pending - Request Sent     [address] , [place] [address] [phone]        TEREAS Santos 1/22/2018 12:16    Patient current with [facility]               [facility] [place] Pending - Request Sent     [address] [address] [phone]         Other: Other (transportation arrangements per [facility] transport team)    Discharge Codes: 66  Discharged/transferred to a critical access hospital ([facility]) ([facility])

## 2018-01-28 NOTE — NURSING NOTE
Following peripherally since referral of 1/23/18.  Noted plan for [facility].  Thank you--Priti Schmitt,  Rehab Adm Nurse

## 2018-01-29 LAB
BACTERIA SPEC RESP CULT: ABNORMAL
GRAM STN SPEC: ABNORMAL
GRAM STN SPEC: ABNORMAL

## 2018-03-29 ENCOUNTER — LAB REQUISITION (OUTPATIENT)
Dept: LAB | Facility: HOSPITAL | Age: 63
End: 2018-03-29

## 2018-03-29 DIAGNOSIS — Z00.00 ROUTINE GENERAL MEDICAL EXAMINATION AT A HEALTH CARE FACILITY: ICD-10-CM

## 2018-03-29 LAB
DEPRECATED RDW RBC AUTO: 51.3 FL (ref 37–54)
ERYTHROCYTE [DISTWIDTH] IN BLOOD BY AUTOMATED COUNT: 15.9 % (ref 11.7–13)
HCT VFR BLD AUTO: 20.7 % (ref 35.6–45.5)
HGB BLD-MCNC: 6.5 G/DL (ref 11.9–15.5)
MCH RBC QN AUTO: 28.5 PG (ref 26.9–32)
MCHC RBC AUTO-ENTMCNC: 31.4 G/DL (ref 32.4–36.3)
MCV RBC AUTO: 90.8 FL (ref 80.5–98.2)
PLATELET # BLD AUTO: 180 10*3/MM3 (ref 140–500)
PMV BLD AUTO: 9.9 FL (ref 6–12)
RBC # BLD AUTO: 2.28 10*6/MM3 (ref 3.9–5.2)
WBC NRBC COR # BLD: 4.69 10*3/MM3 (ref 4.5–10.7)

## 2018-03-29 PROCEDURE — 85027 COMPLETE CBC AUTOMATED: CPT

## 2018-04-02 ENCOUNTER — LAB REQUISITION (OUTPATIENT)
Dept: LAB | Facility: HOSPITAL | Age: 63
End: 2018-04-02

## 2018-04-02 DIAGNOSIS — D64.9 ANEMIA: ICD-10-CM

## 2018-04-02 LAB
HCT VFR BLD AUTO: 26.9 % (ref 35.6–45.5)
HGB BLD-MCNC: 8.3 G/DL (ref 11.9–15.5)

## 2018-04-02 PROCEDURE — 85018 HEMOGLOBIN: CPT

## 2018-04-02 PROCEDURE — 85014 HEMATOCRIT: CPT

## 2018-04-08 ENCOUNTER — LAB REQUISITION (OUTPATIENT)
Dept: LAB | Facility: HOSPITAL | Age: 63
End: 2018-04-08

## 2018-04-08 DIAGNOSIS — Z00.00 ROUTINE GENERAL MEDICAL EXAMINATION AT A HEALTH CARE FACILITY: ICD-10-CM

## 2018-04-08 LAB
HCT VFR BLD AUTO: 28.6 % (ref 35.6–45.5)
HGB BLD-MCNC: 8.9 G/DL (ref 11.9–15.5)

## 2018-04-08 PROCEDURE — 85014 HEMATOCRIT: CPT

## 2018-04-08 PROCEDURE — 85018 HEMOGLOBIN: CPT

## 2018-05-30 ENCOUNTER — APPOINTMENT (OUTPATIENT)
Dept: MRI IMAGING | Facility: HOSPITAL | Age: 63
End: 2018-05-30

## 2018-05-30 ENCOUNTER — TRANSCRIBE ORDERS (OUTPATIENT)
Dept: ADMINISTRATIVE | Facility: HOSPITAL | Age: 63
End: 2018-05-30

## 2018-05-30 DIAGNOSIS — M32.19 LUPUS ENCEPHALITIS (HCC): ICD-10-CM

## 2018-05-30 DIAGNOSIS — I82.629 ACUTE VENOUS EMBOLISM AND THROMBOSIS OF DEEP VEINS OF UPPER EXTREMITY, UNSPECIFIED LATERALITY (HCC): ICD-10-CM

## 2018-05-30 DIAGNOSIS — G93.40 ENCEPHALOPATHY ACUTE: Primary | ICD-10-CM

## 2018-05-30 DIAGNOSIS — G93.40 ENCEPHALOPATHY: Primary | ICD-10-CM

## 2018-06-12 ENCOUNTER — HOSPITAL ENCOUNTER (OUTPATIENT)
Dept: MRI IMAGING | Facility: HOSPITAL | Age: 63
Discharge: HOME | End: 2018-06-12
Admitting: PSYCHIATRY & NEUROLOGY

## 2018-06-12 DIAGNOSIS — I82.629 ACUTE VENOUS EMBOLISM AND THROMBOSIS OF DEEP VEINS OF UPPER EXTREMITY, UNSPECIFIED LATERALITY (HCC): ICD-10-CM

## 2018-06-12 DIAGNOSIS — G93.40 ENCEPHALOPATHY ACUTE: ICD-10-CM

## 2018-06-12 DIAGNOSIS — M32.19 LUPUS ENCEPHALITIS (HCC): ICD-10-CM

## 2018-06-12 PROCEDURE — 70551 MRI BRAIN STEM W/O DYE: CPT

## 2018-12-12 ENCOUNTER — TRANSCRIBE ORDERS (OUTPATIENT)
Dept: ADMINISTRATIVE | Facility: HOSPITAL | Age: 63
End: 2018-12-12

## 2018-12-12 DIAGNOSIS — G35 MS (MULTIPLE SCLEROSIS) (HCC): ICD-10-CM

## 2018-12-12 DIAGNOSIS — G93.40 ENCEPHALOPATHY, UNSPECIFIED: Primary | ICD-10-CM

## 2018-12-26 ENCOUNTER — APPOINTMENT (OUTPATIENT)
Dept: MRI IMAGING | Facility: HOSPITAL | Age: 63
End: 2018-12-26

## 2019-01-17 ENCOUNTER — HOSPITAL ENCOUNTER (OUTPATIENT)
Dept: MRI IMAGING | Facility: HOSPITAL | Age: 64
Discharge: HOME | End: 2019-01-17
Admitting: PSYCHIATRY & NEUROLOGY

## 2019-01-17 DIAGNOSIS — G35 MS (MULTIPLE SCLEROSIS) (HCC): ICD-10-CM

## 2019-01-17 DIAGNOSIS — G93.40 ENCEPHALOPATHY, UNSPECIFIED: ICD-10-CM

## 2019-01-17 PROCEDURE — 70551 MRI BRAIN STEM W/O DYE: CPT

## 2019-12-10 NOTE — DISCHARGE PLACEMENT REQUEST
"Catracho Abbott (62 y.o. Female)     Date of Birth Social Security Number Address Home Phone MRN    1955  [address] [id number] [phone] [id number]    Anglican Marital Status          Bahai        Admission Date Admission Type Admitting Provider Attending Provider Department, Room/Bed    1/19/18 Emergency Hugo Elam MD Beard, Lyle E, MD [address], 508/1    Discharge Date Discharge Disposition Discharge Destination                      Attending Provider: Elie Morales MD     Allergies:  Buffered Aspirin, Ace Inhibitors, Aurothioglucose, Latex, Keflex [Cephalexin]    Isolation:  None   Infection:  None   Code Status:  FULL    Ht:  149.9 cm (59\")   Wt:  65 kg (143 lb 4.8 oz)    Admission Cmt:  None   Principal Problem:  FUO (fever of unknown origin) [R50.9]                 Active Insurance as of 1/19/2018     Primary Coverage     Payor Plan Insurance Group Employer/Plan Group    MEDICARE MEDICARE A & B      Payor Plan Address Payor Plan Phone Number Effective From Effective To    [address] [phone] 10/1/1998     [place] [address]       Subscriber Name Subscriber Birth Date Member ID       CATRACHO ABBOTT 1955 [id number]                 Emergency Contacts      (Rel.) Home Phone Work Phone Mobile Phone    Prashant Abbott (Son) [phone] -- [phone]              " no

## 2022-07-14 ENCOUNTER — OFFICE VISIT (OUTPATIENT)
Dept: GASTROENTEROLOGY | Facility: CLINIC | Age: 67
End: 2022-07-14

## 2022-07-14 ENCOUNTER — PREP FOR SURGERY (OUTPATIENT)
Dept: SURGERY | Facility: SURGERY CENTER | Age: 67
End: 2022-07-14

## 2022-07-14 VITALS
SYSTOLIC BLOOD PRESSURE: 196 MMHG | WEIGHT: 137.2 LBS | HEIGHT: 59 IN | BODY MASS INDEX: 27.66 KG/M2 | OXYGEN SATURATION: 100 % | HEART RATE: 89 BPM | TEMPERATURE: 97.8 F | DIASTOLIC BLOOD PRESSURE: 116 MMHG

## 2022-07-14 DIAGNOSIS — D59.39 ATYPICAL HEMOLYTIC UREMIC SYNDROME: ICD-10-CM

## 2022-07-14 DIAGNOSIS — Z79.01 CURRENT USE OF LONG TERM ANTICOAGULATION: ICD-10-CM

## 2022-07-14 DIAGNOSIS — K28.9 JEJUNAL ULCER: ICD-10-CM

## 2022-07-14 DIAGNOSIS — D69.6 THROMBOCYTOPENIA: ICD-10-CM

## 2022-07-14 DIAGNOSIS — R19.5 CHANGE IN STOOL CALIBER: ICD-10-CM

## 2022-07-14 DIAGNOSIS — I77.82 ANCA-POSITIVE VASCULITIS: ICD-10-CM

## 2022-07-14 DIAGNOSIS — K92.2 GI BLEED: ICD-10-CM

## 2022-07-14 DIAGNOSIS — Z12.11 ENCOUNTER FOR SCREENING FOR MALIGNANT NEOPLASM OF COLON: ICD-10-CM

## 2022-07-14 DIAGNOSIS — D69.6 THROMBOCYTOPENIA: Primary | ICD-10-CM

## 2022-07-14 DIAGNOSIS — D50.8 OTHER IRON DEFICIENCY ANEMIA: Primary | ICD-10-CM

## 2022-07-14 PROCEDURE — 99204 OFFICE O/P NEW MOD 45 MIN: CPT | Performed by: INTERNAL MEDICINE

## 2022-07-14 RX ORDER — PREDNISONE 1 MG/1
5 TABLET ORAL DAILY
COMMUNITY
Start: 2022-07-05

## 2022-07-14 RX ORDER — APIXABAN 2.5 MG/1
2.5 TABLET, FILM COATED ORAL 2 TIMES DAILY
COMMUNITY
Start: 2022-06-25

## 2022-07-14 RX ORDER — LOSARTAN POTASSIUM 25 MG/1
25 TABLET ORAL DAILY
COMMUNITY
Start: 2022-01-26

## 2022-07-14 RX ORDER — CLONIDINE HYDROCHLORIDE 0.1 MG/1
TABLET ORAL
COMMUNITY
Start: 2022-05-07

## 2022-07-14 RX ORDER — LEVETIRACETAM 500 MG/1
500 TABLET ORAL 2 TIMES DAILY
COMMUNITY
Start: 2022-05-23

## 2022-07-14 RX ORDER — CLONIDINE HYDROCHLORIDE 0.2 MG/1
TABLET ORAL
COMMUNITY
Start: 2022-06-21

## 2022-07-14 RX ORDER — SODIUM CHLORIDE 0.9 % (FLUSH) 0.9 %
10 SYRINGE (ML) INJECTION AS NEEDED
Status: CANCELLED | OUTPATIENT
Start: 2022-07-14

## 2022-07-14 RX ORDER — SODIUM CHLORIDE 0.9 % (FLUSH) 0.9 %
3 SYRINGE (ML) INJECTION EVERY 12 HOURS SCHEDULED
Status: CANCELLED | OUTPATIENT
Start: 2022-07-14

## 2022-07-14 RX ORDER — HYDROXYCHLOROQUINE SULFATE 200 MG/1
200 TABLET, FILM COATED ORAL DAILY
COMMUNITY
Start: 2022-07-05

## 2022-07-14 RX ORDER — PANTOPRAZOLE SODIUM 20 MG/1
20 TABLET, DELAYED RELEASE ORAL DAILY
COMMUNITY
Start: 2022-04-04

## 2022-07-14 RX ORDER — HYDROCODONE BITARTRATE AND ACETAMINOPHEN 7.5; 325 MG/1; MG/1
TABLET ORAL
COMMUNITY
Start: 2022-06-02

## 2022-07-14 RX ORDER — MYCOPHENOLATE MOFETIL 250 MG/1
CAPSULE ORAL
COMMUNITY
Start: 2022-06-28

## 2022-07-14 RX ORDER — TEMAZEPAM 15 MG/1
CAPSULE ORAL
COMMUNITY
Start: 2022-05-25

## 2022-07-14 NOTE — PROGRESS NOTES
"Chief Complaint   Patient presents with   • Follow-up     Colon cancer screening           History of Present Illness    Patient is a 66 year old female who presents to the office as a new patient. She has a history of elevated liver enzymes, ischemic colitis, jejunal ulcer,GERD, RA, lupus nephritis, cervical spondylosis Thrombocytopenia, DVT, Lupus, Seizure, and hemolytic anemia.    Patient underwent colonoscopy 3/13/2018 at the [facility] with red blood found throughout the entire colon a lavage of the area was performed using copious amounts of water incomplete clearance distal ileum contained red blood feelings at that time of the small bowel source--capsule study was then performed and identified large amounts of bright red blood in the jejunum which was treated with a clip placement via push enteroscopy on 3/15/2015 with subsequent stabilization of hgb.     Physicians at [facility] determined that the onset of GI bleeding was possibly associated with her Xeljanz regimen for RA.    Was to have another colonoscopy 2021 and cancelled it due to not feeling well    For reflux, she is taking pantoprazole 40 mg po once a daily and describes symptoms as well-controlled without nausea, vomiting, of dysphagia.     Describes bowel habits as increased frequency that is loose in nature with decreased stool caliber and mucous without melena or hematochezia.     She is currently on Eliquis anticoagulation therapy who is managed by Dr. Rodriguez.       Review of Systems     Result Review :       Hemoglobin & Hematocrit, Blood (04/08/2018 16:00)      Vital Signs:   BP (!) 200/120   Pulse 89   Temp 97.8 °F (36.6 °C)   Ht 149.9 cm (59\")   Wt 62.2 kg (137 lb 3.2 oz)   SpO2 100%   BMI 27.71 kg/m²     Body mass index is 27.71 kg/m².     Physical Exam  Vitals reviewed.   Constitutional:       Appearance: Normal appearance.   HENT:      Nose: No nasal deformity.   Eyes:      General: No scleral icterus.  Neck:      " Comments: Trachea midline.  Cardiovascular:      Rate and Rhythm: Normal rate and regular rhythm.   Pulmonary:      Effort: No respiratory distress.      Breath sounds: Normal breath sounds.   Abdominal:      General: Bowel sounds are normal. There is no distension.      Palpations: Abdomen is soft. There is no mass.      Tenderness: There is no abdominal tenderness.   Lymphadenopathy:      Comments: No periumbilical lymphadenopathy.   Skin:     General: Skin is warm.   Neurological:      Mental Status: She is alert.           Assessment and Plan    Diagnoses and all orders for this visit:    1. Other iron deficiency anemia (Primary)    2. Atypical hemolytic uremic syndrome (HCC)    3. ANCA-positive vasculitis (HCC)    4. Thrombocytopenia (HCC)    5. Change in stool caliber    6. Current use of long term anticoagulation         Discussion:    Discussed at length risk and benefit comparison of virtual colonoscopy verses colonoscopy evaluation for colon cancer screening.  She verbalizes concern over potential complications secondary to her atypical hemolytic uremic syndrome.    Discussed that our office recommends performing colonoscopy evaluation versus virtual option due to risk of not detecting small colon polyps or malignancy.  However, patient and Patient's sister ultimately chose to proceed with virtual colonoscopy  Due to patient's complex past medical history and need for anticoagulation therapy we will proceed with virtual colonoscopy and if positive will complete colonoscopy.     To obtain cardiac clearance for holding eliquis therapy for scheduled colonoscopy evaluation from Dr. Nicholson.     In regards to patient's hypertension, we encouraged patient to recheck upon arriving home due to dosing of afternoon medications.  Patient verbalizes concerning signs and symptoms of CVA that would warrant immediate evaluation in the ER.  She is agreeable.  All questions answered and support provided     I have reviewed  and confirmed the accuracy of the HPI and Assessment and Plan as documented by the APRN RAMANDEEP Gee        Follow Up   Return for at procedure.    Patient Instructions        Schedule Virtual colonoscopy evaluation, orders placed.    Follow-up visit after colonoscopy to review findings and make additional recommendations if needed.    Will obtain cardiac clearance from Dr. Rodriguez prior to colonoscopy evaluation if virtual colonoscopy is abnormal

## 2022-07-14 NOTE — PATIENT INSTRUCTIONS
Schedule Virtual colonoscopy evaluation, orders placed.    Follow-up visit after colonoscopy to review findings and make additional recommendations if needed.    Will obtain cardiac clearance from Dr. Rodriguez prior to colonoscopy evaluation if virtual colonoscopy is abnormal

## 2023-02-01 ENCOUNTER — TELEPHONE (OUTPATIENT)
Dept: GASTROENTEROLOGY | Facility: CLINIC | Age: 68
End: 2023-02-01
Payer: MEDICARE

## 2023-02-01 NOTE — TELEPHONE ENCOUNTER
Patient's PCP office called (Dr. Nicholson) stating that patient needs to be scheduled for her CLS. Patient's last scope was 07/2022 with Dr. Perez. Thanks you. EL

## 2023-02-03 ENCOUNTER — TELEPHONE (OUTPATIENT)
Dept: GASTROENTEROLOGY | Facility: CLINIC | Age: 68
End: 2023-02-03
Payer: MEDICARE

## 2023-02-15 DIAGNOSIS — R19.5 CHANGE IN STOOL CALIBER: ICD-10-CM

## 2023-02-15 DIAGNOSIS — I77.82 ANCA-POSITIVE VASCULITIS: ICD-10-CM

## 2023-02-15 DIAGNOSIS — K55.9 ISCHEMIC COLITIS: ICD-10-CM

## 2023-02-15 DIAGNOSIS — D69.6 THROMBOCYTOPENIA: ICD-10-CM

## 2023-02-15 DIAGNOSIS — D59.39 ATYPICAL HEMOLYTIC UREMIC SYNDROME: Primary | ICD-10-CM

## 2023-02-15 DIAGNOSIS — D50.8 OTHER IRON DEFICIENCY ANEMIA: ICD-10-CM

## 2023-02-15 DIAGNOSIS — K55.1 CHRONIC VASCULAR DISORDERS OF INTESTINE: ICD-10-CM

## 2023-02-15 DIAGNOSIS — Z79.01 CURRENT USE OF LONG TERM ANTICOAGULATION: ICD-10-CM

## 2024-08-05 NOTE — CODE DOCUMENTATION
Plan to admit to ccu, awaiting bed to be ready. Jeanna RN will stay with pt and accompany pt to ccu. Family updated on plan of care   8

## 2024-09-17 ENCOUNTER — TRANSCRIBE ORDERS (OUTPATIENT)
Dept: ADMINISTRATIVE | Facility: HOSPITAL | Age: 69
End: 2024-09-17
Payer: MEDICARE

## 2024-09-17 DIAGNOSIS — N28.89 OTHER SPECIFIED DISORDERS OF KIDNEY AND URETER: Primary | ICD-10-CM
